# Patient Record
Sex: FEMALE | Race: WHITE | NOT HISPANIC OR LATINO | Employment: UNEMPLOYED | URBAN - NONMETROPOLITAN AREA
[De-identification: names, ages, dates, MRNs, and addresses within clinical notes are randomized per-mention and may not be internally consistent; named-entity substitution may affect disease eponyms.]

---

## 2023-03-22 DIAGNOSIS — I48.0 PAROXYSMAL ATRIAL FIBRILLATION (MULTI): Primary | ICD-10-CM

## 2023-03-22 RX ORDER — SOTALOL HYDROCHLORIDE 80 MG/1
80 TABLET ORAL EVERY 12 HOURS
COMMUNITY
End: 2023-03-22 | Stop reason: SDUPTHER

## 2023-03-26 PROBLEM — I12.9 STAGE 3 CHRONIC KIDNEY DISEASE DUE TO BENIGN HYPERTENSION (MULTI): Status: ACTIVE | Noted: 2023-03-26

## 2023-03-26 PROBLEM — I49.5 SICK SINUS SYNDROME DUE TO SA NODE DYSFUNCTION (MULTI): Status: ACTIVE | Noted: 2023-03-26

## 2023-03-26 PROBLEM — I25.10 CAD (CORONARY ATHEROSCLEROTIC DISEASE): Status: ACTIVE | Noted: 2023-03-26

## 2023-03-26 PROBLEM — N18.30 STAGE 3 CHRONIC KIDNEY DISEASE DUE TO BENIGN HYPERTENSION (MULTI): Status: ACTIVE | Noted: 2023-03-26

## 2023-03-26 PROBLEM — I65.21 CAROTID STENOSIS, RIGHT: Status: ACTIVE | Noted: 2023-03-26

## 2023-03-26 PROBLEM — I10 BENIGN ESSENTIAL HYPERTENSION: Status: ACTIVE | Noted: 2023-03-26

## 2023-03-26 PROBLEM — I07.1 MODERATE TRICUSPID REGURGITATION: Status: ACTIVE | Noted: 2023-03-26

## 2023-03-26 PROBLEM — F33.1 MAJOR DEPRESSIVE DISORDER, RECURRENT EPISODE, MODERATE WITH ANXIOUS DISTRESS (MULTI): Status: ACTIVE | Noted: 2023-03-26

## 2023-03-26 PROBLEM — E78.00 HYPERCHOLESTEROLEMIA: Status: ACTIVE | Noted: 2023-03-26

## 2023-03-26 PROBLEM — I27.20 PULMONARY HYPERTENSION, UNSPECIFIED (MULTI): Status: ACTIVE | Noted: 2023-03-26

## 2023-03-26 PROBLEM — I34.0 MODERATE MITRAL REGURGITATION: Status: ACTIVE | Noted: 2023-03-26

## 2023-03-26 PROBLEM — I48.91 ATRIAL FIBRILLATION (MULTI): Status: ACTIVE | Noted: 2023-03-26

## 2023-03-26 RX ORDER — SOTALOL HYDROCHLORIDE 80 MG/1
80 TABLET ORAL EVERY 12 HOURS
Qty: 120 TABLET | Refills: 0 | Status: SHIPPED | OUTPATIENT
Start: 2023-03-26

## 2024-03-04 ENCOUNTER — TELEPHONE (OUTPATIENT)
Dept: PRIMARY CARE | Facility: CLINIC | Age: 84
End: 2024-03-04
Payer: COMMERCIAL

## 2024-03-04 NOTE — TELEPHONE ENCOUNTER
Pt daughter called and said that they think that she has fluid build up she is unusually tired and she took an extra water pill and lost some weight. Daughter is concerned and wondered if you would see her sometime this week?     Theresa#[phone]

## 2024-03-04 NOTE — TELEPHONE ENCOUNTER
Spoke with Theresa she saw her cardiologist a month ago but said she would call in tomorrow to try to get a same day sick

## 2024-03-05 ENCOUNTER — OFFICE VISIT (OUTPATIENT)
Dept: PRIMARY CARE | Facility: CLINIC | Age: 84
End: 2024-03-05
Payer: COMMERCIAL

## 2024-03-05 VITALS
WEIGHT: 187.6 LBS | OXYGEN SATURATION: 98 % | HEART RATE: 71 BPM | SYSTOLIC BLOOD PRESSURE: 120 MMHG | DIASTOLIC BLOOD PRESSURE: 80 MMHG | BODY MASS INDEX: 36.64 KG/M2

## 2024-03-05 DIAGNOSIS — I50.9 CONGESTIVE HEART FAILURE, UNSPECIFIED HF CHRONICITY, UNSPECIFIED HEART FAILURE TYPE (MULTI): ICD-10-CM

## 2024-03-05 DIAGNOSIS — I25.118 ATHEROSCLEROSIS OF NATIVE CORONARY ARTERY OF NATIVE HEART WITH STABLE ANGINA PECTORIS (CMS-HCC): ICD-10-CM

## 2024-03-05 DIAGNOSIS — I48.91 ATRIAL FIBRILLATION, UNSPECIFIED TYPE (MULTI): Primary | ICD-10-CM

## 2024-03-05 DIAGNOSIS — I27.20 PULMONARY HYPERTENSION, UNSPECIFIED (MULTI): ICD-10-CM

## 2024-03-05 PROBLEM — R53.83 OTHER FATIGUE: Status: ACTIVE | Noted: 2024-03-05

## 2024-03-05 PROBLEM — H25.9 AGE-RELATED CATARACT OF BOTH EYES: Status: ACTIVE | Noted: 2024-03-05

## 2024-03-05 PROBLEM — H69.93 DYSFUNCTION OF BOTH EUSTACHIAN TUBES: Status: ACTIVE | Noted: 2024-03-05

## 2024-03-05 PROBLEM — I87.2 STASIS DERMATITIS, ACUTE: Status: ACTIVE | Noted: 2024-03-05

## 2024-03-05 PROBLEM — K52.9 CHRONIC DIARRHEA: Status: ACTIVE | Noted: 2024-03-05

## 2024-03-05 PROBLEM — R21 RASH: Status: ACTIVE | Noted: 2024-03-05

## 2024-03-05 PROBLEM — D64.9 ANEMIA: Status: ACTIVE | Noted: 2024-03-05

## 2024-03-05 PROBLEM — R06.09 DYSPNEA ON EXERTION: Status: ACTIVE | Noted: 2024-03-05

## 2024-03-05 PROBLEM — R60.0 BILATERAL EDEMA OF LOWER EXTREMITY: Status: ACTIVE | Noted: 2024-03-05

## 2024-03-05 LAB
ALBUMIN SERPL BCP-MCNC: 4.1 G/DL (ref 3.4–5)
ALP SERPL-CCNC: 69 U/L (ref 33–136)
ALT SERPL W P-5'-P-CCNC: 9 U/L (ref 7–45)
ANION GAP SERPL CALC-SCNC: 14 MMOL/L (ref 10–20)
AST SERPL W P-5'-P-CCNC: 18 U/L (ref 9–39)
BASOPHILS # BLD AUTO: 0.02 X10*3/UL (ref 0–0.1)
BASOPHILS NFR BLD AUTO: 0.4 %
BILIRUB SERPL-MCNC: 0.6 MG/DL (ref 0–1.2)
BUN SERPL-MCNC: 43 MG/DL (ref 6–23)
CALCIUM SERPL-MCNC: 8.6 MG/DL (ref 8.6–10.3)
CHLORIDE SERPL-SCNC: 102 MMOL/L (ref 98–107)
CO2 SERPL-SCNC: 27 MMOL/L (ref 21–32)
CREAT SERPL-MCNC: 1.39 MG/DL (ref 0.5–1.05)
EGFRCR SERPLBLD CKD-EPI 2021: 38 ML/MIN/1.73M*2
EOSINOPHIL # BLD AUTO: 0.19 X10*3/UL (ref 0–0.4)
EOSINOPHIL NFR BLD AUTO: 3.5 %
ERYTHROCYTE [DISTWIDTH] IN BLOOD BY AUTOMATED COUNT: 14.6 % (ref 11.5–14.5)
EST. AVERAGE GLUCOSE BLD GHB EST-MCNC: 126 MG/DL
GLUCOSE SERPL-MCNC: 96 MG/DL (ref 74–99)
HBA1C MFR BLD: 6 %
HCT VFR BLD AUTO: 33.5 % (ref 36–46)
HGB BLD-MCNC: 10.4 G/DL (ref 12–16)
IMM GRANULOCYTES # BLD AUTO: 0.02 X10*3/UL (ref 0–0.5)
IMM GRANULOCYTES NFR BLD AUTO: 0.4 % (ref 0–0.9)
LYMPHOCYTES # BLD AUTO: 0.85 X10*3/UL (ref 0.8–3)
LYMPHOCYTES NFR BLD AUTO: 15.6 %
MCH RBC QN AUTO: 28.5 PG (ref 26–34)
MCHC RBC AUTO-ENTMCNC: 31 G/DL (ref 32–36)
MCV RBC AUTO: 92 FL (ref 80–100)
MONOCYTES # BLD AUTO: 0.56 X10*3/UL (ref 0.05–0.8)
MONOCYTES NFR BLD AUTO: 10.3 %
NEUTROPHILS # BLD AUTO: 3.8 X10*3/UL (ref 1.6–5.5)
NEUTROPHILS NFR BLD AUTO: 69.8 %
NRBC BLD-RTO: 0 /100 WBCS (ref 0–0)
PLATELET # BLD AUTO: 175 X10*3/UL (ref 150–450)
POTASSIUM SERPL-SCNC: 4.2 MMOL/L (ref 3.5–5.3)
PROT SERPL-MCNC: 7.3 G/DL (ref 6.4–8.2)
RBC # BLD AUTO: 3.65 X10*6/UL (ref 4–5.2)
SODIUM SERPL-SCNC: 139 MMOL/L (ref 136–145)
TSH SERPL-ACNC: 2.31 MIU/L (ref 0.44–3.98)
WBC # BLD AUTO: 5.4 X10*3/UL (ref 4.4–11.3)

## 2024-03-05 PROCEDURE — 1160F RVW MEDS BY RX/DR IN RCRD: CPT | Performed by: FAMILY MEDICINE

## 2024-03-05 PROCEDURE — 99213 OFFICE O/P EST LOW 20 MIN: CPT | Performed by: FAMILY MEDICINE

## 2024-03-05 PROCEDURE — 3079F DIAST BP 80-89 MM HG: CPT | Performed by: FAMILY MEDICINE

## 2024-03-05 PROCEDURE — 1036F TOBACCO NON-USER: CPT | Performed by: FAMILY MEDICINE

## 2024-03-05 PROCEDURE — 36415 COLL VENOUS BLD VENIPUNCTURE: CPT

## 2024-03-05 PROCEDURE — 3074F SYST BP LT 130 MM HG: CPT | Performed by: FAMILY MEDICINE

## 2024-03-05 PROCEDURE — 80053 COMPREHEN METABOLIC PANEL: CPT

## 2024-03-05 PROCEDURE — 1159F MED LIST DOCD IN RCRD: CPT | Performed by: FAMILY MEDICINE

## 2024-03-05 PROCEDURE — 83036 HEMOGLOBIN GLYCOSYLATED A1C: CPT

## 2024-03-05 PROCEDURE — 85025 COMPLETE CBC W/AUTO DIFF WBC: CPT

## 2024-03-05 PROCEDURE — 84443 ASSAY THYROID STIM HORMONE: CPT

## 2024-03-05 RX ORDER — MAGNESIUM 200 MG
1 TABLET ORAL DAILY
Status: ON HOLD | COMMUNITY
Start: 2021-03-29 | End: 2024-03-27 | Stop reason: ALTCHOICE

## 2024-03-05 RX ORDER — ACETAMINOPHEN 500 MG
2000 TABLET ORAL DAILY
COMMUNITY
Start: 2021-03-29

## 2024-03-05 RX ORDER — POTASSIUM CHLORIDE 20 MEQ/1
20 TABLET, EXTENDED RELEASE ORAL DAILY
COMMUNITY

## 2024-03-05 RX ORDER — CLOPIDOGREL BISULFATE 75 MG/1
75 TABLET ORAL DAILY
COMMUNITY

## 2024-03-05 RX ORDER — NITROGLYCERIN 0.4 MG/1
TABLET SUBLINGUAL
COMMUNITY

## 2024-03-05 RX ORDER — FUROSEMIDE 40 MG/1
80 TABLET ORAL DAILY
COMMUNITY

## 2024-03-05 RX ORDER — LOSARTAN POTASSIUM 100 MG/1
100 TABLET ORAL DAILY
COMMUNITY

## 2024-03-05 ASSESSMENT — COLUMBIA-SUICIDE SEVERITY RATING SCALE - C-SSRS
2. HAVE YOU ACTUALLY HAD ANY THOUGHTS OF KILLING YOURSELF?: NO
6. HAVE YOU EVER DONE ANYTHING, STARTED TO DO ANYTHING, OR PREPARED TO DO ANYTHING TO END YOUR LIFE?: NO
1. IN THE PAST MONTH, HAVE YOU WISHED YOU WERE DEAD OR WISHED YOU COULD GO TO SLEEP AND NOT WAKE UP?: NO

## 2024-03-05 ASSESSMENT — PATIENT HEALTH QUESTIONNAIRE - PHQ9
2. FEELING DOWN, DEPRESSED OR HOPELESS: NOT AT ALL
SUM OF ALL RESPONSES TO PHQ9 QUESTIONS 1 AND 2: 0
1. LITTLE INTEREST OR PLEASURE IN DOING THINGS: NOT AT ALL

## 2024-03-05 ASSESSMENT — ENCOUNTER SYMPTOMS
DEPRESSION: 0
OCCASIONAL FEELINGS OF UNSTEADINESS: 0
LOSS OF SENSATION IN FEET: 0

## 2024-03-05 NOTE — PATIENT INSTRUCTIONS
Simply saline spray often     Flonase 2 puffs each nostril daily       Make sure you are sleeping with your head and neck in alignment with your body      Please think about getting the sleep study - as you likely have sleep apnea and this is contributing to your issues       I will call you or mail your test results - if you do not hear within a week - let me know

## 2024-03-05 NOTE — PROGRESS NOTES
Subjective   Patient ID: Nadine Qureshi is a 84 y.o. female who presents for Sinusitis (Leg swelling, tired, cough , headache).    HPI     Acute appt today  - call was family worried about her filling with fluid   Review of chart - goes to [facility] -   Last note from 2/2024 - was to get Left/Right heart cath       Today  -   Here with family   She complains of being very tired and fatigued going on a while   Lots of swelling   She knows she is in Afib all the time   Sees cardiology     Last Echo 12/2023 -   EF 50 - 55%   Bilateral atrial enlargement   Mod MR and TR   Severe Pulm HTN    She did not want to get the heart cath     Never tested for ARLENE   Afraid of the mask    Sinuses have been acting up a while -   Had abx twice  - no help          Review of Systems    Objective   /80   Pulse 71   Wt 85.1 kg (187 lb 9.6 oz)   SpO2 98%   BMI 36.64 kg/m²     Physical Exam  Vitals reviewed.   Constitutional:       General: She is not in acute distress.     Appearance: Normal appearance. She is obese. She is not ill-appearing, toxic-appearing or diaphoretic.   HENT:      Head: Normocephalic and atraumatic.      Nose: Congestion present.      Mouth/Throat:      Mouth: Mucous membranes are moist.      Pharynx: No posterior oropharyngeal erythema.   Eyes:      Extraocular Movements: Extraocular movements intact.      Conjunctiva/sclera: Conjunctivae normal.      Pupils: Pupils are equal, round, and reactive to light.   Cardiovascular:      Rate and Rhythm: Normal rate. Rhythm irregular.      Heart sounds: Normal heart sounds. No murmur heard.  Pulmonary:      Effort: Pulmonary effort is normal. No respiratory distress.      Breath sounds: Normal breath sounds. No wheezing.   Musculoskeletal:         General: Swelling (1 plus) present.      Cervical back: No rigidity.   Lymphadenopathy:      Cervical: No cervical adenopathy.   Skin:     General: Skin is warm and dry.      Findings: No rash.   Neurological:       Mental Status: She is alert. Mental status is at baseline.   Psychiatric:         Mood and Affect: Mood normal.         Assessment/Plan   Problem List Items Addressed This Visit             ICD-10-CM       High    Atrial fibrillation (CMS/HCC) - Primary I48.91    Relevant Medications    clopidogrel (Plavix) 75 mg tablet    nitroglycerin (Nitrostat) 0.4 mg SL tablet    Other Relevant Orders    CBC and Auto Differential    Comprehensive Metabolic Panel    TSH with reflex to Free T4 if abnormal    Hemoglobin A1C    CAD (coronary atherosclerotic disease) I25.10    Relevant Medications    clopidogrel (Plavix) 75 mg tablet    nitroglycerin (Nitrostat) 0.4 mg SL tablet    Pulmonary hypertension, unspecified (CMS/HCC) I27.20    Congestive heart failure (CMS/HCC) I50.9     Fluid over load issues          Relevant Medications    clopidogrel (Plavix) 75 mg tablet    nitroglycerin (Nitrostat) 0.4 mg SL tablet     Discussed today - will get labs     Urged the cath     And strong talk about likely ARLENE leading to a lot of this -   Family states she does stop breathing -   Panics in the night  - wakes up and has to sit up to breath     Strongly urged sleep study and to consider CPAP    Discussed saline and flonase for sinuses       We discussed at visit any disease processes that were of concern as well as the risks, benefits and instructions of any new medication provided.    See orders and discussion section for information handed to patient on their Clinical Summary.   Patient (and/or caretaker of patient if present)  stated all questions were answered, and they voiced understanding of instructions.

## 2024-03-06 PROBLEM — R73.03 PREDIABETES: Status: ACTIVE | Noted: 2024-03-06

## 2024-03-27 ENCOUNTER — HOSPITAL ENCOUNTER (OUTPATIENT)
Facility: HOSPITAL | Age: 84
Setting detail: OUTPATIENT SURGERY
Discharge: HOME | End: 2024-03-27
Attending: INTERNAL MEDICINE | Admitting: INTERNAL MEDICINE
Payer: COMMERCIAL

## 2024-03-27 VITALS
HEART RATE: 65 BPM | TEMPERATURE: 97.7 F | SYSTOLIC BLOOD PRESSURE: 133 MMHG | DIASTOLIC BLOOD PRESSURE: 90 MMHG | WEIGHT: 190.48 LBS | OXYGEN SATURATION: 96 % | RESPIRATION RATE: 16 BRPM | HEIGHT: 60 IN | BODY MASS INDEX: 37.4 KG/M2

## 2024-03-27 DIAGNOSIS — I50.32 CHRONIC DIASTOLIC (CONGESTIVE) HEART FAILURE (MULTI): ICD-10-CM

## 2024-03-27 DIAGNOSIS — I27.20 PULMONARY HYPERTENSION (MULTI): ICD-10-CM

## 2024-03-27 DIAGNOSIS — I25.119 ATHEROSCLEROSIS OF NATIVE CORONARY ARTERY OF NATIVE HEART WITH ANGINA PECTORIS (CMS-HCC): ICD-10-CM

## 2024-03-27 PROCEDURE — 2500000005 HC RX 250 GENERAL PHARMACY W/O HCPCS: Performed by: INTERNAL MEDICINE

## 2024-03-27 PROCEDURE — C1769 GUIDE WIRE: HCPCS | Performed by: INTERNAL MEDICINE

## 2024-03-27 PROCEDURE — 99153 MOD SED SAME PHYS/QHP EA: CPT | Performed by: INTERNAL MEDICINE

## 2024-03-27 PROCEDURE — 99152 MOD SED SAME PHYS/QHP 5/>YRS: CPT | Performed by: INTERNAL MEDICINE

## 2024-03-27 PROCEDURE — 93456 R HRT CORONARY ARTERY ANGIO: CPT | Performed by: INTERNAL MEDICINE

## 2024-03-27 PROCEDURE — 2500000004 HC RX 250 GENERAL PHARMACY W/ HCPCS (ALT 636 FOR OP/ED): Performed by: INTERNAL MEDICINE

## 2024-03-27 PROCEDURE — C1760 CLOSURE DEV, VASC: HCPCS | Performed by: INTERNAL MEDICINE

## 2024-03-27 PROCEDURE — 2550000001 HC RX 255 CONTRASTS: Performed by: INTERNAL MEDICINE

## 2024-03-27 PROCEDURE — C1894 INTRO/SHEATH, NON-LASER: HCPCS | Performed by: INTERNAL MEDICINE

## 2024-03-27 PROCEDURE — 2720000007 HC OR 272 NO HCPCS: Performed by: INTERNAL MEDICINE

## 2024-03-27 PROCEDURE — C1887 CATHETER, GUIDING: HCPCS | Performed by: INTERNAL MEDICINE

## 2024-03-27 PROCEDURE — 2780000003 HC OR 278 NO HCPCS: Performed by: INTERNAL MEDICINE

## 2024-03-27 PROCEDURE — G0269 OCCLUSIVE DEVICE IN VEIN ART: HCPCS | Mod: TC | Performed by: INTERNAL MEDICINE

## 2024-03-27 RX ORDER — LIDOCAINE HYDROCHLORIDE 20 MG/ML
INJECTION, SOLUTION INFILTRATION; PERINEURAL AS NEEDED
Status: DISCONTINUED | OUTPATIENT
Start: 2024-03-27 | End: 2024-03-27 | Stop reason: HOSPADM

## 2024-03-27 RX ORDER — FENTANYL CITRATE 50 UG/ML
INJECTION, SOLUTION INTRAMUSCULAR; INTRAVENOUS AS NEEDED
Status: DISCONTINUED | OUTPATIENT
Start: 2024-03-27 | End: 2024-03-27 | Stop reason: HOSPADM

## 2024-03-27 RX ORDER — ONDANSETRON HYDROCHLORIDE 2 MG/ML
INJECTION, SOLUTION INTRAVENOUS AS NEEDED
Status: DISCONTINUED | OUTPATIENT
Start: 2024-03-27 | End: 2024-03-27 | Stop reason: HOSPADM

## 2024-03-27 RX ORDER — CEFDINIR 300 MG/1
300 CAPSULE ORAL 2 TIMES DAILY
COMMUNITY

## 2024-03-27 RX ADMIN — SODIUM CHLORIDE 250 ML: 9 INJECTION, SOLUTION INTRAVENOUS at 08:45

## 2024-03-27 ASSESSMENT — PAIN SCALES - GENERAL
PAINLEVEL_OUTOF10: 0 - NO PAIN

## 2024-03-27 ASSESSMENT — COLUMBIA-SUICIDE SEVERITY RATING SCALE - C-SSRS
2. HAVE YOU ACTUALLY HAD ANY THOUGHTS OF KILLING YOURSELF?: NO
1. IN THE PAST MONTH, HAVE YOU WISHED YOU WERE DEAD OR WISHED YOU COULD GO TO SLEEP AND NOT WAKE UP?: NO
6. HAVE YOU EVER DONE ANYTHING, STARTED TO DO ANYTHING, OR PREPARED TO DO ANYTHING TO END YOUR LIFE?: NO

## 2024-03-27 ASSESSMENT — PAIN - FUNCTIONAL ASSESSMENT
PAIN_FUNCTIONAL_ASSESSMENT: 0-10

## 2024-03-27 NOTE — DISCHARGE INSTRUCTIONS
Sedation  Do not drive a vehicle or use machinery that can get you hurt for 24hrs.  Do not dink any alcoholic drinks or take any non-prescriptive medications that contain alcohol for 24hrs.  Do not make any important decisions for 24hrs.    Activity  You are advised to go directly home from the hospital.  DO NOT lift anything heavier than 10 pounds for one week, this allows for proper healing of  the groin.  Keep stair climbing to a minimum for the firs day you are home.  No sexual activity for 24hrs after you arrive home.    Diet  You may resume your normal diet.    Groin Care  If you start bleeding from your groin, lay down and have someone apply firm pressure just above the puncture site. If bleeding does not stop after five minutes of firm pressure or if you cannot control the bleeding, call 911. Also, always notify your doctor if bleeding occurs.    Gently cleanse the puncture site in your groin with soap and water only.   DO NOT soak in the bathtub or go swimming for one week to help prevent infection.   It is normal to have bruising or soreness at the groin site.   Watch for signs and symptoms of infection: Redness, swelling, pus or foul smelling drainage, increased tenderness or fever.   Remove the dressing in the morning and cover with a band-aid, change the band-aid every day and keep the puncture site covered for three days.   You may shower the next day after your procedure.    Specific Concerns to watch for:  If after you are discharged you develop difficulty breathing, rash, hives, severe nausea, vomiting, lightheadedness or any signs of infection, please contact your doctor and go to the nearest emergency room.    Safety  It is recommended that a responsible adult be with you for the firs 24 hours. This is for your protection and safety since you may not be as alert as usual with the drugs from your procedure still in your system.    Follow-up   Call your doctor within 1 week to schedule a follow-up  appointment.   If you have any questions about the effects of the sedative drugs or groin care, call the physician responsible for your procedure.

## 2024-03-27 NOTE — H&P
History Of Present Illness  Nadine Qureshi is a 84 y.o. female presenting with dyspnea fatigue.     Past Medical History  Past Medical History:   Diagnosis Date    Cellulitis of unspecified part of limb 06/15/2021    Cellulitis, leg    Chronic kidney disease, stage 3 unspecified (CMS/HCC) 07/31/2019    CKD (chronic kidney disease), stage III    Essential (primary) hypertension     Benign essential hypertension    Familial hypercholesterolemia 06/07/2013    Essential familial hypercholesterolemia    Other conditions influencing health status 06/07/2013    Coronary Artery Disease    Personal history of other diseases of the respiratory system 05/03/2018    History of acute sinusitis    Personal history of other mental and behavioral disorders 05/03/2018    History of depression       Surgical History  Past Surgical History:   Procedure Laterality Date    BACK SURGERY  06/07/2013    Lower Back Surgery    CARDIAC PACEMAKER PLACEMENT  06/07/2013    Pacemaker Placement    CORONARY ANGIOPLASTY WITH STENT PLACEMENT  06/07/2013    Cath Stent Placement        Social History  She reports that she has never smoked. She has never used smokeless tobacco. She reports that she does not drink alcohol and does not use drugs.    Family History  No family history on file.     Allergies  Patient has no known allergies.    Review of Systems   Comprehensive 10-point review of systems negative otherwise as noted above in HPI   Physical Exam   Constitutional: Well developed, awake/alert/oriented x3, no distress, alert and cooperative  Eyes: PERRL, EOMI, clear sclera  ENMT: mucous membranes moist, no apparent injury, no lesions seen  Head/Neck: Neck supple, no apparent injury, thyroid without mass or tenderness, No JVD, trachea midline, no bruits  Respiratory/Thorax: Patent airways, CTAB, normal breath sounds with good chest expansion, thorax symmetric  Cardiovascular: Regular, rate and rhythm, no murmurs, 2+ equal pulses of the  extremities, normal S 1and S 2  Gastrointestinal: Nondistended, soft, non-tender, no rebound tenderness or guarding, no masses palpable, no organomegaly, +BS, no bruits  Musculoskeletal: ROM intact, no joint swelling, normal strength  Extremities: normal extremities, no cyanosis edema, contusions or wounds, no clubbing  Neurological: alert and oriented x3, intact senses, motor, response and reflexes, normal strength  Lymphatic: No significant lymphadenopathy  Psychological: Appropriate mood and behavior  Skin: Warm and dry, no lesions, no rashes   Last Recorded Vitals  There were no vitals taken for this visit.      See office notes for details     Assessment/Plan   Active Problems:  There are no active Hospital Problems.      Pred with y, consent obtained       I spent 10 minutes in the professional and overall care of this patient.      Rikki Briones MD

## 2024-03-27 NOTE — POST-PROCEDURE NOTE
Physician Transition of Care Summary  Invasive Cardiovascular Lab    Procedure Date: 3/27/2024  Attending:    José Antonio Briones - Primary  Resident/Fellow/Other Assistant: Surgeon(s) and Role:    Indications:   Pre-op Diagnosis     * Atherosclerosis of native coronary artery of native heart with angina pectoris (CMS/HCC) [I25.119]     * Pulmonary hypertension (CMS/HCC) [I27.20]    Post-procedure diagnosis:   Post-op Diagnosis     * Atherosclerosis of native coronary artery of native heart with angina pectoris (CMS/HCC) [I25.119]     * Pulmonary hypertension (CMS/HCC) [I27.20]    Procedure(s):   Left & Right Heart Cath w Angiography & LV  41812 - UT R & L HRT CATH WINJX HRT ART& L VENTR IMG        Procedure Findings:   Severe mid LAD disease patent ostial RCA stent severe pulmonary HTN moderately elevated PCWP mPA 45 mm Hg mPCWP 25 mm Hg mRA 21 mm Hg'CO CI 3.56/1.96 No shunts    Description of the Procedure:   RL    Complications:   None    Stents/Implants:   NA    Anticoagulation/Antiplatelet Plan:   NA    Estimated Blood Loss:   * No values recorded between 3/27/2024  9:03 AM and 3/27/2024 10:08 AM *    Anesthesia: Moderate Sedation Anesthesia Staff: No anesthesia staff entered.    Any Specimen(s) Removed:   No specimens collected during this procedure.    Disposition:   home      Electronically signed by: Rikki Briones MD, 3/27/2024 10:08 AM

## 2024-05-08 ENCOUNTER — APPOINTMENT (OUTPATIENT)
Dept: CARDIOLOGY | Facility: CLINIC | Age: 84
End: 2024-05-08
Payer: COMMERCIAL

## 2024-08-02 ENCOUNTER — APPOINTMENT (OUTPATIENT)
Dept: RADIOLOGY | Facility: HOSPITAL | Age: 84
DRG: 291 | End: 2024-08-02
Payer: COMMERCIAL

## 2024-08-02 ENCOUNTER — HOSPITAL ENCOUNTER (INPATIENT)
Facility: HOSPITAL | Age: 84
LOS: 2 days | Discharge: HOME | DRG: 291 | End: 2024-08-04
Attending: STUDENT IN AN ORGANIZED HEALTH CARE EDUCATION/TRAINING PROGRAM | Admitting: FAMILY MEDICINE
Payer: COMMERCIAL

## 2024-08-02 ENCOUNTER — APPOINTMENT (OUTPATIENT)
Dept: CARDIOLOGY | Facility: HOSPITAL | Age: 84
DRG: 291 | End: 2024-08-02
Payer: COMMERCIAL

## 2024-08-02 DIAGNOSIS — I48.11 LONGSTANDING PERSISTENT ATRIAL FIBRILLATION (MULTI): ICD-10-CM

## 2024-08-02 DIAGNOSIS — I50.9 ACUTE ON CHRONIC CONGESTIVE HEART FAILURE, UNSPECIFIED HEART FAILURE TYPE (MULTI): Primary | ICD-10-CM

## 2024-08-02 DIAGNOSIS — J32.9 SINUSITIS, UNSPECIFIED CHRONICITY, UNSPECIFIED LOCATION: ICD-10-CM

## 2024-08-02 DIAGNOSIS — I27.20 PULMONARY HYPERTENSION, UNSPECIFIED (MULTI): ICD-10-CM

## 2024-08-02 DIAGNOSIS — I50.33 ACUTE ON CHRONIC DIASTOLIC CONGESTIVE HEART FAILURE (MULTI): ICD-10-CM

## 2024-08-02 PROBLEM — E78.01 FAMILIAL HYPERCHOLESTEROLEMIA: Status: ACTIVE | Noted: 2023-03-26

## 2024-08-02 PROBLEM — E66.9 OBESITY: Status: ACTIVE | Noted: 2024-08-02

## 2024-08-02 LAB
ALBUMIN SERPL BCP-MCNC: 3.9 G/DL (ref 3.4–5)
ALP SERPL-CCNC: 58 U/L (ref 33–136)
ALT SERPL W P-5'-P-CCNC: 14 U/L (ref 7–45)
ANION GAP BLDV CALCULATED.4IONS-SCNC: 14 MMOL/L (ref 10–25)
ANION GAP SERPL CALC-SCNC: 14 MMOL/L (ref 10–20)
AORTIC VALVE MEAN GRADIENT: 3 MMHG
AORTIC VALVE PEAK VELOCITY: 1.1 M/S
AST SERPL W P-5'-P-CCNC: 36 U/L (ref 9–39)
ATRIAL RATE: 63 BPM
AV PEAK GRADIENT: 4.8 MMHG
AVA (PEAK VEL): 1.92 CM2
AVA (VTI): 2.12 CM2
BASE EXCESS BLDV CALC-SCNC: 0.9 MMOL/L (ref -2–3)
BASOPHILS # BLD AUTO: 0.04 X10*3/UL (ref 0–0.1)
BASOPHILS NFR BLD AUTO: 0.8 %
BILIRUB SERPL-MCNC: 0.6 MG/DL (ref 0–1.2)
BNP SERPL-MCNC: 717 PG/ML (ref 0–99)
BODY TEMPERATURE: ABNORMAL
BUN SERPL-MCNC: 67 MG/DL (ref 6–23)
CA-I BLDV-SCNC: 1.16 MMOL/L (ref 1.1–1.33)
CALCIUM SERPL-MCNC: 8.9 MG/DL (ref 8.6–10.3)
CARDIAC TROPONIN I PNL SERPL HS: 25 NG/L (ref 0–13)
CARDIAC TROPONIN I PNL SERPL HS: 26 NG/L (ref 0–13)
CHLORIDE BLDV-SCNC: 102 MMOL/L (ref 98–107)
CHLORIDE SERPL-SCNC: 100 MMOL/L (ref 98–107)
CO2 SERPL-SCNC: 26 MMOL/L (ref 21–32)
CREAT SERPL-MCNC: 1.99 MG/DL (ref 0.5–1.05)
EGFRCR SERPLBLD CKD-EPI 2021: 25 ML/MIN/1.73M*2
EJECTION FRACTION APICAL 4 CHAMBER: 40.6
EJECTION FRACTION: 58 %
EOSINOPHIL # BLD AUTO: 0.16 X10*3/UL (ref 0–0.4)
EOSINOPHIL NFR BLD AUTO: 3.3 %
ERYTHROCYTE [DISTWIDTH] IN BLOOD BY AUTOMATED COUNT: 16.8 % (ref 11.5–14.5)
GLUCOSE BLDV-MCNC: 109 MG/DL (ref 74–99)
GLUCOSE SERPL-MCNC: 109 MG/DL (ref 74–99)
HCO3 BLDV-SCNC: 26 MMOL/L (ref 22–26)
HCT VFR BLD AUTO: 31 % (ref 36–46)
HCT VFR BLD EST: 29 % (ref 36–46)
HGB BLD-MCNC: 9.7 G/DL (ref 12–16)
HGB BLDV-MCNC: 9.6 G/DL (ref 12–16)
IMM GRANULOCYTES # BLD AUTO: 0.02 X10*3/UL (ref 0–0.5)
IMM GRANULOCYTES NFR BLD AUTO: 0.4 % (ref 0–0.9)
INHALED O2 CONCENTRATION: 21 %
LACTATE BLDV-SCNC: 1.6 MMOL/L (ref 0.4–2)
LACTATE SERPL-SCNC: 1.3 MMOL/L (ref 0.4–2)
LEFT ATRIUM VOLUME AREA LENGTH INDEX BSA: 47.1 ML/M2
LEFT VENTRICLE INTERNAL DIMENSION DIASTOLE: 4.23 CM (ref 3.5–6)
LEFT VENTRICULAR OUTFLOW TRACT DIAMETER: 2 CM
LYMPHOCYTES # BLD AUTO: 0.97 X10*3/UL (ref 0.8–3)
LYMPHOCYTES NFR BLD AUTO: 20.1 %
MCH RBC QN AUTO: 27.1 PG (ref 26–34)
MCHC RBC AUTO-ENTMCNC: 31.3 G/DL (ref 32–36)
MCV RBC AUTO: 87 FL (ref 80–100)
MITRAL VALVE E/A RATIO: 4.05
MONOCYTES # BLD AUTO: 0.56 X10*3/UL (ref 0.05–0.8)
MONOCYTES NFR BLD AUTO: 11.6 %
NEUTROPHILS # BLD AUTO: 3.08 X10*3/UL (ref 1.6–5.5)
NEUTROPHILS NFR BLD AUTO: 63.8 %
NRBC BLD-RTO: 0 /100 WBCS (ref 0–0)
OXYHGB MFR BLDV: 26.4 % (ref 45–75)
PCO2 BLDV: 43 MM HG (ref 41–51)
PH BLDV: 7.39 PH (ref 7.33–7.43)
PLATELET # BLD AUTO: 158 X10*3/UL (ref 150–450)
PO2 BLDV: 19 MM HG (ref 35–45)
POTASSIUM BLDV-SCNC: 4.7 MMOL/L (ref 3.5–5.3)
POTASSIUM SERPL-SCNC: 4 MMOL/L (ref 3.5–5.3)
POTASSIUM SERPL-SCNC: 5.4 MMOL/L (ref 3.5–5.3)
PROT SERPL-MCNC: 7.3 G/DL (ref 6.4–8.2)
Q ONSET: 198 MS
QRS COUNT: 10 BEATS
QRS DURATION: 158 MS
QT INTERVAL: 442 MS
QTC CALCULATION(BAZETT): 455 MS
QTC FREDERICIA: 451 MS
R AXIS: -64 DEGREES
RBC # BLD AUTO: 3.58 X10*6/UL (ref 4–5.2)
RIGHT VENTRICLE FREE WALL PEAK S': 8.27 CM/S
RIGHT VENTRICLE PEAK SYSTOLIC PRESSURE: 58.7 MMHG
SAO2 % BLDV: 27 % (ref 45–75)
SARS-COV-2 RNA RESP QL NAA+PROBE: NOT DETECTED
SODIUM BLDV-SCNC: 137 MMOL/L (ref 136–145)
SODIUM SERPL-SCNC: 135 MMOL/L (ref 136–145)
T AXIS: 102 DEGREES
T OFFSET: 419 MS
TRICUSPID ANNULAR PLANE SYSTOLIC EXCURSION: 1.6 CM
VENTRICULAR RATE: 64 BPM
WBC # BLD AUTO: 4.8 X10*3/UL (ref 4.4–11.3)

## 2024-08-02 PROCEDURE — 84484 ASSAY OF TROPONIN QUANT: CPT | Performed by: STUDENT IN AN ORGANIZED HEALTH CARE EDUCATION/TRAINING PROGRAM

## 2024-08-02 PROCEDURE — 71045 X-RAY EXAM CHEST 1 VIEW: CPT

## 2024-08-02 PROCEDURE — 87635 SARS-COV-2 COVID-19 AMP PRB: CPT | Performed by: STUDENT IN AN ORGANIZED HEALTH CARE EDUCATION/TRAINING PROGRAM

## 2024-08-02 PROCEDURE — 99223 1ST HOSP IP/OBS HIGH 75: CPT | Performed by: FAMILY MEDICINE

## 2024-08-02 PROCEDURE — 2500000004 HC RX 250 GENERAL PHARMACY W/ HCPCS (ALT 636 FOR OP/ED)

## 2024-08-02 PROCEDURE — 84132 ASSAY OF SERUM POTASSIUM: CPT | Performed by: STUDENT IN AN ORGANIZED HEALTH CARE EDUCATION/TRAINING PROGRAM

## 2024-08-02 PROCEDURE — 2500000001 HC RX 250 WO HCPCS SELF ADMINISTERED DRUGS (ALT 637 FOR MEDICARE OP): Performed by: FAMILY MEDICINE

## 2024-08-02 PROCEDURE — 2500000004 HC RX 250 GENERAL PHARMACY W/ HCPCS (ALT 636 FOR OP/ED): Performed by: FAMILY MEDICINE

## 2024-08-02 PROCEDURE — 36415 COLL VENOUS BLD VENIPUNCTURE: CPT | Performed by: STUDENT IN AN ORGANIZED HEALTH CARE EDUCATION/TRAINING PROGRAM

## 2024-08-02 PROCEDURE — 85025 COMPLETE CBC W/AUTO DIFF WBC: CPT | Performed by: STUDENT IN AN ORGANIZED HEALTH CARE EDUCATION/TRAINING PROGRAM

## 2024-08-02 PROCEDURE — 99285 EMERGENCY DEPT VISIT HI MDM: CPT | Mod: 25

## 2024-08-02 PROCEDURE — 83880 ASSAY OF NATRIURETIC PEPTIDE: CPT | Performed by: STUDENT IN AN ORGANIZED HEALTH CARE EDUCATION/TRAINING PROGRAM

## 2024-08-02 PROCEDURE — 71045 X-RAY EXAM CHEST 1 VIEW: CPT | Performed by: STUDENT IN AN ORGANIZED HEALTH CARE EDUCATION/TRAINING PROGRAM

## 2024-08-02 PROCEDURE — 93005 ELECTROCARDIOGRAM TRACING: CPT

## 2024-08-02 PROCEDURE — 96374 THER/PROPH/DIAG INJ IV PUSH: CPT

## 2024-08-02 PROCEDURE — 1200000002 HC GENERAL ROOM WITH TELEMETRY DAILY

## 2024-08-02 PROCEDURE — 2500000004 HC RX 250 GENERAL PHARMACY W/ HCPCS (ALT 636 FOR OP/ED): Performed by: STUDENT IN AN ORGANIZED HEALTH CARE EDUCATION/TRAINING PROGRAM

## 2024-08-02 PROCEDURE — 83605 ASSAY OF LACTIC ACID: CPT | Performed by: STUDENT IN AN ORGANIZED HEALTH CARE EDUCATION/TRAINING PROGRAM

## 2024-08-02 PROCEDURE — 84132 ASSAY OF SERUM POTASSIUM: CPT | Performed by: FAMILY MEDICINE

## 2024-08-02 PROCEDURE — 2500000002 HC RX 250 W HCPCS SELF ADMINISTERED DRUGS (ALT 637 FOR MEDICARE OP, ALT 636 FOR OP/ED): Performed by: FAMILY MEDICINE

## 2024-08-02 PROCEDURE — 93306 TTE W/DOPPLER COMPLETE: CPT

## 2024-08-02 RX ORDER — CARVEDILOL 12.5 MG/1
12.5 TABLET ORAL DAILY
Status: DISCONTINUED | OUTPATIENT
Start: 2024-08-02 | End: 2024-08-04 | Stop reason: HOSPADM

## 2024-08-02 RX ORDER — LOSARTAN POTASSIUM 50 MG/1
100 TABLET ORAL DAILY
Status: DISCONTINUED | OUTPATIENT
Start: 2024-08-02 | End: 2024-08-04 | Stop reason: HOSPADM

## 2024-08-02 RX ORDER — FUROSEMIDE 10 MG/ML
80 INJECTION INTRAMUSCULAR; INTRAVENOUS ONCE
Status: COMPLETED | OUTPATIENT
Start: 2024-08-02 | End: 2024-08-02

## 2024-08-02 RX ORDER — ENOXAPARIN SODIUM 100 MG/ML
30 INJECTION SUBCUTANEOUS EVERY 24 HOURS
Status: DISCONTINUED | OUTPATIENT
Start: 2024-08-02 | End: 2024-08-04 | Stop reason: HOSPADM

## 2024-08-02 RX ORDER — POLYETHYLENE GLYCOL 3350 17 G/17G
17 POWDER, FOR SOLUTION ORAL DAILY
Status: DISCONTINUED | OUTPATIENT
Start: 2024-08-02 | End: 2024-08-04 | Stop reason: HOSPADM

## 2024-08-02 RX ORDER — CARVEDILOL 12.5 MG/1
12.5 TABLET ORAL DAILY
COMMUNITY

## 2024-08-02 RX ORDER — CHOLECALCIFEROL (VITAMIN D3) 25 MCG
2000 TABLET ORAL DAILY
Status: DISCONTINUED | OUTPATIENT
Start: 2024-08-02 | End: 2024-08-04 | Stop reason: HOSPADM

## 2024-08-02 RX ORDER — FUROSEMIDE 10 MG/ML
80 INJECTION INTRAMUSCULAR; INTRAVENOUS EVERY 12 HOURS
Status: DISCONTINUED | OUTPATIENT
Start: 2024-08-02 | End: 2024-08-04 | Stop reason: HOSPADM

## 2024-08-02 RX ORDER — CLOPIDOGREL BISULFATE 75 MG/1
75 TABLET ORAL DAILY
Status: DISCONTINUED | OUTPATIENT
Start: 2024-08-03 | End: 2024-08-04 | Stop reason: HOSPADM

## 2024-08-02 RX ORDER — FLUTICASONE PROPIONATE 50 MCG
2 SPRAY, SUSPENSION (ML) NASAL DAILY
Status: DISCONTINUED | OUTPATIENT
Start: 2024-08-02 | End: 2024-08-04 | Stop reason: HOSPADM

## 2024-08-02 RX ADMIN — FUROSEMIDE 80 MG: 10 INJECTION, SOLUTION INTRAMUSCULAR; INTRAVENOUS at 11:10

## 2024-08-02 RX ADMIN — FLUTICASONE PROPIONATE 2 SPRAY: 50 SPRAY, METERED NASAL at 16:19

## 2024-08-02 RX ADMIN — FUROSEMIDE 80 MG: 10 INJECTION, SOLUTION INTRAMUSCULAR; INTRAVENOUS at 20:07

## 2024-08-02 RX ADMIN — ENOXAPARIN SODIUM 30 MG: 30 INJECTION SUBCUTANEOUS at 16:18

## 2024-08-02 RX ADMIN — Medication 2000 UNITS: at 16:19

## 2024-08-02 RX ADMIN — CARVEDILOL 12.5 MG: 12.5 TABLET, FILM COATED ORAL at 16:19

## 2024-08-02 RX ADMIN — PERFLUTREN 2 ML OF DILUTION: 6.52 INJECTION, SUSPENSION INTRAVENOUS at 15:45

## 2024-08-02 SDOH — SOCIAL STABILITY: SOCIAL INSECURITY: DO YOU FEEL UNSAFE GOING BACK TO THE PLACE WHERE YOU ARE LIVING?: NO

## 2024-08-02 SDOH — HEALTH STABILITY: MENTAL HEALTH: HOW OFTEN DO YOU HAVE 6 OR MORE DRINKS ON ONE OCCASION?: NEVER

## 2024-08-02 SDOH — ECONOMIC STABILITY: TRANSPORTATION INSECURITY
IN THE PAST 12 MONTHS, HAS THE LACK OF TRANSPORTATION KEPT YOU FROM MEDICAL APPOINTMENTS OR FROM GETTING MEDICATIONS?: NO

## 2024-08-02 SDOH — ECONOMIC STABILITY: HOUSING INSECURITY: IN THE PAST 12 MONTHS, HOW MANY TIMES HAVE YOU MOVED WHERE YOU WERE LIVING?: 1

## 2024-08-02 SDOH — ECONOMIC STABILITY: INCOME INSECURITY: HOW HARD IS IT FOR YOU TO PAY FOR THE VERY BASICS LIKE FOOD, HOUSING, MEDICAL CARE, AND HEATING?: NOT HARD AT ALL

## 2024-08-02 SDOH — ECONOMIC STABILITY: TRANSPORTATION INSECURITY
IN THE PAST 12 MONTHS, HAS LACK OF TRANSPORTATION KEPT YOU FROM MEETINGS, WORK, OR FROM GETTING THINGS NEEDED FOR DAILY LIVING?: NO

## 2024-08-02 SDOH — ECONOMIC STABILITY: INCOME INSECURITY: IN THE LAST 12 MONTHS, WAS THERE A TIME WHEN YOU WERE NOT ABLE TO PAY THE MORTGAGE OR RENT ON TIME?: NO

## 2024-08-02 SDOH — SOCIAL STABILITY: SOCIAL INSECURITY: ARE THERE ANY APPARENT SIGNS OF INJURIES/BEHAVIORS THAT COULD BE RELATED TO ABUSE/NEGLECT?: NO

## 2024-08-02 SDOH — SOCIAL STABILITY: SOCIAL INSECURITY: ARE YOU OR HAVE YOU BEEN THREATENED OR ABUSED PHYSICALLY, EMOTIONALLY, OR SEXUALLY BY ANYONE?: NO

## 2024-08-02 SDOH — SOCIAL STABILITY: SOCIAL INSECURITY: ABUSE: ADULT

## 2024-08-02 SDOH — SOCIAL STABILITY: SOCIAL INSECURITY: HAVE YOU HAD THOUGHTS OF HARMING ANYONE ELSE?: NO

## 2024-08-02 SDOH — ECONOMIC STABILITY: HOUSING INSECURITY: AT ANY TIME IN THE PAST 12 MONTHS, WERE YOU HOMELESS OR LIVING IN A SHELTER (INCLUDING NOW)?: NO

## 2024-08-02 SDOH — SOCIAL STABILITY: SOCIAL INSECURITY: DOES ANYONE TRY TO KEEP YOU FROM HAVING/CONTACTING OTHER FRIENDS OR DOING THINGS OUTSIDE YOUR HOME?: NO

## 2024-08-02 SDOH — HEALTH STABILITY: MENTAL HEALTH: HOW MANY STANDARD DRINKS CONTAINING ALCOHOL DO YOU HAVE ON A TYPICAL DAY?: PATIENT DOES NOT DRINK

## 2024-08-02 SDOH — HEALTH STABILITY: MENTAL HEALTH: HOW OFTEN DO YOU HAVE A DRINK CONTAINING ALCOHOL?: NEVER

## 2024-08-02 SDOH — HEALTH STABILITY: PHYSICAL HEALTH: ON AVERAGE, HOW MANY DAYS PER WEEK DO YOU ENGAGE IN MODERATE TO STRENUOUS EXERCISE (LIKE A BRISK WALK)?: 0 DAYS

## 2024-08-02 SDOH — SOCIAL STABILITY: SOCIAL INSECURITY: WERE YOU ABLE TO COMPLETE ALL THE BEHAVIORAL HEALTH SCREENINGS?: YES

## 2024-08-02 SDOH — SOCIAL STABILITY: SOCIAL INSECURITY: DO YOU FEEL ANYONE HAS EXPLOITED OR TAKEN ADVANTAGE OF YOU FINANCIALLY OR OF YOUR PERSONAL PROPERTY?: NO

## 2024-08-02 SDOH — SOCIAL STABILITY: SOCIAL INSECURITY: HAS ANYONE EVER THREATENED TO HURT YOUR FAMILY OR YOUR PETS?: NO

## 2024-08-02 SDOH — SOCIAL STABILITY: SOCIAL INSECURITY: HAVE YOU HAD ANY THOUGHTS OF HARMING ANYONE ELSE?: NO

## 2024-08-02 ASSESSMENT — COGNITIVE AND FUNCTIONAL STATUS - GENERAL
STANDING UP FROM CHAIR USING ARMS: A LITTLE
CLIMB 3 TO 5 STEPS WITH RAILING: A LOT
MOBILITY SCORE: 17
HELP NEEDED FOR BATHING: A LITTLE
DRESSING REGULAR LOWER BODY CLOTHING: A LITTLE
EATING MEALS: A LITTLE
DRESSING REGULAR LOWER BODY CLOTHING: A LITTLE
DAILY ACTIVITIY SCORE: 18
TURNING FROM BACK TO SIDE WHILE IN FLAT BAD: A LITTLE
MOVING TO AND FROM BED TO CHAIR: A LITTLE
DAILY ACTIVITIY SCORE: 18
PERSONAL GROOMING: A LITTLE
MOVING TO AND FROM BED TO CHAIR: A LITTLE
WALKING IN HOSPITAL ROOM: A LOT
EATING MEALS: A LITTLE
WALKING IN HOSPITAL ROOM: A LOT
CLIMB 3 TO 5 STEPS WITH RAILING: A LOT
DRESSING REGULAR UPPER BODY CLOTHING: A LITTLE
PATIENT BASELINE BEDBOUND: NO
TOILETING: A LITTLE
PERSONAL GROOMING: A LITTLE
MOBILITY SCORE: 17
TURNING FROM BACK TO SIDE WHILE IN FLAT BAD: A LITTLE
STANDING UP FROM CHAIR USING ARMS: A LITTLE
TOILETING: A LITTLE
HELP NEEDED FOR BATHING: A LITTLE
DRESSING REGULAR UPPER BODY CLOTHING: A LITTLE

## 2024-08-02 ASSESSMENT — ACTIVITIES OF DAILY LIVING (ADL)
FEEDING YOURSELF: INDEPENDENT
HEARING - RIGHT EAR: HEARING AID
ASSISTIVE_DEVICE: WALKER
HEARING - LEFT EAR: HEARING AID
GROOMING: NEEDS ASSISTANCE
WALKS IN HOME: NEEDS ASSISTANCE
PATIENT'S MEMORY ADEQUATE TO SAFELY COMPLETE DAILY ACTIVITIES?: YES
JUDGMENT_ADEQUATE_SAFELY_COMPLETE_DAILY_ACTIVITIES: YES
ADEQUATE_TO_COMPLETE_ADL: YES
BATHING: NEEDS ASSISTANCE
TOILETING: NEEDS ASSISTANCE
DRESSING YOURSELF: NEEDS ASSISTANCE

## 2024-08-02 ASSESSMENT — ENCOUNTER SYMPTOMS
ARTHRALGIAS: 0
NECK STIFFNESS: 0
MUSCULOSKELETAL NEGATIVE: 1
CONSTITUTIONAL NEGATIVE: 1
JOINT SWELLING: 0
CHEST TIGHTNESS: 1
SHORTNESS OF BREATH: 1
LIGHT-HEADEDNESS: 0
FEVER: 0
STRIDOR: 0
BLOOD IN STOOL: 0
WHEEZING: 0
EYES NEGATIVE: 1
APPETITE CHANGE: 0
FREQUENCY: 0
NECK PAIN: 0
CHILLS: 0
ABDOMINAL PAIN: 0
POLYPHAGIA: 0
COLOR CHANGE: 0
MYALGIAS: 0
DIARRHEA: 0
FATIGUE: 0
POLYDIPSIA: 0
COUGH: 0
APNEA: 0
PALPITATIONS: 0
NAUSEA: 0
ACTIVITY CHANGE: 0
VOMITING: 0
ABDOMINAL DISTENTION: 0
DIZZINESS: 0
BACK PAIN: 0

## 2024-08-02 ASSESSMENT — PATIENT HEALTH QUESTIONNAIRE - PHQ9
1. LITTLE INTEREST OR PLEASURE IN DOING THINGS: SEVERAL DAYS
2. FEELING DOWN, DEPRESSED OR HOPELESS: SEVERAL DAYS
SUM OF ALL RESPONSES TO PHQ9 QUESTIONS 1 & 2: 2

## 2024-08-02 ASSESSMENT — LIFESTYLE VARIABLES
HAVE PEOPLE ANNOYED YOU BY CRITICIZING YOUR DRINKING: NO
EVER HAD A DRINK FIRST THING IN THE MORNING TO STEADY YOUR NERVES TO GET RID OF A HANGOVER: NO
EVER FELT BAD OR GUILTY ABOUT YOUR DRINKING: NO
HAVE YOU EVER FELT YOU SHOULD CUT DOWN ON YOUR DRINKING: NO
AUDIT-C TOTAL SCORE: 0
TOTAL SCORE: 0
SKIP TO QUESTIONS 9-10: 1

## 2024-08-02 ASSESSMENT — PAIN SCALES - GENERAL
PAINLEVEL_OUTOF10: 0 - NO PAIN
PAINLEVEL_OUTOF10: 0 - NO PAIN

## 2024-08-02 ASSESSMENT — PAIN - FUNCTIONAL ASSESSMENT
PAIN_FUNCTIONAL_ASSESSMENT: 0-10
PAIN_FUNCTIONAL_ASSESSMENT: 0-10

## 2024-08-02 NOTE — PROGRESS NOTES
08/02/24 1447   Discharge Planning   Living Arrangements Children;Family members   Support Systems Children;Family members   Assistance Needed A&Ox3, independent with ADLs, no DME   Type of Residence Private residence   Home or Post Acute Services None   Expected Discharge Disposition Home   Does the patient need discharge transport arranged? No     08/02/2024 1449: Patient prefers to return home when medically ready and denies any discharge needs.

## 2024-08-02 NOTE — ED NOTES
"Pt arrives with c/o SOB and leg swelling ongoing since Monday. Pt with hx of MI, Afib and Pacemaker. Pt reports \"It's been worse than normal since Monday\". Pt currently in no obvious distress. Pt mildly tachypneic upon arrival to the department.      Darvin Maciel RN  08/02/24 1115    "

## 2024-08-02 NOTE — PROGRESS NOTES
Pharmacy Medication History Review    Nadine Qureshi is a 84 y.o. female admitted for No Principal Problem: There is no principal problem currently on the Problem List. Please update the Problem List and refresh.. Pharmacy reviewed the patient's [id number] medications and allergies for accuracy.    The list below reflectives the updated PTA list. Please review each medication in order reconciliation for additional clarification and justification.  Prior to Admission Medications   Prescriptions Last Dose Informant Patient Reported? Taking?   UNABLE TO FIND 8/1/2024 at 12pm Self, Family Member Yes Yes   Sig: Take 2 capsules by mouth once daily. Med Name: mood  otc   UNABLE TO FIND Unknown Self, Family Member Yes Yes   Sig: Take 1 capsule by mouth. Med Name: formula #[id number] (natural supplement, antibiotic)   VALERIAN ROOT ORAL 8/1/2024 Self, Family Member Yes Yes   Sig: Take 1 tablet by mouth once daily. And prn muscle pains   carvedilol (Coreg) 12.5 mg tablet 8/1/2024 at 12pm Self, Family Member Yes Yes   Sig: Take 1 tablet (12.5 mg) by mouth once daily.   cholecalciferol (Vitamin D-3) 50 mcg (2,000 unit) capsule Unknown Self, Family Member Yes Yes   Sig: Take 1 capsule (50 mcg) by mouth once daily.   clopidogrel (Plavix) 75 mg tablet 8/2/2024 at am Self, Family Member Yes Yes   Sig: Take 1 tablet (75 mg) by mouth once daily.   furosemide (Lasix) 40 mg tablet 8/2/2024 at am Self, Family Member Yes Yes   Sig: Take 1.5 tablets (60 mg) by mouth 2 times daily (morning and late afternoon).   glucosamine sulfate (GLUCOSAMINE ORAL) 8/1/2024 at 12pm Self, Family Member Yes Yes   Sig: Take 1 tablet by mouth once daily.   losartan (Cozaar) 100 mg tablet 8/2/2024 at am Self, Family Member Yes Yes   Sig: Take 1 tablet (100 mg) by mouth once daily.   nitroglycerin (Nitrostat) 0.4 mg SL tablet Unknown Self, Family Member Yes Yes   Sig: Place under the tongue.   potassium chloride CR 20 mEq ER tablet 8/1/2024 at pm Self,  Family Member Yes Yes   Sig: Take 1 tablet (20 mEq) by mouth once daily. With dinner   sodium chloride (Ocean) 0.65 % nasal spray Unknown Self, Family Member Yes Yes   Sig: Administer 1 spray into each nostril if needed for congestion.   sotalol (Betapace) 80 mg tablet Not Taking Self, Family Member No No   Sig: Take 1 tablet (80 mg) by mouth in the morning and 1 tablet (80 mg) in the evening.   Patient not taking: Reported on 8/2/2024   soybean, fermented (NATTOKINASE ORAL) 8/1/2024 at 12pm Self, Family Member Yes Yes   Sig: Take 1 tablet by mouth once daily.      Facility-Administered Medications: None           The list below reflectives the updated allergy list. Please review each documented allergy for additional clarification and justification.  Allergies  Reviewed by Celi Kebede RN on 8/2/2024        Severity Reactions Comments    Lipitor [atorvastatin] Not Specified Unknown     Statins-hmg-coa Reductase Inhibitors Not Specified Unknown     Zocor [simvastatin] Not Specified Unknown             Below are additional concerns with the patient's PTA list.    Tigist Lacey

## 2024-08-02 NOTE — H&P
History Of Present Illness  Nadine Qureshi is a 84 y.o. female with a history of coronary artery disease, pulmonary hypertension, unspecified congestive heart failure and atrial fibrillation status post pacemaker placement who is rate is currently ventricularly paced presented to the emergency room on direction by her cardiologist due to dyspnea.  The patient is present with her daughter who reports on Monday the patient was walking in the rain and decided to hurry.  After doing so became significantly short of breath and has been significantly dyspneic since.  The patient reports increasing shortness of breath at rest however dyspnea on exertion at about 10 feet is stable.  Patient is chronically orthopedic and sleeps in a recliner.  She also reports a 5 pound weight gain and increasing lower extremity edema over the past week.     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    Surgical History  She has a past surgical history that includes Coronary angioplasty with stent (06/07/2013); Back surgery (06/07/2013); Cardiac pacemaker placement (06/07/2013); and Cardiac catheterization (N/A, 3/27/2024).     Social History  She reports that she has never smoked. She has never used smokeless tobacco. She reports that she does not drink alcohol and does not use drugs.    Family History  No family history on file.     Allergies  Lipitor [atorvastatin], Statins-hmg-coa reductase inhibitors, and Zocor [simvastatin]    Review of Systems   Constitutional: Negative.    HENT: Negative.     Eyes: Negative.     Respiratory:  Positive for shortness of breath.    Cardiovascular:  Positive for leg swelling.   Genitourinary: Negative.    Musculoskeletal: Negative.         Physical Exam  Constitutional:       Appearance: She is obese.   HENT:      Head: Normocephalic and atraumatic.      Nose: Nose normal.      Mouth/Throat:      Mouth: Mucous membranes are dry.   Eyes:      Extraocular Movements: Extraocular movements intact.      Pupils: Pupils are equal, round, and reactive to light.   Cardiovascular:      Rate and Rhythm: Normal rate and regular rhythm.      Pulses: Normal pulses.      Heart sounds: Normal heart sounds.   Pulmonary:      Effort: Pulmonary effort is normal.      Breath sounds: Rales present.   Abdominal:      General: Abdomen is flat. Bowel sounds are normal.   Musculoskeletal:      Right lower leg: Edema present.      Left lower leg: Edema present.   Skin:     General: Skin is warm.      Capillary Refill: Capillary refill takes less than 2 seconds.   Neurological:      General: No focal deficit present.      Mental Status: She is alert and oriented to person, place, and time.          Last Recorded Vitals  /62 (BP Location: Right arm, Patient Position: Sitting)   Pulse 60   Temp 36 °C (96.8 °F)   Resp 18   Wt 84.8 kg (187 lb)   SpO2 97%       Assessment/Plan   Nadine Qureshi is a 84 y.o. female with a history of coronary artery disease, pulmonary hypertension, unspecified congestive heart failure and atrial fibrillation status post pacemaker placement who is rate is currently ventricularly paced presented to the emergency room on direction by her cardiologist due to dyspnea.    Congestive heart failure exacerbation  , chest x-ray without acute cardiopulmonary processes  Patient reports dyspnea at rest is progressing  Type unspecified  Will consult cardiology  Defer need for echocardiogram to cardiology  Start Lasix 80 mg twice daily  Follow strict I's and O's Daily weights  Continue  cardiac diet with low sodium and 1.5 L fluid restrictions    Rhinorrhea  Will start Flonase    Coronary disease status post stent placement/hypertension  Continue Plavix and Coreg  Holding losartan due to hyperkalemia  Patient not on statin, will defer to cardiology  Monitor vitals    Hyperkalemia  Continue diuretics  Redraw now and in a.m.    Acute kidney injury on stage III chronic kidney disease  Serum creatinine baseline appears to be around 1.4-1.3  Possibly related to cardiorenal syndrome  Continue diarrhea except mentioned above  Repeat BMP in a.m.    Normocytic anemia  Monitor for further signs of blood loss  Appear stable from prior blood draws    DVT prophylaxis  Lovenox and SCDs    CODE STATUS  DNR as discussed with patient and family          Cyril Edmondson DO

## 2024-08-02 NOTE — CONSULTS
Inpatient consult to Cardiology  Consult performed by: Damaris Demarco, APRN-CNP  Consult ordered by: Kyleigh Martinez DO  Reason for consult: chf exacerbation          Reason For Consult  CHF exacerbation    History Of Present Illness  Nadine Qureshi is a 84 y.o. female with a past medical history of chronic kidney diseaae, hypertension, hyperlipidemia, coronary artery disease persistent atrial fibrillation, carotid artery stenosis, sick sinus syndrome s/p [employer] pacemaker, anemia, and pulmonary hypertension presenting with shortness of breath. She reports that she began to have worsening shortness of breath since Monday, decreased functional capacity. She is also complaining of chest heaviness worse with exertion, relieved with rest. She denies having any palpitations, dizziness or syncope.   Upon arrival to the ER chest xray was performed, and negative. BNP was elevated 717, troponin 25-> 26. Her creatnine was 1.99, previously 1.39, potassium was 5.4. 12 lead EKG performed showed ventricular paced rhythm, unable to evaluate ST elevation or depression. Compared to previous EKG from office and no changes noted. She was given furosemide 80 mg x 1 and she reports that she feels slightly better.    During her last office visit her furosemide was increased to 60 mg BID to assist with pulmonary hypertension with the plan to repeat her echocardiogram and reevaluate.     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    Surgical History  She has a past surgical history that includes Coronary angioplasty with stent  (06/07/2013); Back surgery (06/07/2013); Cardiac pacemaker placement (06/07/2013); and Cardiac catheterization (N/A, 3/27/2024).     Social History  She reports that she has never smoked. She has never used smokeless tobacco. She reports that she does not drink alcohol and does not use drugs.    Family History  No family history on file.     Allergies  Lipitor [atorvastatin], Statins-hmg-coa reductase inhibitors, and Zocor [simvastatin]    Review of Systems  Review of Systems   Constitutional:  Negative for activity change, appetite change, chills, fatigue and fever.   HENT:  Negative for congestion.    Respiratory:  Positive for chest tightness and shortness of breath. Negative for apnea, cough, wheezing and stridor.    Cardiovascular:  Positive for leg swelling. Negative for chest pain and palpitations.   Gastrointestinal:  Negative for abdominal distention, abdominal pain, blood in stool, diarrhea, nausea and vomiting.   Endocrine: Negative for cold intolerance, heat intolerance, polydipsia, polyphagia and polyuria.   Genitourinary:  Negative for frequency and urgency.   Musculoskeletal:  Negative for arthralgias, back pain, gait problem, joint swelling, myalgias, neck pain and neck stiffness.   Skin:  Negative for color change and pallor.   Neurological:  Negative for dizziness and light-headedness.   Psychiatric/Behavioral:  Negative for behavioral problems.          Physical Exam  Physical Exam  Constitutional:       Appearance: Normal appearance.   HENT:      Head: Normocephalic and atraumatic.      Nose: Nose normal. No congestion.   Eyes:      Extraocular Movements: Extraocular movements intact.      Pupils: Pupils are equal, round, and reactive to light.   Cardiovascular:      Rate and Rhythm: Normal rate. Rhythm irregular.      Pulses: Normal pulses.      Heart sounds: Murmur heard.      No friction rub. No gallop.   Pulmonary:      Effort: Pulmonary effort is normal. No tachypnea, bradypnea, accessory  muscle usage or respiratory distress.      Breath sounds: Normal breath sounds. No stridor. No wheezing, rhonchi or rales.   Chest:      Chest wall: No tenderness.   Abdominal:      General: Bowel sounds are normal. There is no distension.      Palpations: Abdomen is soft.      Tenderness: There is no abdominal tenderness.   Musculoskeletal:         General: Normal range of motion.      Cervical back: Normal range of motion and neck supple.   Skin:     General: Skin is warm and dry.      Capillary Refill: Capillary refill takes less than 2 seconds.   Neurological:      General: No focal deficit present.      Mental Status: She is alert and oriented to person, place, and time. Mental status is at baseline.   Psychiatric:         Mood and Affect: Mood normal.         Behavior: Behavior normal.          Last Recorded Vitals  /62 (BP Location: Right arm, Patient Position: Sitting)   Pulse 60   Temp 36 °C (96.8 °F)   Resp 18   Wt 84.8 kg (187 lb)   SpO2 97%     Relevant Results  Results for orders placed or performed during the hospital encounter of 08/02/24 (from the past 24 hour(s))   CBC and Auto Differential   Result Value Ref Range    WBC 4.8 4.4 - 11.3 x10*3/uL    nRBC 0.0 0.0 - 0.0 /100 WBCs    RBC 3.58 (L) 4.00 - 5.20 x10*6/uL    Hemoglobin 9.7 (L) 12.0 - 16.0 g/dL    Hematocrit 31.0 (L) 36.0 - 46.0 %    MCV 87 80 - 100 fL    MCH 27.1 26.0 - 34.0 pg    MCHC 31.3 (L) 32.0 - 36.0 g/dL    RDW 16.8 (H) 11.5 - 14.5 %    Platelets 158 150 - 450 x10*3/uL    Neutrophils % 63.8 40.0 - 80.0 %    Immature Granulocytes %, Automated 0.4 0.0 - 0.9 %    Lymphocytes % 20.1 13.0 - 44.0 %    Monocytes % 11.6 2.0 - 10.0 %    Eosinophils % 3.3 0.0 - 6.0 %    Basophils % 0.8 0.0 - 2.0 %    Neutrophils Absolute 3.08 1.60 - 5.50 x10*3/uL    Immature Granulocytes Absolute, Automated 0.02 0.00 - 0.50 x10*3/uL    Lymphocytes Absolute 0.97 0.80 - 3.00 x10*3/uL    Monocytes Absolute 0.56 0.05 - 0.80 x10*3/uL    Eosinophils  Absolute 0.16 0.00 - 0.40 x10*3/uL    Basophils Absolute 0.04 0.00 - 0.10 x10*3/uL   Comprehensive metabolic panel   Result Value Ref Range    Glucose 109 (H) 74 - 99 mg/dL    Sodium 135 (L) 136 - 145 mmol/L    Potassium 5.4 (H) 3.5 - 5.3 mmol/L    Chloride 100 98 - 107 mmol/L    Bicarbonate 26 21 - 32 mmol/L    Anion Gap 14 10 - 20 mmol/L    Urea Nitrogen 67 (H) 6 - 23 mg/dL    Creatinine 1.99 (H) 0.50 - 1.05 mg/dL    eGFR 25 (L) >60 mL/min/1.73m*2    Calcium 8.9 8.6 - 10.3 mg/dL    Albumin 3.9 3.4 - 5.0 g/dL    Alkaline Phosphatase 58 33 - 136 U/L    Total Protein 7.3 6.4 - 8.2 g/dL    AST 36 9 - 39 U/L    Bilirubin, Total 0.6 0.0 - 1.2 mg/dL    ALT 14 7 - 45 U/L   Troponin I, High Sensitivity   Result Value Ref Range    Troponin I, High Sensitivity 26 (H) 0 - 13 ng/L   B-Type Natriuretic Peptide   Result Value Ref Range     (H) 0 - 99 pg/mL   Lactate   Result Value Ref Range    Lactate 1.3 0.4 - 2.0 mmol/L   Blood Gas Venous Full Panel   Result Value Ref Range    POCT pH, Venous 7.39 7.33 - 7.43 pH    POCT pCO2, Venous 43 41 - 51 mm Hg    POCT pO2, Venous 19 (L) 35 - 45 mm Hg    POCT SO2, Venous 27 (L) 45 - 75 %    POCT Oxy Hemoglobin, Venous 26.4 (L) 45.0 - 75.0 %    POCT Hematocrit Calculated, Venous 29.0 (L) 36.0 - 46.0 %    POCT Sodium, Venous 137 136 - 145 mmol/L    POCT Potassium, Venous 4.7 3.5 - 5.3 mmol/L    POCT Chloride, Venous 102 98 - 107 mmol/L    POCT Ionized Calicum, Venous 1.16 1.10 - 1.33 mmol/L    POCT Glucose, Venous 109 (H) 74 - 99 mg/dL    POCT Lactate, Venous 1.6 0.4 - 2.0 mmol/L    POCT Base Excess, Venous 0.9 -2.0 - 3.0 mmol/L    POCT HCO3 Calculated, Venous 26.0 22.0 - 26.0 mmol/L    POCT Hemoglobin, Venous 9.6 (L) 12.0 - 16.0 g/dL    POCT Anion Gap, Venous 14.0 10.0 - 25.0 mmol/L    Patient Temperature      FiO2 21 %   ECG 12 lead   Result Value Ref Range    Ventricular Rate 64 BPM    Atrial Rate 63 BPM    QRS Duration 158 ms    QT Interval 442 ms    QTC Calculation(Bazett) 455  ms    R Axis -64 degrees    T Axis 102 degrees    QRS Count 10 beats    Q Onset 198 ms    T Offset 419 ms    QTC Fredericia 451 ms   Sars-CoV-2 PCR   Result Value Ref Range    Coronavirus 2019, PCR Not Detected Not Detected   Troponin I, High Sensitivity   Result Value Ref Range    Troponin I, High Sensitivity 25 (H) 0 - 13 ng/L        === 08/02/24 ===    XR CHEST 1 VIEW    - Impression -  1.  No evidence of acute cardiopulmonary process.      Signed by: Yrn Kim 8/2/2024 11:39 AM  Dictation workstation:   [id number]     No echocardiogram results found for the past 12 months     Patient Active Problem List   Diagnosis    Atrial fibrillation (Multi)    Benign essential hypertension    CAD (coronary atherosclerotic disease)    Carotid stenosis, right    Hypercholesterolemia    Major depressive disorder, recurrent episode, moderate with anxious distress (Multi)    Moderate mitral regurgitation    Moderate tricuspid regurgitation    Pulmonary hypertension, unspecified (Multi)    Sick sinus syndrome due to SA node dysfunction (Multi)    Stage 3 chronic kidney disease due to benign hypertension (Multi)    Age-related cataract of both eyes    Anemia    Bilateral edema of lower extremity    Chronic diarrhea    Congestive heart failure (Multi)    Dysfunction of both eustachian tubes    Dyspnea on exertion    Other fatigue    Rash    Stasis dermatitis, acute    Prediabetes    Acute on chronic congestive heart failure, unspecified heart failure type (Multi)          Assessment/Plan     3/11/2024 [facility]:  CONCLUSIONS:   1. Triple vessel disease.   2. RHC shows severe pulmonary HTN, mPAP 45-50 mm Hg, mPCWP 25 mm Hg, mRA 21 mm Hg.   3. Reduced cardiac output, no shunts.   LAD 75% stenosis, circumflex moderate atherosclerosis, RCA 30% stenosis    12/1/2023 Echocardiogram:  Conclusions:  LVEF 50-55%, mild LVH, moderate biatrial enlargement, mild to moderate mitral regurgitation, moderate tricuspid regurgitation, abnormal LV  relaxation,  moderate to severe pulmonary HTN and elevated estimated CVP.    #Acute on chronic diastolic congestive heart failure  -  -CXR negative for acute cardiopulmonary process  -I reviewed the Echocardiogram from above, repeat echocardiogram  -Strict I & Os  -Daily weights  -2gm na diet  -1500mL fluid restriction   - Troponin 25-> 26  - Furosemide 80 mg IVP X 1 given in ER  - continue furosemide 80 mg BID  - Recommend outpatient referral to EP to discuss RFA/ PVI    #Elevated Troponin - Non MI  #Coronary artery disease  -Troponin 25-> 26  -I reviewed the EKG, ventricular paced rhythm, compared to previous EKG in office, no changes noted.   -CXR negative  -I reviewed the echocardiogram from above  -[facility] reviewed, triple vessel disease as above  -For refractory angina despite medical management consider interventional cardiology consult to consider PCI/ feasibility of LAD PCI as vessel caliber is small  - Continue medical management of carvedilol and clopidogrel    # Severe Pulmonary Hypertension  - Repeat echocardiogram  - right heart cath reviewed as above, mPAP 45-50 mmHg    # Persistent atrial fibrillation  - I reviewed the 12 lead ECG, ventricular paced  - Continue Eliquis  - Referred to EP outpatient for RFA/PVI  - Carvedilol 12.5 mg  - echocardiogram reviewed, plan to repeat to monitor pulmonary hypertension  - TSH reviewed  from March - wnl    # Hypertension  - continue carvedilol  - Hold Losaratan for elevated creatinine   - blood pressure stable    # Acute on chronic kidney disease  - creatinine 1.99, previously 1.39  - Hold Losartan   - continue furosemide and monitor kidney function closely    # Sick sinus syndrome s/p pacemaker  - pacemaker in situ per chest xray  - ventricular paced rhythm      Damaris Demarco, APRN-CNP

## 2024-08-02 NOTE — ED PROVIDER NOTES
CC: Shortness of Breath (Pt c/o SOB with increased leg swelling since Monday.)     HPI:  Patient is an 84-year-old female with a history of congestive heart failure,  coronary artery disease status post stent and atrial fibrillation presenting to the emergency department with dyspnea on exertion.  Even going to the bathroom she has to stop [facility] to sit in order to catch her breath.  She follows with Dr. Brandt.  She was at Dr. Brandt's office this morning to have her pacemaker checked as a routine visit and did not realize she did not have a doctor's appointment and therefore came to the emergency department as her shortness of breath has sitting significantly worsening since Monday.  Feels like prior CHF exacerbations.  Takes 60 mg of Lasix twice a day.  States that the warm weather causes her to have worsening symptoms and lower extremity edema.  Denies history of DVT or PE.  States she does have some sinus congestion and a dry cough but denies fevers or chills.  Daughter states they did not come in sooner because they were trying to hold off until her doctor's appointment but when they realized they were not scheduled with the doctor today did not think that she would be able to sustain like this at home and therefore coming to the emergency department for evaluation.    Records Reviewed:  Recent available ED and inpatient notes reviewed in EMR.    PMHx/PSHx:  Per HPI.   - has a past medical history of Cellulitis of unspecified part of limb, Chronic kidney disease, stage 3 unspecified (Multi), Essential (primary) hypertension, Familial hypercholesterolemia, Other conditions influencing health status, Personal history of other diseases of the respiratory system, and Personal history of other mental and behavioral disorders.  - has a past surgical history that includes Coronary angioplasty with stent (06/07/2013); Back surgery (06/07/2013); Cardiac pacemaker placement (06/07/2013); and Cardiac catheterization  (N/A, 3/27/2024).  - has Atrial fibrillation (Multi); Benign essential hypertension; CAD (coronary atherosclerotic disease); Carotid stenosis, right; Hypercholesterolemia; Major depressive disorder, recurrent episode, moderate with anxious distress (Multi); Moderate mitral regurgitation; Moderate tricuspid regurgitation; Pulmonary hypertension, unspecified (Multi); Sick sinus syndrome due to SA node dysfunction (Multi); Stage 3 chronic kidney disease due to benign hypertension (Multi); Age-related cataract of both eyes; Anemia; Bilateral edema of lower extremity; Chronic diarrhea; Congestive heart failure (Multi); Dysfunction of both eustachian tubes; Dyspnea on exertion; Other fatigue; Rash; Stasis dermatitis, acute; and Prediabetes on their problem list.    Medications:  Reviewed in EMR. See EMR for complete list of medications and doses.    Allergies:  Lipitor [atorvastatin], Statins-hmg-coa reductase inhibitors, and Zocor [simvastatin]    Social History:  - Tobacco:  reports that she has never smoked. She has never used smokeless tobacco.   - Alcohol:  reports no history of alcohol use.   - Illicit Drugs:  reports no history of drug use.     ROS:  Per HPI.       ???????????????????????????????????????????????????????????????  Triage Vitals:  T 36 °C (96.8 °F)  HR 83  /76  RR 18  O2 99 % None (Room air)    Physical Exam  ???????????????????????????????????????????????????????????????  GEN: no acute distress  EYES: PERRL, EOMI, no scleral icterus  NECK: no JVD   ENT: mmm  CVS/CHEST: reg rate, nl rhythm, +murmur  PULM: Tachypneic, saturating appropriately on room air.  Lungs appear relatively clear bilaterally.  GI: soft, NT/ND, no rebound or guarding   EXT: 2+ LE edema  NEURO: Awake and alert, moving all extremities equally and spontaneously.  SKIN: warm, dry  PSYCH: AAOx3 answers questions appropriately    EKG: EKG read by me reviewed by me is a ventricular paced rhythm at 64 bpm.  Left axis deviation.   No significant ST segment elevation or depression.  T wave inversions in lateral leads.    Assessment and Plan:  Patient is an 84-year-old female presents to the emergency department dyspnea on minimal exertion.  She is tachypneic on my evaluation but is not tachycardic or hypoxic.  Is on dual antiplatelet therapy but no anticoagulation.  She has bilateral pitting lower extremity edema.  Appears more consistent for CHF exacerbation than a secondary etiology including PE.  However given the sinus symptoms she is also having will obtain a COVID swab.  Treated with 80 mg of Lasix.  Reach out to her primary cardiologist, Dr. Brandt who recommended admission for diuresis.  Cardiology on consult.  Disposition pending workup in the emergency department but anticipate admission.  Discussed with patient who is agreeable to plan.  Pacemaker was reviewed at outpatient cardiologist today.  Patient does have an MONTY.  BNP and troponin are up.  Admitted to medicine for CHF exacerbation with cardiology on consult.  Discussed with patient and daughter at bedside who are agreeable to plan.    ED Course:  ED Course as of 08/02/24 1210   Fri Aug 02, 2024   1129 HEMOGLOBIN(!): 9.7  Relatively stable.  Denies hematochezia or melena. [HD]      ED Course User Index  [HD] Kyleigh Martinez DO         Diagnoses as of 08/02/24 1210   Acute on chronic congestive heart failure, unspecified heart failure type (Multi)       Social Determinants Limiting Care:  None identified    Disposition:  Admitted     Kyleigh Martinez DO      Procedures ? SmartLinks last updated 8/2/2024 11:05 AM        Kyleigh Martinez,   08/02/24 1215       Kyleigh Martinez DO  08/02/24 1216

## 2024-08-03 LAB
ANION GAP SERPL CALC-SCNC: 14 MMOL/L (ref 10–20)
BUN SERPL-MCNC: 66 MG/DL (ref 6–23)
CALCIUM SERPL-MCNC: 9.1 MG/DL (ref 8.6–10.3)
CHLORIDE SERPL-SCNC: 101 MMOL/L (ref 98–107)
CO2 SERPL-SCNC: 30 MMOL/L (ref 21–32)
CREAT SERPL-MCNC: 1.85 MG/DL (ref 0.5–1.05)
EGFRCR SERPLBLD CKD-EPI 2021: 27 ML/MIN/1.73M*2
ERYTHROCYTE [DISTWIDTH] IN BLOOD BY AUTOMATED COUNT: 16.4 % (ref 11.5–14.5)
GLUCOSE SERPL-MCNC: 94 MG/DL (ref 74–99)
HCT VFR BLD AUTO: 30.2 % (ref 36–46)
HGB BLD-MCNC: 9.5 G/DL (ref 12–16)
MAGNESIUM SERPL-MCNC: 2.68 MG/DL (ref 1.6–2.4)
MCH RBC QN AUTO: 26.8 PG (ref 26–34)
MCHC RBC AUTO-ENTMCNC: 31.5 G/DL (ref 32–36)
MCV RBC AUTO: 85 FL (ref 80–100)
NRBC BLD-RTO: 0 /100 WBCS (ref 0–0)
PLATELET # BLD AUTO: 164 X10*3/UL (ref 150–450)
POTASSIUM SERPL-SCNC: 3.8 MMOL/L (ref 3.5–5.3)
RBC # BLD AUTO: 3.55 X10*6/UL (ref 4–5.2)
SODIUM SERPL-SCNC: 141 MMOL/L (ref 136–145)
WBC # BLD AUTO: 4.1 X10*3/UL (ref 4.4–11.3)

## 2024-08-03 PROCEDURE — 2500000004 HC RX 250 GENERAL PHARMACY W/ HCPCS (ALT 636 FOR OP/ED): Performed by: FAMILY MEDICINE

## 2024-08-03 PROCEDURE — 36415 COLL VENOUS BLD VENIPUNCTURE: CPT | Performed by: FAMILY MEDICINE

## 2024-08-03 PROCEDURE — 1200000002 HC GENERAL ROOM WITH TELEMETRY DAILY

## 2024-08-03 PROCEDURE — 99233 SBSQ HOSP IP/OBS HIGH 50: CPT | Performed by: FAMILY MEDICINE

## 2024-08-03 PROCEDURE — 85027 COMPLETE CBC AUTOMATED: CPT | Performed by: FAMILY MEDICINE

## 2024-08-03 PROCEDURE — 80048 BASIC METABOLIC PNL TOTAL CA: CPT | Performed by: FAMILY MEDICINE

## 2024-08-03 PROCEDURE — 83735 ASSAY OF MAGNESIUM: CPT | Performed by: FAMILY MEDICINE

## 2024-08-03 PROCEDURE — 2500000001 HC RX 250 WO HCPCS SELF ADMINISTERED DRUGS (ALT 637 FOR MEDICARE OP): Performed by: FAMILY MEDICINE

## 2024-08-03 RX ADMIN — FLUTICASONE PROPIONATE 2 SPRAY: 50 SPRAY, METERED NASAL at 08:58

## 2024-08-03 RX ADMIN — ENOXAPARIN SODIUM 30 MG: 30 INJECTION SUBCUTANEOUS at 15:15

## 2024-08-03 RX ADMIN — FUROSEMIDE 80 MG: 10 INJECTION, SOLUTION INTRAMUSCULAR; INTRAVENOUS at 09:04

## 2024-08-03 RX ADMIN — FUROSEMIDE 80 MG: 10 INJECTION, SOLUTION INTRAMUSCULAR; INTRAVENOUS at 19:35

## 2024-08-03 RX ADMIN — CLOPIDOGREL 75 MG: 75 TABLET ORAL at 08:59

## 2024-08-03 RX ADMIN — CARVEDILOL 12.5 MG: 12.5 TABLET, FILM COATED ORAL at 08:59

## 2024-08-03 RX ADMIN — Medication 2000 UNITS: at 08:59

## 2024-08-03 ASSESSMENT — COGNITIVE AND FUNCTIONAL STATUS - GENERAL
CLIMB 3 TO 5 STEPS WITH RAILING: A LITTLE
DAILY ACTIVITIY SCORE: 24
MOBILITY SCORE: 22
DAILY ACTIVITIY SCORE: 24
MOBILITY SCORE: 22
WALKING IN HOSPITAL ROOM: A LITTLE
CLIMB 3 TO 5 STEPS WITH RAILING: A LITTLE
WALKING IN HOSPITAL ROOM: A LITTLE

## 2024-08-03 ASSESSMENT — PAIN SCALES - GENERAL
PAINLEVEL_OUTOF10: 0 - NO PAIN
PAINLEVEL_OUTOF10: 0 - NO PAIN

## 2024-08-03 ASSESSMENT — PAIN - FUNCTIONAL ASSESSMENT
PAIN_FUNCTIONAL_ASSESSMENT: 0-10
PAIN_FUNCTIONAL_ASSESSMENT: 0-10

## 2024-08-03 NOTE — CARE PLAN
The patient's goals for the shift include  patient will not complain any SOB during this shift     The clinical goals for the shift include patient will continue to receive her Lasix for tonight      Problem: Respiratory  Goal: Clear secretions with interventions this shift  Outcome: Progressing     Problem: Respiratory  Goal: Minimize anxiety/maximize coping throughout shift  Outcome: Progressing     Problem: Respiratory  Goal: Minimal/no exertional discomfort or dyspnea this shift  Outcome: Progressing     Problem: Respiratory  Goal: No signs of respiratory distress (eg. Use of accessory muscles. Peds grunting)  Outcome: Progressing     Problem: Respiratory  Goal: Patent airway maintained this shift  Outcome: Progressing     Problem: Respiratory  Goal: Verbalize decreased shortness of breath this shift  Outcome: Progressing     Problem: Respiratory  Goal: Wean oxygen to maintain O2 saturation per order/standard this shift  Outcome: Progressing

## 2024-08-03 NOTE — PROGRESS NOTES
Nadine Qureshi is a 84 y.o. female on day 1 of admission presenting with Acute on chronic congestive heart failure, unspecified heart failure type (Multi).      Subjective     Nadine was sitting up on the side of her bed, reports that she has had improvement in her shortness of breath. Currently complaining of leg cramps. Discussed recent echocardiogram, questions answered. Daughter at bedside.     Review of systems:  Constitutional: negative for fever, chills, or malaise  Neuro: negative for dizziness, headache, numbness, tingling  ENT: Negative for nasal congestion or sore throat  Resp: positive for shortness of breath, cough, or wheezing  CV: negative for chest pain, palpitations  GI: negative for abd pain, nausea, vomiting or diarrhea  : negative for dysuria, frequency, or urgency  Skin: negative for lesions, wounds, or rash  Musculoskeletal: positive for weakness, myalgia, or arthralgia  Endocrine: Negative for polyuria or polydipsia         Objective   Constitutional: Well developed, awake/alert/oriented x3, no distress, alert and cooperative  Eyes: PERRL, EOMI, clear sclera  ENMT: mucous membranes moist, no apparent injury, no lesions seen  Head/Neck: Neck supple, no apparent injury, thyroid without mass or tenderness, No JVD, trachea midline, no bruits  Respiratory/Thorax: Patent airways, diminished throughout, normal breath sounds with good chest expansion, thorax symmetric  Cardiovascular: Regular, rate and rhythm, no murmurs, 2+ equal pulses of the extremities, normal S 1and S 2  Gastrointestinal: Nondistended, soft, non-tender, no rebound tenderness or guarding, no masses palpable, no organomegaly, +BS, no bruits  Musculoskeletal: ROM intact, no joint swelling, normal strength  Extremities: normal extremities, no cyanosis , 1+ pitting edema BLE, no contusions or wounds, no clubbing  Neurological: alert and oriented x3, intact senses, motor, response and reflexes, normal strength  Lymphatic: No  significant lymphadenopathy  Psychological: Appropriate mood and behavior  Skin: Warm and dry, no lesions, no rashes      Last Recorded Vitals  /76 (BP Location: Left arm, Patient Position: Lying)   Pulse 60   Temp 36.2 °C (97.2 °F) (Temporal)   Resp 18   Ht 1.524 m (5')   Wt 83 kg (182 lb 15.7 oz)   SpO2 95%   BMI 35.74 kg/m²     Intake/Output last 3 Shifts:  I/O last 3 completed shifts:  In: 240 (2.8 mL/kg) [P.O.:240]  Out: 1850 (21.8 mL/kg) [Urine:1850 (0.6 mL/kg/hr)]  Weight: 84.8 kg   I/O this shift:  In: 100 [P.O.:100]  Out: 400 [Urine:400]    Relevant Results  Scheduled medications  carvedilol, 12.5 mg, oral, Daily  cholecalciferol, 2,000 Units, oral, Daily  clopidogrel, 75 mg, oral, Daily  enoxaparin, 30 mg, subcutaneous, q24h  fluticasone, 2 spray, Each Nostril, Daily  furosemide, 80 mg, intravenous, q12h  [Held by provider] losartan, 100 mg, oral, Daily  polyethylene glycol, 17 g, oral, Daily      Continuous medications     PRN medications  PRN medications: sodium chloride    Results for orders placed or performed during the hospital encounter of 08/02/24 (from the past 24 hour(s))   Transthoracic Echo (TTE) Complete   Result Value Ref Range    AV pk kristie 1.10 m/s    AV mn grad 3.0 mmHg    LVOT diam 2.00 cm    MV E/A ratio 4.05     LA vol index A/L 47.1 ml/m2    Tricuspid annular plane systolic excursion 1.6 cm    LV EF 58 %    RV free wall pk S' 8.27 cm/s    LVIDd 4.23 cm    RVSP 58.7 mmHg    Aortic Valve Area by Continuity of VTI 2.12 cm2    Aortic Valve Area by Continuity of Peak Velocity 1.92 cm2    AV pk grad 4.8 mmHg    LV A4C EF 40.6    Potassium   Result Value Ref Range    Potassium 4.0 3.5 - 5.3 mmol/L   Basic Metabolic Panel   Result Value Ref Range    Glucose 94 74 - 99 mg/dL    Sodium 141 136 - 145 mmol/L    Potassium 3.8 3.5 - 5.3 mmol/L    Chloride 101 98 - 107 mmol/L    Bicarbonate 30 21 - 32 mmol/L    Anion Gap 14 10 - 20 mmol/L    Urea Nitrogen 66 (H) 6 - 23 mg/dL    Creatinine  1.85 (H) 0.50 - 1.05 mg/dL    eGFR 27 (L) >60 mL/min/1.73m*2    Calcium 9.1 8.6 - 10.3 mg/dL   Magnesium   Result Value Ref Range    Magnesium 2.68 (H) 1.60 - 2.40 mg/dL   CBC   Result Value Ref Range    WBC 4.1 (L) 4.4 - 11.3 x10*3/uL    nRBC 0.0 0.0 - 0.0 /100 WBCs    RBC 3.55 (L) 4.00 - 5.20 x10*6/uL    Hemoglobin 9.5 (L) 12.0 - 16.0 g/dL    Hematocrit 30.2 (L) 36.0 - 46.0 %    MCV 85 80 - 100 fL    MCH 26.8 26.0 - 34.0 pg    MCHC 31.5 (L) 32.0 - 36.0 g/dL    RDW 16.4 (H) 11.5 - 14.5 %    Platelets 164 150 - 450 x10*3/uL       Transthoracic Echo (TTE) Complete   Final Result      XR chest 1 view   Final Result   1.  No evidence of acute cardiopulmonary process.             Signed by: Yrn Kim 8/2/2024 11:39 AM   Dictation workstation:   [id number]          Transthoracic Echo (TTE) Complete    Result Date: 8/2/2024   [facility], [address] [place] [address]               Tel [phone] and Fax [phone] TRANSTHORACIC ECHOCARDIOGRAM REPORT  Patient Name:      LETICIA Soria Physician:    [id number] Rikki Briones MD Study Date:        8/2/2024             Ordering Provider:    [id number] KEY DARLING MRN/PID:           [id number]             Fellow: Accession#:        [id number]         Nurse:                Melissa Gay RN Date of Birth/Age: 1940 / 84 years Sonographer:          Wendy Guevara RDCS Gender:            F                    Additional Staff: Height:            152.40 cm            Admit Date:           8/2/2024 Weight:            84.82 kg             Admission Status:     Inpatient -                                                               Routine BSA / BMI:         1.81 m2 / 36.52      Encounter#:           [id number]                    kg/m2 Blood  Pressure:    141/83 mmHg          Department Location:  [facility] Non                                                               Invasive Study Type:    TRANSTHORACIC ECHO (TTE) COMPLETE Diagnosis/ICD: Longstanding persistent AFib-I48.11; Pulmonary hypertension,                unspecified-I27.20; Acute on chronic diastolic (congestive) heart                failure (CHF)-I50.33 Indication:    Atrial Fibrillation, Congestive Heart Failure, PHTN CPT Code:      Echo Complete w Full Doppler-[id number] Patient History: Pacer/Defib:       Permanent pacemaker Pertinent History: HTN, Hyperlipidemia, CAD, A-Fib and SSS. chronic kidney                    disease. Study Detail: The following Echo studies were performed: 2D, M-Mode, Doppler and               color flow. Technically challenging study due to prominent lung               artifact and poor acoustic windows. Definity used as a contrast               agent for endocardial border definition. Total contrast used for               this procedure was 2.0 mL via IV push. The patient was awake.  PHYSICIAN INTERPRETATION: Left Ventricle: The left ventricular systolic function is normal, with a visually estimated ejection fraction of 55-60%. Wall motion is abnormal. The left ventricular cavity size is normal. There is mild concentric left ventricular hypertrophy. Spectral Doppler shows a restrictive pattern of left ventricular diastolic filling. Left Atrium: The left atrium is moderately dilated. Right Ventricle: The right ventricle is mildly enlarged. There is mildly reduced right ventricular systolic function. Right Atrium: The right atrium is moderately dilated. Aortic Valve: The aortic valve is trileaflet. There is mild to moderate aortic valve cusp calcification. The aortic valve dimensionless index is 0.68. There is no evidence of aortic valve regurgitation. The peak instantaneous gradient of the aortic valve is 4.8 mmHg. The mean gradient of the aortic valve is 3.0  mmHg. Mitral Valve: The mitral valve is normal in structure. There is mild to moderate mitral valve regurgitation. Tricuspid Valve: The tricuspid valve is structurally normal. There is moderate tricuspid regurgitation. Pulmonic Valve: The pulmonic valve is structurally normal. There is physiologic pulmonic valve regurgitation. Pericardium: There is no pericardial effusion noted. Aorta: The aortic root is normal. Pulmonary Artery: The tricuspid regurgitant velocity is 3.73 m/s, and with an estimated right atrial pressure of 3 mmHg, the estimated pulmonary artery pressure is moderately elevated with the RVSP at 58.7 mmHg. Pulmonary Veins: There is blunted systolic flow in the pulmonary veins. Systemic Veins: The inferior vena cava appears mildly dilated. There is poor inspiratory collapse of the IVC (less than 50%), consistent with elevated right atrial pressure.  CONCLUSIONS:  1. The left ventricular systolic function is normal, with a visually estimated ejection fraction of 55-60%.  2. Spectral Doppler shows a restrictive pattern of left ventricular diastolic filling.  3. Mildly enlarged right ventricle.  4. There is mildly reduced right ventricular systolic function.  5. The left atrium is moderately dilated.  6. The right atrium is moderately dilated.  7. Mild to moderate mitral valve regurgitation.  8. Moderate tricuspid regurgitation visualized.  9. Moderately elevated pulmonary artery pressure. CVP is elevated. QUANTITATIVE DATA SUMMARY: 2D MEASUREMENTS:                           Normal Ranges: Ao Root d:     3.10 cm    (2.0-3.7cm) IVSd:          1.23 cm    (0.6-1.1cm) LVPWd:         1.25 cm    (0.6-1.1cm) LVIDd:         4.23 cm    (3.9-5.9cm) LVIDs:         3.51 cm LV Mass Index: 104.2 g/m2 LV % FS        17.0 % LA VOLUME:                               Normal Ranges: LA Vol A4C:        88.5 ml    (22+/-6mL/m2) LA Vol A2C:        80.0 ml LA Vol BP:         85.4 ml LA Vol Index A4C:  48.8ml/m2 LA Vol Index A2C:   44.1 ml/m2 LA Vol Index BP:   47.1 ml/m2 LA Area A4C:       25.3 cm2 LA Area A2C:       23.7 cm2 LA Major Axis A4C: 6.2 cm LA Major Axis A2C: 6.0 cm LA Volume Index:   44.2 ml/m2 LA Vol A4C:        81.9 ml LA Vol A2C:        76.6 ml RA VOLUME BY A/L METHOD:                       Normal Ranges: RA Area A4C: 26.5 cm2 M-MODE MEASUREMENTS:                  Normal Ranges: Ao Root: 3.10 cm (2.0-3.7cm) AoV Exc: 2.00 cm (1.5-2.5cm) LAs:     4.30 cm (2.7-4.0cm) AORTA MEASUREMENTS:                      Normal Ranges: AoV Exc:     2.00 cm (1.5-2.5cm) Ao Sinus, d: 3.10 cm (2.1-3.5cm) Asc Ao, d:   3.00 cm (2.1-3.4cm) LV SYSTOLIC FUNCTION BY 2D PLANIMETRY (MOD):                      Normal Ranges: EF-A4C View:    41 % (>=55%) EF-A2C View:    36 % EF-Biplane:     39 % EF-Visual:      58 % LV EF Reported: 58 % LV DIASTOLIC FUNCTION:                             Normal Ranges: MV Peak E:      0.97 m/s    (0.7-1.2 m/s) MV Peak A:      0.24 m/s    (0.42-0.7 m/s) E/A Ratio:      4.05        (1.0-2.2) MV e'           0.118 m/s   (>8.0) MV lateral e'   0.15 m/s MV medial e'    0.09 m/s MV A Dur:       151.00 msec E/e' Ratio:     8.24        (<8.0) PulmV Sys Víctor:  24.00 cm/s PulmV Zapata Víctor: 50.10 cm/s PulmV S/D Víctor:  0.50 MITRAL VALVE:                 Normal Ranges: MV DT: 130 msec (150-240msec) MITRAL INSUFFICIENCY:                           Normal Ranges: PISA Radius:  0.4 cm MR VTI:       189.00 cm MR Vmax:      524.00 cm/s MR Alias Víctor: 35.1 cm/s MR Volume:    12.73 ml MR Flow Rt:   35.29 ml/s MR EROA:      0.07 cm2 AORTIC VALVE:                                   Normal Ranges: AoV Vmax:                1.10 m/s (<=1.7m/s) AoV Peak P.8 mmHg (<20mmHg) AoV Mean PG:             3.0 mmHg (1.7-11.5mmHg) LVOT Max Víctor:            0.67 m/s (<=1.1m/s) AoV VTI:                 22.50 cm (18-25cm) LVOT VTI:                15.20 cm LVOT Diameter:           2.00 cm  (1.8-2.4cm) AoV Area, VTI:           2.12 cm2 (2.5-5.5cm2) AoV  Area,Vmax:           1.92 cm2 (2.5-4.5cm2) AoV Dimensionless Index: 0.68  RIGHT VENTRICLE: TAPSE: 15.7 mm RV s'  0.08 m/s TRICUSPID VALVE/RVSP:                             Normal Ranges: Peak TR Velocity: 3.73 m/s RV Syst Pressure: 58.7 mmHg (< 30mmHg) IVC Diam:         2.32 cm PULMONIC VALVE:                      Normal Ranges: PV Max Víctor: 0.7 m/s  (0.6-0.9m/s) PV Max P.9 mmHg Pulmonary Veins: PulmV Zapata Víctor: 50.10 cm/s PulmV S/D Víctor:  0.50 PulmV Sys Víctor:  24.00 cm/s  [id number] Rikki Briones MD Electronically signed on 2024 at 7:15:29 PM  ** Final **           Assessment/Plan   Principal Problem:    Acute on chronic congestive heart failure, unspecified heart failure type (Multi)    3/11/2024 LHC:  CONCLUSIONS:   1. Triple vessel disease.   2. RHC shows severe pulmonary HTN, mPAP 45-50 mm Hg, mPCWP 25 mm Hg, mRA 21 mm Hg.   3. Reduced cardiac output, no shunts.   LAD 75% stenosis, circumflex moderate atherosclerosis, RCA 30% stenosis     2023 Echocardiogram:  Conclusions:  LVEF 50-55%, mild LVH, moderate biatrial enlargement, mild to moderate mitral regurgitation, moderate tricuspid regurgitation, abnormal LV relaxation,  moderate to severe pulmonary HTN and elevated estimated CVP.    2024 Echocardiogram  CONCLUSIONS:   1. The left ventricular systolic function is normal, with a visually estimated ejection fraction of 55-60%.   2. Spectral Doppler shows a restrictive pattern of left ventricular diastolic filling.   3. Mildly enlarged right ventricle.   4. There is mildly reduced right ventricular systolic function.   5. The left atrium is moderately dilated.   6. The right atrium is moderately dilated.   7. Mild to moderate mitral valve regurgitation.   8. Moderate tricuspid regurgitation visualized.   9. Moderately elevated pulmonary artery pressure. CVP is elevated.     #Acute on chronic diastolic congestive heart failure  -  -CXR negative for acute cardiopulmonary process  -I  reviewed the Echocardiogram from above, repeat echocardiogram  -Strict I & Os  -Daily weights  -2gm na diet  -1500mL fluid restriction   - Troponin 25-> 26  - Furosemide 80 mg IVP X 1 given in ER  - continue furosemide 80 mg BID  - Recommend outpatient referral to EP to discuss RFA/ PVI     #Elevated Troponin - Non MI  #Coronary artery disease  -Troponin 25-> 26  -I reviewed the EKG, ventricular paced rhythm, compared to previous EKG in office, no changes noted.   -CXR negative  -I reviewed the echocardiogram from above  -[facility] reviewed, triple vessel disease as above  -For refractory angina despite medical management consider interventional cardiology consult to consider PCI/ feasibility of LAD PCI as vessel caliber is small  - Discussed having PCI placed this visit, she will discuss with family and daughter, she is unsure about having another heart cath performed.  - Continue medical management of carvedilol and clopidogrel  - Is unable to tolerate statins, can discuss starting PCSK9 inhibitors outpatient.     # Moderate Pulmonary Hypertension  - Repeat echocardiogram  - right heart cath reviewed as above, mPAP 45-50 mmHg     # Persistent atrial fibrillation  - I reviewed the 12 lead ECG, ventricular paced  - Not on any current anticoagulant. She reports that she takes nattoknise at home as a blood thinner. She was previously on coumadin.   - Referred to EP outpatient for RFA/PVI  - Carvedilol 12.5 mg  - echocardiogram reviewed, plan to repeat to monitor pulmonary hypertension  - TSH reviewed  from March - wnl     # Hypertension  - continue carvedilol  - Hold Losaratan for elevated creatinine   - blood pressure stable     # Acute on chronic kidney disease  - creatinine 1.99, previously 1.39  - Hold Losartan   - continue furosemide and monitor kidney function closely     # Sick sinus syndrome s/p pacemaker  - pacemaker in situ per chest xray  - ventricular paced rhythm      Damaris Demarco, APRN-CNP

## 2024-08-03 NOTE — NURSING NOTE
Patient and family discussing wether to have heart cath or not.  Tomorrow 8/4, please let Dr. Brandt  know wether yes or no to heart cath on day shift

## 2024-08-03 NOTE — PROGRESS NOTES
Nadine Qureshi is a 84 y.o. female on day 1 of admission presenting with Acute on chronic congestive heart failure, unspecified heart failure type (Multi).      Subjective   Patient reporting an improvement in dyspnea       Objective     Last Recorded Vitals  /76 (BP Location: Left arm, Patient Position: Lying)   Pulse 60   Temp 36.2 °C (97.2 °F) (Temporal)   Resp 18   Wt 83 kg (182 lb 15.7 oz)   SpO2 95%   Intake/Output last 3 Shifts:    Intake/Output Summary (Last 24 hours) at 8/3/2024 1143  Last data filed at 8/3/2024 1052  Gross per 24 hour   Intake 340 ml   Output 2050 ml   Net -1710 ml       Admission Weight  Weight: 84.8 kg (187 lb) (08/02/24 1052)    Daily Weight  08/03/24 : 83 kg (182 lb 15.7 oz)    Image Results  Transthoracic Echo (TTE) [facility], [address] [place] [address]                Tel [phone] and Fax [phone]    TRANSTHORACIC ECHOCARDIOGRAM REPORT       Patient Name:      NADINE QURESHI      Reading Physician:    [id number] Rikki Briones MD  Study Date:        8/2/2024             Ordering Provider:    [id number] KEY CARMELITA DARLING  MRN/PID:           [id number]             Fellow:  Accession#:        [id number]         Nurse:                Melissa Gay RN  Date of Birth/Age: 1940 / 84 years Sonographer:          Wendy Guevara RDCS  Gender:            F                    Additional Staff:  Height:            152.40 cm            Admit Date:           8/2/2024  Weight:            84.82 kg             Admission Status:     Inpatient -                                                                Routine  BSA / BMI:         1.81 m2 / 36.52      Encounter#:           [id number]                     kg/m2  Blood Pressure:    141/83 mmHg           Department Location:  [facility] Non                                                                Invasive    Study Type:    TRANSTHORACIC ECHO (TTE) COMPLETE  Diagnosis/ICD: Longstanding persistent AFib-I48.11; Pulmonary hypertension,                 unspecified-I27.20; Acute on chronic diastolic (congestive) heart                 failure (CHF)-I50.33  Indication:    Atrial Fibrillation, Congestive Heart Failure, PHTN  CPT Code:      Echo Complete w Full [id number]    Patient History:  Pacer/Defib:       Permanent pacemaker  Pertinent History: HTN, Hyperlipidemia, CAD, A-Fib and SSS. chronic kidney                     disease.    Study Detail: The following Echo studies were performed: 2D, M-Mode, Doppler and                color flow. Technically challenging study due to prominent lung                artifact and poor acoustic windows. Definity used as a contrast                agent for endocardial border definition. Total contrast used for                this procedure was 2.0 mL via IV push. The patient was awake.       PHYSICIAN INTERPRETATION:  Left Ventricle: The left ventricular systolic function is normal, with a visually estimated ejection fraction of 55-60%. Wall motion is abnormal. The left ventricular cavity size is normal. There is mild concentric left ventricular hypertrophy. Spectral Doppler shows a restrictive pattern of left ventricular diastolic filling.  Left Atrium: The left atrium is moderately dilated.  Right Ventricle: The right ventricle is mildly enlarged. There is mildly reduced right ventricular systolic function.  Right Atrium: The right atrium is moderately dilated.  Aortic Valve: The aortic valve is trileaflet. There is mild to moderate aortic valve cusp calcification. The aortic valve dimensionless index is 0.68. There is no evidence of aortic valve regurgitation. The peak instantaneous gradient of the aortic valve is 4.8 mmHg. The mean gradient of the aortic valve is 3.0  mmHg.  Mitral Valve: The mitral valve is normal in structure. There is mild to moderate mitral valve regurgitation.  Tricuspid Valve: The tricuspid valve is structurally normal. There is moderate tricuspid regurgitation.  Pulmonic Valve: The pulmonic valve is structurally normal. There is physiologic pulmonic valve regurgitation.  Pericardium: There is no pericardial effusion noted.  Aorta: The aortic root is normal.  Pulmonary Artery: The tricuspid regurgitant velocity is 3.73 m/s, and with an estimated right atrial pressure of 3 mmHg, the estimated pulmonary artery pressure is moderately elevated with the RVSP at 58.7 mmHg.  Pulmonary Veins: There is blunted systolic flow in the pulmonary veins.  Systemic Veins: The inferior vena cava appears mildly dilated. There is poor inspiratory collapse of the IVC (less than 50%), consistent with elevated right atrial pressure.       CONCLUSIONS:   1. The left ventricular systolic function is normal, with a visually estimated ejection fraction of 55-60%.   2. Spectral Doppler shows a restrictive pattern of left ventricular diastolic filling.   3. Mildly enlarged right ventricle.   4. There is mildly reduced right ventricular systolic function.   5. The left atrium is moderately dilated.   6. The right atrium is moderately dilated.   7. Mild to moderate mitral valve regurgitation.   8. Moderate tricuspid regurgitation visualized.   9. Moderately elevated pulmonary artery pressure. CVP is elevated.    QUANTITATIVE DATA SUMMARY:  2D MEASUREMENTS:                            Normal Ranges:  Ao Root d:     3.10 cm    (2.0-3.7cm)  IVSd:          1.23 cm    (0.6-1.1cm)  LVPWd:         1.25 cm    (0.6-1.1cm)  LVIDd:         4.23 cm    (3.9-5.9cm)  LVIDs:         3.51 cm  LV Mass Index: 104.2 g/m2  LV % FS        17.0 %    LA VOLUME:                                Normal Ranges:  LA Vol A4C:        88.5 ml    (22+/-6mL/m2)  LA Vol A2C:        80.0 ml  LA Vol BP:         85.4 ml  LA  Vol Index A4C:  48.8ml/m2  LA Vol Index A2C:  44.1 ml/m2  LA Vol Index BP:   47.1 ml/m2  LA Area A4C:       25.3 cm2  LA Area A2C:       23.7 cm2  LA Major Axis A4C: 6.2 cm  LA Major Axis A2C: 6.0 cm  LA Volume Index:   44.2 ml/m2  LA Vol A4C:        81.9 ml  LA Vol A2C:        76.6 ml    RA VOLUME BY A/L METHOD:                        Normal Ranges:  RA Area A4C: 26.5 cm2    M-MODE MEASUREMENTS:                   Normal Ranges:  Ao Root: 3.10 cm (2.0-3.7cm)  AoV Exc: 2.00 cm (1.5-2.5cm)  LAs:     4.30 cm (2.7-4.0cm)    AORTA MEASUREMENTS:                       Normal Ranges:  AoV Exc:     2.00 cm (1.5-2.5cm)  Ao Sinus, d: 3.10 cm (2.1-3.5cm)  Asc Ao, d:   3.00 cm (2.1-3.4cm)    LV SYSTOLIC FUNCTION BY 2D PLANIMETRY (MOD):                       Normal Ranges:  EF-A4C View:    41 % (>=55%)  EF-A2C View:    36 %  EF-Biplane:     39 %  EF-Visual:      58 %  LV EF Reported: 58 %    LV DIASTOLIC FUNCTION:                              Normal Ranges:  MV Peak E:      0.97 m/s    (0.7-1.2 m/s)  MV Peak A:      0.24 m/s    (0.42-0.7 m/s)  E/A Ratio:      4.05        (1.0-2.2)  MV e'           0.118 m/s   (>8.0)  MV lateral e'   0.15 m/s  MV medial e'    0.09 m/s  MV A Dur:       151.00 msec  E/e' Ratio:     8.24        (<8.0)  PulmV Sys Víctor:  24.00 cm/s  PulmV Zapata Víctor: 50.10 cm/s  PulmV S/D Víctor:  0.50    MITRAL VALVE:                  Normal Ranges:  MV DT: 130 msec (150-240msec)    MITRAL INSUFFICIENCY:                            Normal Ranges:  PISA Radius:  0.4 cm  MR VTI:       189.00 cm  MR Vmax:      524.00 cm/s  MR Alias Víctor: 35.1 cm/s  MR Volume:    12.73 ml  MR Flow Rt:   35.29 ml/s  MR EROA:      0.07 cm2    AORTIC VALVE:                                    Normal Ranges:  AoV Vmax:                1.10 m/s (<=1.7m/s)  AoV Peak P.8 mmHg (<20mmHg)  AoV Mean PG:             3.0 mmHg (1.7-11.5mmHg)  LVOT Max Víctor:            0.67 m/s (<=1.1m/s)  AoV VTI:                 22.50 cm (18-25cm)  LVOT VTI:                 15.20 cm  LVOT Diameter:           2.00 cm  (1.8-2.4cm)  AoV Area, VTI:           2.12 cm2 (2.5-5.5cm2)  AoV Area,Vmax:           1.92 cm2 (2.5-4.5cm2)  AoV Dimensionless Index: 0.68       RIGHT VENTRICLE:  TAPSE: 15.7 mm  RV s'  0.08 m/s    TRICUSPID VALVE/RVSP:                              Normal Ranges:  Peak TR Velocity: 3.73 m/s  RV Syst Pressure: 58.7 mmHg (< 30mmHg)  IVC Diam:         2.32 cm    PULMONIC VALVE:                       Normal Ranges:  PV Max Víctor: 0.7 m/s  (0.6-0.9m/s)  PV Max P.9 mmHg    Pulmonary Veins:  PulmV Zapata Víctor: 50.10 cm/s  PulmV S/D Víctor:  0.50  PulmV Sys Víctor:  24.00 cm/s       [id number] Rikki Briones MD  Electronically signed on 2024 at 7:15:29 PM       ** Final **  ECG 12 lead  Ventricular-paced rhythm  Abnormal ECG  When compared with ECG of 2014 23:09,  Electronic ventricular pacemaker has replaced Electronic atrial pacemaker  See ED provider note for full interpretation and clinical correlation  Confirmed by Shreya Mchugh ([id number]) on 2024 12:36:55 PM  XR chest 1 view  Narrative: Interpreted By:  Yrn Kim,   STUDY:  XR CHEST 1 VIEW;  2024 11:20 am      INDICATION:  Signs/Symptoms:sob.      COMPARISON:  CT chest chest radiograph 2012      ACCESSION NUMBER(S):  [id number]      ORDERING CLINICIAN:  DEL WELLS      FINDINGS:  AP radiograph of the chest was provided.      DEVICES:  Stable left-sided ICD.      CARDIOMEDIASTINAL SILHOUETTE:  Cardiomediastinal silhouette is stable in size and  configuration.Atherosclerotic calcifications of the aortic arch.      LUNGS:  No focal consolidation. No pneumothorax. No pleural effusion. Mild  bibasilar atelectasis.      BONES:  No acute osseous changes.      Impression: 1.  No evidence of acute cardiopulmonary process.          Signed by: Yrn Kim 2024 11:39 AM  Dictation workstation:   [id number]      Physical Exam    Gen: lying comfortably in bed, not in acute  distress  HEENT: atraumatic, normocephalic  Pulm: normal respiratory effort, clear to auscultation b/l  Cardiac: RRR, no murmurs noted, normal S1/S2  GI: Soft, nontender, BS+  MSK: normal ROM without joint swelling  Extremities: no LE edema, cyanosis  Neuro: AOX3, CN II-XII grossly intact, equal b/l strength, no loss in sensation   Psych: calm and appropriate for situation      Assessment/Plan      Nadine Qureshi is a 84 y.o. female with a history of coronary artery disease, pulmonary hypertension, unspecified congestive heart failure and atrial fibrillation status post pacemaker placement who is rate is currently ventricularly paced presented to the emergency room on direction by her cardiologist due to dyspnea.     Congestive heart failure exacerbation / Severe Pulmonary Hypertension  , chest x-ray without acute cardiopulmonary processes  ECHO LVEF 55-60%, Diastolic dysfunction   Patient reports dyspnea at rest is progressing  Type unspecified  Will consult cardiology  Defer need for echocardiogram to cardiology  Continue Lasix 80 mg twice daily  Net output of 1910ml since admission  Follow strict I's and O's Daily weights  Continue cardiac diet with low sodium and 1.5 L fluid restrictions    Persistent atrial fibrillation  Continue Eliquis  Cardiology Referred to EP outpatient for RFA/PVI  Continue Carvedilol 12.5 mg    Rhinorrhea  Continue Flonase     Coronary disease status post stent placement/hypertension /Non MI related troponin elevation  Continue Plavix and Coreg  Holding losartan due to hyperkalemia  Patient not on statin, will defer to cardiology  Monitor vitals     Hyperkalemia  Continue diuretics  Redraw now and in a.m.     Acute kidney injury on stage III chronic kidney disease  Serum creatinine baseline appears to be around 1.4-1.3  Possibly related to cardiorenal syndrome  Continue diarrhea except mentioned above  Repeat BMP in a.m.     Hypertension  Continue carvedilol  Hold Losaratan for  elevated creatinine   blood pressure stable    Normocytic anemia  Monitor for further signs of blood loss  Appear stable from prior blood draws     DVT prophylaxis  Lovenox and SCDs     CODE STATUS  DNR as discussed with patient and family    Cyril Edmondson DO

## 2024-08-03 NOTE — CARE PLAN
The patient's goals for the shift include  patient will be able to rest during tonight.     The clinical goals for the shift include Patient will continue to receive Lasix during tonight.      Problem: Respiratory  Goal: Clear secretions with interventions this shift  Outcome: Progressing     Problem: Respiratory  Goal: Minimize anxiety/maximize coping throughout shift  Outcome: Progressing     Problem: Respiratory  Goal: Minimal/no exertional discomfort or dyspnea this shift  Outcome: Progressing     Problem: Respiratory  Goal: No signs of respiratory distress (eg. Use of accessory muscles. Peds grunting)  Outcome: Progressing     Problem: Respiratory  Goal: Patent airway maintained this shift  Outcome: Progressing     Problem: Respiratory  Goal: Tolerate mechanical ventilation evidenced by VS/agitation level this shift  Outcome: Progressing

## 2024-08-04 VITALS
HEART RATE: 71 BPM | RESPIRATION RATE: 18 BRPM | HEIGHT: 60 IN | DIASTOLIC BLOOD PRESSURE: 76 MMHG | TEMPERATURE: 97.9 F | OXYGEN SATURATION: 98 % | SYSTOLIC BLOOD PRESSURE: 134 MMHG | BODY MASS INDEX: 35.92 KG/M2 | WEIGHT: 182.98 LBS

## 2024-08-04 PROBLEM — I50.9 ACUTE ON CHRONIC CONGESTIVE HEART FAILURE, UNSPECIFIED HEART FAILURE TYPE (MULTI): Status: RESOLVED | Noted: 2024-08-02 | Resolved: 2024-08-04

## 2024-08-04 LAB
ALBUMIN SERPL BCP-MCNC: 3.6 G/DL (ref 3.4–5)
ANION GAP SERPL CALC-SCNC: 18 MMOL/L (ref 10–20)
BUN SERPL-MCNC: 72 MG/DL (ref 6–23)
CALCIUM SERPL-MCNC: 8.8 MG/DL (ref 8.6–10.3)
CHLORIDE SERPL-SCNC: 100 MMOL/L (ref 98–107)
CO2 SERPL-SCNC: 28 MMOL/L (ref 21–32)
CREAT SERPL-MCNC: 1.97 MG/DL (ref 0.5–1.05)
EGFRCR SERPLBLD CKD-EPI 2021: 25 ML/MIN/1.73M*2
ERYTHROCYTE [DISTWIDTH] IN BLOOD BY AUTOMATED COUNT: 16.5 % (ref 11.5–14.5)
GLUCOSE SERPL-MCNC: 96 MG/DL (ref 74–99)
HCT VFR BLD AUTO: 29.3 % (ref 36–46)
HGB BLD-MCNC: 9.1 G/DL (ref 12–16)
MCH RBC QN AUTO: 26.7 PG (ref 26–34)
MCHC RBC AUTO-ENTMCNC: 31.1 G/DL (ref 32–36)
MCV RBC AUTO: 86 FL (ref 80–100)
NRBC BLD-RTO: 0 /100 WBCS (ref 0–0)
PHOSPHATE SERPL-MCNC: 4 MG/DL (ref 2.5–4.9)
PLATELET # BLD AUTO: 154 X10*3/UL (ref 150–450)
POTASSIUM SERPL-SCNC: 3.7 MMOL/L (ref 3.5–5.3)
RBC # BLD AUTO: 3.41 X10*6/UL (ref 4–5.2)
SODIUM SERPL-SCNC: 142 MMOL/L (ref 136–145)
WBC # BLD AUTO: 4.7 X10*3/UL (ref 4.4–11.3)

## 2024-08-04 PROCEDURE — 36415 COLL VENOUS BLD VENIPUNCTURE: CPT | Performed by: FAMILY MEDICINE

## 2024-08-04 PROCEDURE — 85027 COMPLETE CBC AUTOMATED: CPT | Performed by: FAMILY MEDICINE

## 2024-08-04 PROCEDURE — 80069 RENAL FUNCTION PANEL: CPT | Performed by: FAMILY MEDICINE

## 2024-08-04 PROCEDURE — 2500000002 HC RX 250 W HCPCS SELF ADMINISTERED DRUGS (ALT 637 FOR MEDICARE OP, ALT 636 FOR OP/ED): Performed by: FAMILY MEDICINE

## 2024-08-04 PROCEDURE — 99238 HOSP IP/OBS DSCHRG MGMT 30/<: CPT | Performed by: FAMILY MEDICINE

## 2024-08-04 PROCEDURE — 2500000001 HC RX 250 WO HCPCS SELF ADMINISTERED DRUGS (ALT 637 FOR MEDICARE OP): Performed by: FAMILY MEDICINE

## 2024-08-04 RX ORDER — LOSARTAN POTASSIUM 100 MG/1
100 TABLET ORAL DAILY
Start: 2024-08-07

## 2024-08-04 RX ORDER — FLUTICASONE PROPIONATE 50 MCG
2 SPRAY, SUSPENSION (ML) NASAL DAILY
Qty: 16 G | Refills: 0 | Status: SHIPPED | OUTPATIENT
Start: 2024-08-05

## 2024-08-04 RX ADMIN — CLOPIDOGREL 75 MG: 75 TABLET ORAL at 08:44

## 2024-08-04 RX ADMIN — FLUTICASONE PROPIONATE 2 SPRAY: 50 SPRAY, METERED NASAL at 08:56

## 2024-08-04 RX ADMIN — CARVEDILOL 12.5 MG: 12.5 TABLET, FILM COATED ORAL at 08:44

## 2024-08-04 RX ADMIN — Medication 2000 UNITS: at 08:44

## 2024-08-04 ASSESSMENT — COGNITIVE AND FUNCTIONAL STATUS - GENERAL
WALKING IN HOSPITAL ROOM: A LITTLE
CLIMB 3 TO 5 STEPS WITH RAILING: A LITTLE
DAILY ACTIVITIY SCORE: 24
MOBILITY SCORE: 22

## 2024-08-04 ASSESSMENT — PAIN SCALES - GENERAL: PAINLEVEL_OUTOF10: 0 - NO PAIN

## 2024-08-04 ASSESSMENT — PAIN - FUNCTIONAL ASSESSMENT: PAIN_FUNCTIONAL_ASSESSMENT: 0-10

## 2024-08-04 NOTE — PROGRESS NOTES
08/04/24 1114   Discharge Planning   Living Arrangements Children;Family members   Support Systems Children;Family members   Assistance Needed A&Ox3, independent with ADLs, no DME   Type of Residence Private residence   Home or Post Acute Services None   Expected Discharge Disposition Home   Does the patient need discharge transport arranged? No

## 2024-08-04 NOTE — DISCHARGE SUMMARY
Discharge Diagnosis  Acute on chronic diastolic heart failure, severe pulmonary hypertension, persistent atrial fibrillation, rhinorrhea, coronary disease status post and placement, non-MI related troponin elevation, hypertension, hyperkalemia, acute kidney injury on stage III chronic kidney disease, hypertension, normocytic anemia  Issues Requiring Follow-Up  Please follow-up with PCP and cardiology within the next week  Follow-up for BMP to check renal function    Discharge Meds     Your medication list        START taking these medications        Instructions Last Dose Given Next Dose Due   fluticasone 50 mcg/actuation nasal spray  Commonly known as: Flonase  Start taking on: August 5, 2024      Administer 2 sprays into each nostril once daily. Shake gently. Before first use, prime pump. After use, clean tip and replace cap.              CHANGE how you take these medications        Instructions Last Dose Given Next Dose Due   losartan 100 mg tablet  Commonly known as: Cozaar  Start taking on: August 7, 2024  What changed: These instructions start on August 7, 2024. If you are unsure what to do until then, ask your doctor or other care provider.      Take 1 tablet (100 mg) by mouth once daily. Do not fill before August 7, 2024.              CONTINUE taking these medications        Instructions Last Dose Given Next Dose Due   carvedilol 12.5 mg tablet  Commonly known as: Coreg           cholecalciferol 50 mcg (2,000 unit) capsule  Commonly known as: Vitamin D-3           clopidogrel 75 mg tablet  Commonly known as: Plavix           furosemide 40 mg tablet  Commonly known as: Lasix           GLUCOSAMINE ORAL           NATTOKINASE ORAL           Nitrostat 0.4 mg SL tablet  Generic drug: nitroglycerin           potassium chloride CR 20 mEq ER tablet  Commonly known as: Klor-Con M20           sodium chloride 0.65 % nasal spray  Commonly known as: Ocean           UNABLE TO FIND           UNABLE TO FIND            VALMONETN ROOT ORAL                  STOP taking these medications      sotalol 80 mg tablet  Commonly known as: Betapace                  Where to Get Your Medications        These medications were sent to [facility] [address] [place], [place] - [address] [facility]  [address] [facility] [address]      Phone: [phone]   fluticasone 50 mcg/actuation nasal spray       Information about where to get these medications is not yet available    Ask your nurse or doctor about these medications  losartan 100 mg tablet         Test Results Pending At Discharge  Pending Labs       No current pending labs.            Hospital Course   Nadine Qureshi is a 84 y.o. female with a history of coronary artery disease, pulmonary hypertension, unspecified congestive heart failure and atrial fibrillation status post pacemaker placement who is rate is currently ventricularly paced presented to the emergency room on direction by her cardiologist due to dyspnea.   During her hospitalization she was placed on IV Lasix 80 mg twice a day.  She had a net output of 2760 mL.  Review of the chart appears her renal function baseline around 1.4 however due to diarrhea rhesus was around 1.9 at discharge.  She is advised to follow-up with a BMP as an outpatient prior to her visit with her PCP and/or cardiologist.  Neurology discussed having a left heart cath however the patient did not want to wait for this procedure and will discuss it further as an outpatient.  Furthermore considering history of atrial fibrillation for which her rate is currently ventricularly paced she is not on a blood thinner and takes a supplement for this.  Cardiology will follow this further.  At discharge losartan was held for several days due to acute kidney injury.  Lasix will be continued at her home dose.    Pertinent Physical Exam At Time of Discharge  Physical Exam  Gen: lying comfortably in bed, not in acute distress  HEENT: atraumatic,  normocephalic  Pulm: normal respiratory effort, clear to auscultation b/l  Cardiac: RRR, no murmurs noted, normal S1/S2  GI: Soft, nontender, BS+  MSK: normal ROM without joint swelling  Extremities: no LE edema, cyanosis  Neuro: AOX3, CN II-XII grossly intact, equal b/l strength, no loss in sensation   Psych: calm and appropriate for situation    Outpatient Follow-Up  No future appointments.      Cyril Edmondson, DO

## 2024-08-07 ENCOUNTER — TELEMEDICINE (OUTPATIENT)
Dept: PRIMARY CARE | Facility: CLINIC | Age: 84
End: 2024-08-07
Payer: COMMERCIAL

## 2024-08-07 ENCOUNTER — PATIENT OUTREACH (OUTPATIENT)
Dept: PRIMARY CARE | Facility: CLINIC | Age: 84
End: 2024-08-07

## 2024-08-07 DIAGNOSIS — I50.43 ACUTE ON CHRONIC COMBINED SYSTOLIC AND DIASTOLIC CONGESTIVE HEART FAILURE (MULTI): Primary | ICD-10-CM

## 2024-08-07 DIAGNOSIS — Z09 HOSPITAL DISCHARGE FOLLOW-UP: ICD-10-CM

## 2024-08-07 DIAGNOSIS — R05.1 ACUTE COUGH: ICD-10-CM

## 2024-08-07 DIAGNOSIS — I50.43 ACUTE ON CHRONIC COMBINED SYSTOLIC AND DIASTOLIC CONGESTIVE HEART FAILURE (MULTI): ICD-10-CM

## 2024-08-07 PROCEDURE — 1111F DSCHRG MED/CURRENT MED MERGE: CPT | Performed by: FAMILY MEDICINE

## 2024-08-07 PROCEDURE — 99212 OFFICE O/P EST SF 10 MIN: CPT | Performed by: FAMILY MEDICINE

## 2024-08-07 NOTE — PROGRESS NOTES
Subjective   Patient ID: Nadine Qureshi is a 84 y.o. female who presents for No chief complaint on file..    HPI     Virtual or Telephone Consent    A telephone visit (audio only) between the patient (at the originating site) and the provider (at the distant site) was utilized to provide this telehealth service.   Verbal consent was requested and obtained from Nadine Qureshi on this date, 08/07/24 for a telehealth visit.      Phone appt today - as power was out in office and we closed     Pt was discharged from [facility] on 8/4/24    Discharge Diagnosis  Acute on chronic diastolic heart failure, severe pulmonary hypertension, persistent atrial fibrillation, rhinorrhea, coronary disease status post and placement, non-MI related troponin elevation, hypertension, hyperkalemia, acute kidney injury on stage III chronic kidney disease, hypertension, normocytic anemia    She was started on Fluticasone   Losartan changed to 100 mg daily   Sotalol was stopped     Went to ER for dyspnea, was placed on IV lasix 80 mg BID , output of 2760 ml   Creat at 1.9 at discharge   Heart cath was discussed   Losartan had been held several days     Chest Xray on 8/2 - non acute     5 pm spoke to family member -   Terrible cough   Worse since home   No fever     Lasix is at 60 mg BID   Potassium is once a day     Using flonase -   Sinuses have been an issue for some time -     No hx of asthma         Review of Systems    Objective   There were no vitals taken for this visit.    Physical Exam    Assessment/Plan   Problem List Items Addressed This Visit             ICD-10-CM       High    Congestive heart failure (Multi) - Primary I50.9     Other Visit Diagnoses         Codes    Acute cough     R05.1          Discussed that this is likely CHF -     Try lasix 80 mg BID and double potassium     Needs to check BMP next week

## 2024-08-07 NOTE — PROGRESS NOTES
TCM outreach completed   Patient is scheduled within 14 days of discharge on (8/7/24) for hospital follow up, however was unable to reach patient during two business days after discharge despite at least two attempts made.    Moderately complex & follow-up within 14 days- bill [id number] for hospital follow up.   Highly complex and follow-up visit within 7 days-can bill [id number] for hospital follow up    * Virtual follow up needs modifier added (95 or GT)  AWV AND TCM CAN BE BILLED TOGETHER WITH 25 MODIFIER     Discharge Facility: [facility]  Discharge Diagnosis: Acute on chronic diastolic heart failure, severe pulmonary hypertension, persistent atrial fibrillation, rhinorrhea, coronary disease status post and placement, non-MI related troponin elevation, hypertension, hyperkalemia, acute kidney injury on stage III chronic kidney disease, hypertension, normocytic anemia   Admission Date: 8/2/24  Discharge Date: 8/4/24    Hospital Encounter and Summary Linked: Yes

## 2024-08-07 NOTE — SIGNIFICANT EVENT
Follow Up Phone Call    Outgoing phone call    Spoke to: Nadine Qureshi Relationship:self   Phone number: [phone]      Outcome: No answer/busy signal   Chief Complaint   Patient presents with    Shortness of Breath     Pt c/o SOB with increased leg swelling since Monday.          Diagnosis:Not applicable

## 2024-08-08 ENCOUNTER — APPOINTMENT (OUTPATIENT)
Dept: PRIMARY CARE | Facility: CLINIC | Age: 84
End: 2024-08-08
Payer: COMMERCIAL

## 2024-08-08 ENCOUNTER — HOSPITAL ENCOUNTER (EMERGENCY)
Facility: HOSPITAL | Age: 84
Discharge: HOME | End: 2024-08-08
Attending: STUDENT IN AN ORGANIZED HEALTH CARE EDUCATION/TRAINING PROGRAM
Payer: COMMERCIAL

## 2024-08-08 ENCOUNTER — APPOINTMENT (OUTPATIENT)
Dept: RADIOLOGY | Facility: HOSPITAL | Age: 84
End: 2024-08-08
Payer: COMMERCIAL

## 2024-08-08 ENCOUNTER — APPOINTMENT (OUTPATIENT)
Dept: CARDIOLOGY | Facility: HOSPITAL | Age: 84
End: 2024-08-08
Payer: COMMERCIAL

## 2024-08-08 VITALS
OXYGEN SATURATION: 97 % | TEMPERATURE: 98.1 F | WEIGHT: 181 LBS | RESPIRATION RATE: 18 BRPM | SYSTOLIC BLOOD PRESSURE: 130 MMHG | HEIGHT: 60 IN | DIASTOLIC BLOOD PRESSURE: 74 MMHG | BODY MASS INDEX: 35.53 KG/M2 | HEART RATE: 76 BPM

## 2024-08-08 DIAGNOSIS — J04.0 LARYNGITIS: ICD-10-CM

## 2024-08-08 DIAGNOSIS — I50.9 ACUTE ON CHRONIC CONGESTIVE HEART FAILURE, UNSPECIFIED HEART FAILURE TYPE (MULTI): Primary | ICD-10-CM

## 2024-08-08 DIAGNOSIS — R05.1 ACUTE COUGH: ICD-10-CM

## 2024-08-08 LAB
ALBUMIN SERPL BCP-MCNC: 4 G/DL (ref 3.4–5)
ALP SERPL-CCNC: 63 U/L (ref 33–136)
ALT SERPL W P-5'-P-CCNC: 10 U/L (ref 7–45)
ANION GAP SERPL CALC-SCNC: 16 MMOL/L (ref 10–20)
AST SERPL W P-5'-P-CCNC: 22 U/L (ref 9–39)
BASOPHILS # BLD AUTO: 0.04 X10*3/UL (ref 0–0.1)
BASOPHILS NFR BLD AUTO: 0.8 %
BILIRUB SERPL-MCNC: 0.6 MG/DL (ref 0–1.2)
BNP SERPL-MCNC: 1009 PG/ML (ref 0–99)
BUN SERPL-MCNC: 77 MG/DL (ref 6–23)
CALCIUM SERPL-MCNC: 9 MG/DL (ref 8.6–10.3)
CARDIAC TROPONIN I PNL SERPL HS: 38 NG/L (ref 0–13)
CARDIAC TROPONIN I PNL SERPL HS: 39 NG/L (ref 0–13)
CHLORIDE SERPL-SCNC: 98 MMOL/L (ref 98–107)
CO2 SERPL-SCNC: 26 MMOL/L (ref 21–32)
CREAT SERPL-MCNC: 2.19 MG/DL (ref 0.5–1.05)
EGFRCR SERPLBLD CKD-EPI 2021: 22 ML/MIN/1.73M*2
EOSINOPHIL # BLD AUTO: 0.15 X10*3/UL (ref 0–0.4)
EOSINOPHIL NFR BLD AUTO: 3 %
ERYTHROCYTE [DISTWIDTH] IN BLOOD BY AUTOMATED COUNT: 16.8 % (ref 11.5–14.5)
FLUAV RNA RESP QL NAA+PROBE: NOT DETECTED
FLUBV RNA RESP QL NAA+PROBE: NOT DETECTED
GLUCOSE SERPL-MCNC: 111 MG/DL (ref 74–99)
HCT VFR BLD AUTO: 31.2 % (ref 36–46)
HGB BLD-MCNC: 9.7 G/DL (ref 12–16)
IMM GRANULOCYTES # BLD AUTO: 0.02 X10*3/UL (ref 0–0.5)
IMM GRANULOCYTES NFR BLD AUTO: 0.4 % (ref 0–0.9)
LYMPHOCYTES # BLD AUTO: 1.15 X10*3/UL (ref 0.8–3)
LYMPHOCYTES NFR BLD AUTO: 23 %
MAGNESIUM SERPL-MCNC: 2.79 MG/DL (ref 1.6–2.4)
MCH RBC QN AUTO: 26.8 PG (ref 26–34)
MCHC RBC AUTO-ENTMCNC: 31.1 G/DL (ref 32–36)
MCV RBC AUTO: 86 FL (ref 80–100)
MONOCYTES # BLD AUTO: 0.6 X10*3/UL (ref 0.05–0.8)
MONOCYTES NFR BLD AUTO: 12 %
NEUTROPHILS # BLD AUTO: 3.04 X10*3/UL (ref 1.6–5.5)
NEUTROPHILS NFR BLD AUTO: 60.8 %
NRBC BLD-RTO: 0 /100 WBCS (ref 0–0)
PLATELET # BLD AUTO: 166 X10*3/UL (ref 150–450)
POTASSIUM SERPL-SCNC: 4.4 MMOL/L (ref 3.5–5.3)
PROT SERPL-MCNC: 7.7 G/DL (ref 6.4–8.2)
RBC # BLD AUTO: 3.62 X10*6/UL (ref 4–5.2)
S PYO DNA THROAT QL NAA+PROBE: NOT DETECTED
SARS-COV-2 RNA RESP QL NAA+PROBE: NOT DETECTED
SODIUM SERPL-SCNC: 136 MMOL/L (ref 136–145)
WBC # BLD AUTO: 5 X10*3/UL (ref 4.4–11.3)

## 2024-08-08 PROCEDURE — 83735 ASSAY OF MAGNESIUM: CPT | Performed by: PHYSICIAN ASSISTANT

## 2024-08-08 PROCEDURE — 84484 ASSAY OF TROPONIN QUANT: CPT | Performed by: PHYSICIAN ASSISTANT

## 2024-08-08 PROCEDURE — 99284 EMERGENCY DEPT VISIT MOD MDM: CPT | Performed by: STUDENT IN AN ORGANIZED HEALTH CARE EDUCATION/TRAINING PROGRAM

## 2024-08-08 PROCEDURE — 36415 COLL VENOUS BLD VENIPUNCTURE: CPT | Performed by: PHYSICIAN ASSISTANT

## 2024-08-08 PROCEDURE — 83880 ASSAY OF NATRIURETIC PEPTIDE: CPT | Performed by: PHYSICIAN ASSISTANT

## 2024-08-08 PROCEDURE — 96376 TX/PRO/DX INJ SAME DRUG ADON: CPT | Performed by: STUDENT IN AN ORGANIZED HEALTH CARE EDUCATION/TRAINING PROGRAM

## 2024-08-08 PROCEDURE — 87651 STREP A DNA AMP PROBE: CPT | Performed by: PHYSICIAN ASSISTANT

## 2024-08-08 PROCEDURE — 96374 THER/PROPH/DIAG INJ IV PUSH: CPT | Performed by: STUDENT IN AN ORGANIZED HEALTH CARE EDUCATION/TRAINING PROGRAM

## 2024-08-08 PROCEDURE — 71045 X-RAY EXAM CHEST 1 VIEW: CPT | Performed by: RADIOLOGY

## 2024-08-08 PROCEDURE — 2500000001 HC RX 250 WO HCPCS SELF ADMINISTERED DRUGS (ALT 637 FOR MEDICARE OP): Performed by: PHYSICIAN ASSISTANT

## 2024-08-08 PROCEDURE — 87636 SARSCOV2 & INF A&B AMP PRB: CPT | Performed by: PHYSICIAN ASSISTANT

## 2024-08-08 PROCEDURE — 71045 X-RAY EXAM CHEST 1 VIEW: CPT

## 2024-08-08 PROCEDURE — 2500000004 HC RX 250 GENERAL PHARMACY W/ HCPCS (ALT 636 FOR OP/ED): Performed by: PHYSICIAN ASSISTANT

## 2024-08-08 PROCEDURE — 80053 COMPREHEN METABOLIC PANEL: CPT | Performed by: PHYSICIAN ASSISTANT

## 2024-08-08 PROCEDURE — 85025 COMPLETE CBC W/AUTO DIFF WBC: CPT | Performed by: PHYSICIAN ASSISTANT

## 2024-08-08 PROCEDURE — 93005 ELECTROCARDIOGRAM TRACING: CPT

## 2024-08-08 RX ORDER — BENZONATATE 100 MG/1
200 CAPSULE ORAL ONCE
Status: COMPLETED | OUTPATIENT
Start: 2024-08-08 | End: 2024-08-08

## 2024-08-08 RX ORDER — FUROSEMIDE 10 MG/ML
40 INJECTION INTRAMUSCULAR; INTRAVENOUS ONCE
Status: COMPLETED | OUTPATIENT
Start: 2024-08-08 | End: 2024-08-08

## 2024-08-08 ASSESSMENT — PAIN - FUNCTIONAL ASSESSMENT: PAIN_FUNCTIONAL_ASSESSMENT: 0-10

## 2024-08-08 ASSESSMENT — LIFESTYLE VARIABLES
TOTAL SCORE: 0
EVER FELT BAD OR GUILTY ABOUT YOUR DRINKING: NO
HAVE YOU EVER FELT YOU SHOULD CUT DOWN ON YOUR DRINKING: NO
EVER HAD A DRINK FIRST THING IN THE MORNING TO STEADY YOUR NERVES TO GET RID OF A HANGOVER: NO
HAVE PEOPLE ANNOYED YOU BY CRITICIZING YOUR DRINKING: NO

## 2024-08-08 ASSESSMENT — PAIN DESCRIPTION - PAIN TYPE: TYPE: ACUTE PAIN

## 2024-08-08 ASSESSMENT — PAIN SCALES - GENERAL: PAINLEVEL_OUTOF10: 3

## 2024-08-08 ASSESSMENT — COLUMBIA-SUICIDE SEVERITY RATING SCALE - C-SSRS
1. IN THE PAST MONTH, HAVE YOU WISHED YOU WERE DEAD OR WISHED YOU COULD GO TO SLEEP AND NOT WAKE UP?: NO
6. HAVE YOU EVER DONE ANYTHING, STARTED TO DO ANYTHING, OR PREPARED TO DO ANYTHING TO END YOUR LIFE?: NO
2. HAVE YOU ACTUALLY HAD ANY THOUGHTS OF KILLING YOURSELF?: NO

## 2024-08-08 ASSESSMENT — PAIN DESCRIPTION - LOCATION: LOCATION: THROAT

## 2024-08-08 ASSESSMENT — PAIN DESCRIPTION - PROGRESSION: CLINICAL_PROGRESSION: NOT CHANGED

## 2024-08-08 NOTE — ED PROVIDER NOTES
"HPI   Chief Complaint   Patient presents with    Cough     PT presents to the ER with a cough and sore throat for over a week. PT was staying in the hospital for x3 days for excess fluid in her lungs, pt got home on Sunday and has been feel ill since. PT states that her throat feels \"raw\" and that she can hardly speak. Pt denies chest pain or shortness of breath. PT states that she has been coughing up clear sputum. Denies fevers.       This is an 84-year-old female with PMH CAD, pulmonary HTN, CKD, CHF, A-fib status post pacemaker presenting for evaluation of intermittently productive cough of clear sputum.  States her throat feels raw.  Losing her voice.  Currently denies any chest pain or shortness of breath, palpitations, fevers, chills, nausea, vomiting, diaphoresis, syncope.       History provided by:  Patient   used: No            Patient History   Past Medical History:   Diagnosis Date    Cellulitis of unspecified part of limb 06/15/2021    Cellulitis, leg    Chronic kidney disease, stage 3 unspecified (Multi) 07/31/2019    CKD (chronic kidney disease), stage III    Essential (primary) hypertension     Benign essential hypertension    Familial hypercholesterolemia 06/07/2013    Essential familial hypercholesterolemia    Other conditions influencing health status 06/07/2013    Coronary Artery Disease    Personal history of other diseases of the respiratory system 05/03/2018    History of acute sinusitis    Personal history of other mental and behavioral disorders 05/03/2018    History of depression     Past Surgical History:   Procedure Laterality Date    BACK SURGERY  06/07/2013    Lower Back Surgery    CARDIAC CATHETERIZATION N/A 3/27/2024    Procedure: Left & Right Heart Cath w Angiography & LV;  Surgeon: Rikki Briones MD;  Location: [facility] Cardiac Cath Lab;  Service: Cardiovascular;  Laterality: N/A;  3/27/24--900 am for [facility] 25519 for CAD with angina I25.119 and PAH " I27.20  ACAP; [employer]orationist aid, SP    CARDIAC PACEMAKER PLACEMENT  06/07/2013    Pacemaker Placement    CORONARY ANGIOPLASTY WITH STENT PLACEMENT  06/07/2013    Cath Stent Placement     No family history on file.  Social History     Tobacco Use    Smoking status: Never    Smokeless tobacco: Never   Vaping Use    Vaping status: Never Used   Substance Use Topics    Alcohol use: Never    Drug use: Never       Physical Exam   ED Triage Vitals [08/08/24 1504]   Temperature Heart Rate Respirations BP   37 °C (98.6 °F) 63 19 131/82      Pulse Ox Temp Source Heart Rate Source Patient Position   96 % Temporal Monitor Sitting      BP Location FiO2 (%)     Left arm --       Physical Exam    General: Vitals noted, no distress.  Hoarse phonation.  No stridor or trismus.  ENT: Posterior oropharynx without injection exudate or tonsillar swelling.  Midline uvula.  Moist mucous membranes.  Neck: Trachea midline.  No JVD appreciated.  Cardiac: Regular rate and rhythm.  Grade 2/6 systolic murmur heard best at the LSB  Pulmonary: Lungs clear bilaterally with good aeration. No rales, rhonchi or wheezing  Abdomen: Soft. Nontender  Extremities: 1+ peripheral edema  Skin: No rash  Neuro: No focal neurologic deficits    ED Course & MDM                  No data recorded     [place] Coma Scale Score: 15 (08/08/24 1504 : Cecily Back, EMT)                           Medical Decision Making  DDx: Pneumonia, bronchitis, pulmonary edema, pneumothorax    Patient is stable hemodynamically.  Visibly nontoxic-appearing no apparent distress.  Clinically appears to be perhaps slightly hypervolemic.  Her creatinine is 2.1 slightly worse compared to prior, her troponin is 39 delta is pending.  BNP worsened from prior is over thousand and her H&H is stable compared to prior.  Chest x-ray shows enlarged cardiac silhouette with mild vascular congestion.  Was given a dose of Lasix and Tessalon Perles.  This visit was staffed with the attending physician   Jase who will determine further care and disposition.      Disclaimer: This note was dictated using speech recognition software. An attempt at proofreading was made to minimize errors. Minor errors in transcription may be present. Please call if questions.    Amount and/or Complexity of Data Reviewed  Labs: ordered.  Radiology: ordered.  ECG/medicine tests: ordered and independent interpretation performed.     Details: EKG Interpreted by me: Pacemaker rhythm.  Rate 61.  LAD.  No acute ST elevations or depressions.         Procedure  Procedures     Tate Ambrocio PA-C  08/08/24 1947

## 2024-08-09 LAB
ATRIAL RATE: 52 BPM
Q ONSET: 197 MS
QRS COUNT: 10 BEATS
QRS DURATION: 158 MS
QT INTERVAL: 440 MS
QTC CALCULATION(BAZETT): 442 MS
QTC FREDERICIA: 442 MS
R AXIS: -57 DEGREES
T AXIS: 104 DEGREES
T OFFSET: 417 MS
VENTRICULAR RATE: 61 BPM

## 2024-08-09 NOTE — DISCHARGE INSTRUCTIONS
Please continue her Lasix and watch her weight at home.  We did discuss potential cough suppressant for home use as well as staying hydrated in the setting of laryngitis.

## 2024-08-12 ENCOUNTER — TELEPHONE (OUTPATIENT)
Dept: PRIMARY CARE | Facility: CLINIC | Age: 84
End: 2024-08-12
Payer: COMMERCIAL

## 2024-08-12 DIAGNOSIS — J01.90 ACUTE SINUSITIS, RECURRENCE NOT SPECIFIED, UNSPECIFIED LOCATION: Primary | ICD-10-CM

## 2024-08-12 RX ORDER — DOXYCYCLINE 100 MG/1
100 CAPSULE ORAL 2 TIMES DAILY
Qty: 14 CAPSULE | Refills: 0 | Status: SHIPPED | OUTPATIENT
Start: 2024-08-12 | End: 2024-08-19

## 2024-08-12 RX ORDER — BENZONATATE 200 MG/1
200 CAPSULE ORAL 3 TIMES DAILY PRN
Qty: 42 CAPSULE | Refills: 0 | Status: SHIPPED | OUTPATIENT
Start: 2024-08-12 | End: 2024-09-11

## 2024-08-12 NOTE — TELEPHONE ENCOUNTER
Spoke to ASHLI -     Cough will just not stop     Thick sinus Drainage     In ER -   BMP higher and CXR with vasc congestion     Will try doxy and benzonatate - and   I contacted her cardiologist -  DR Brandt - told ASHLI to get her an appt asap with cardiology

## 2024-08-12 NOTE — TELEPHONE ENCOUNTER
Daughter says her sister has been calling about guy.  CHF,cought, sob been to [facility], was in hosp a week before that.  Cannot come in .  What to do?   [phone]

## 2024-08-21 ENCOUNTER — APPOINTMENT (OUTPATIENT)
Dept: CARDIOLOGY | Facility: HOSPITAL | Age: 84
End: 2024-08-21
Payer: COMMERCIAL

## 2024-08-21 ENCOUNTER — APPOINTMENT (OUTPATIENT)
Dept: RADIOLOGY | Facility: HOSPITAL | Age: 84
End: 2024-08-21
Payer: COMMERCIAL

## 2024-08-21 ENCOUNTER — PHARMACY VISIT (OUTPATIENT)
Dept: PHARMACY | Facility: CLINIC | Age: 84
End: 2024-08-21

## 2024-08-21 ENCOUNTER — HOSPITAL ENCOUNTER (OUTPATIENT)
Facility: HOSPITAL | Age: 84
Setting detail: OBSERVATION
Discharge: HOME | End: 2024-08-21
Attending: EMERGENCY MEDICINE | Admitting: INTERNAL MEDICINE
Payer: COMMERCIAL

## 2024-08-21 VITALS
TEMPERATURE: 97.2 F | HEIGHT: 64 IN | RESPIRATION RATE: 17 BRPM | HEART RATE: 77 BPM | OXYGEN SATURATION: 95 % | DIASTOLIC BLOOD PRESSURE: 64 MMHG | BODY MASS INDEX: 29.81 KG/M2 | SYSTOLIC BLOOD PRESSURE: 97 MMHG | WEIGHT: 174.6 LBS

## 2024-08-21 DIAGNOSIS — M62.838 MUSCLE SPASM: ICD-10-CM

## 2024-08-21 DIAGNOSIS — J20.9 ACUTE BRONCHITIS, UNSPECIFIED ORGANISM: ICD-10-CM

## 2024-08-21 DIAGNOSIS — R05.9 COUGH, UNSPECIFIED TYPE: Primary | ICD-10-CM

## 2024-08-21 DIAGNOSIS — I50.9 ACUTE CONGESTIVE HEART FAILURE, UNSPECIFIED HEART FAILURE TYPE (MULTI): ICD-10-CM

## 2024-08-21 DIAGNOSIS — J01.00 ACUTE MAXILLARY SINUSITIS, RECURRENCE NOT SPECIFIED: ICD-10-CM

## 2024-08-21 DIAGNOSIS — J01.90 ACUTE SINUSITIS, RECURRENCE NOT SPECIFIED, UNSPECIFIED LOCATION: ICD-10-CM

## 2024-08-21 PROBLEM — R06.02 SOB (SHORTNESS OF BREATH): Status: ACTIVE | Noted: 2024-08-21

## 2024-08-21 PROBLEM — R25.8 INVOLUNTARY JERKY MOVEMENTS: Status: ACTIVE | Noted: 2024-08-21

## 2024-08-21 PROBLEM — I50.32 CHRONIC DIASTOLIC HEART FAILURE (MULTI): Status: ACTIVE | Noted: 2024-08-21

## 2024-08-21 PROBLEM — J32.0 MAXILLARY SINUSITIS: Status: ACTIVE | Noted: 2024-08-21

## 2024-08-21 PROBLEM — R25.8 INVOLUNTARY JERKY MOVEMENTS: Status: RESOLVED | Noted: 2024-08-21 | Resolved: 2024-08-21

## 2024-08-21 LAB
ALBUMIN SERPL BCP-MCNC: 3.8 G/DL (ref 3.4–5)
ALP SERPL-CCNC: 57 U/L (ref 33–136)
ALT SERPL W P-5'-P-CCNC: 10 U/L (ref 7–45)
AMMONIA PLAS-SCNC: 13 UMOL/L (ref 16–53)
ANION GAP SERPL CALC-SCNC: 16 MMOL/L (ref 10–20)
APPEARANCE UR: CLEAR
APPEARANCE UR: CLEAR
AST SERPL W P-5'-P-CCNC: 29 U/L (ref 9–39)
BASOPHILS # BLD AUTO: 0.04 X10*3/UL (ref 0–0.1)
BASOPHILS NFR BLD AUTO: 0.7 %
BILIRUB SERPL-MCNC: 0.6 MG/DL (ref 0–1.2)
BILIRUB UR STRIP.AUTO-MCNC: NEGATIVE MG/DL
BILIRUB UR STRIP.AUTO-MCNC: NEGATIVE MG/DL
BNP SERPL-MCNC: 673 PG/ML (ref 0–99)
BUN SERPL-MCNC: 76 MG/DL (ref 6–23)
CALCIUM SERPL-MCNC: 9.4 MG/DL (ref 8.6–10.3)
CARDIAC TROPONIN I PNL SERPL HS: 31 NG/L (ref 0–13)
CARDIAC TROPONIN I PNL SERPL HS: 31 NG/L (ref 0–13)
CHLORIDE SERPL-SCNC: 101 MMOL/L (ref 98–107)
CO2 SERPL-SCNC: 26 MMOL/L (ref 21–32)
COLOR UR: COLORLESS
COLOR UR: NORMAL
CREAT SERPL-MCNC: 2.13 MG/DL (ref 0.5–1.05)
EGFRCR SERPLBLD CKD-EPI 2021: 23 ML/MIN/1.73M*2
EOSINOPHIL # BLD AUTO: 0.2 X10*3/UL (ref 0–0.4)
EOSINOPHIL NFR BLD AUTO: 3.4 %
ERYTHROCYTE [DISTWIDTH] IN BLOOD BY AUTOMATED COUNT: 17 % (ref 11.5–14.5)
GLUCOSE SERPL-MCNC: 121 MG/DL (ref 74–99)
GLUCOSE UR STRIP.AUTO-MCNC: NORMAL MG/DL
GLUCOSE UR STRIP.AUTO-MCNC: NORMAL MG/DL
HCT VFR BLD AUTO: 33.3 % (ref 36–46)
HGB BLD-MCNC: 10.5 G/DL (ref 12–16)
HOLD SPECIMEN: NORMAL
IMM GRANULOCYTES # BLD AUTO: 0.02 X10*3/UL (ref 0–0.5)
IMM GRANULOCYTES NFR BLD AUTO: 0.3 % (ref 0–0.9)
KETONES UR STRIP.AUTO-MCNC: NEGATIVE MG/DL
KETONES UR STRIP.AUTO-MCNC: NEGATIVE MG/DL
LACTATE SERPL-SCNC: 1 MMOL/L (ref 0.4–2)
LEUKOCYTE ESTERASE UR QL STRIP.AUTO: NEGATIVE
LEUKOCYTE ESTERASE UR QL STRIP.AUTO: NEGATIVE
LYMPHOCYTES # BLD AUTO: 1.61 X10*3/UL (ref 0.8–3)
LYMPHOCYTES NFR BLD AUTO: 27 %
MAGNESIUM SERPL-MCNC: 2.44 MG/DL (ref 1.6–2.4)
MCH RBC QN AUTO: 27.3 PG (ref 26–34)
MCHC RBC AUTO-ENTMCNC: 31.5 G/DL (ref 32–36)
MCV RBC AUTO: 87 FL (ref 80–100)
MONOCYTES # BLD AUTO: 0.66 X10*3/UL (ref 0.05–0.8)
MONOCYTES NFR BLD AUTO: 11.1 %
NEUTROPHILS # BLD AUTO: 3.43 X10*3/UL (ref 1.6–5.5)
NEUTROPHILS NFR BLD AUTO: 57.5 %
NITRITE UR QL STRIP.AUTO: NEGATIVE
NITRITE UR QL STRIP.AUTO: NEGATIVE
NRBC BLD-RTO: 0 /100 WBCS (ref 0–0)
PH UR STRIP.AUTO: 6 [PH]
PH UR STRIP.AUTO: 6 [PH]
PLATELET # BLD AUTO: 223 X10*3/UL (ref 150–450)
POTASSIUM SERPL-SCNC: 4.9 MMOL/L (ref 3.5–5.3)
PROT SERPL-MCNC: 7.5 G/DL (ref 6.4–8.2)
PROT UR STRIP.AUTO-MCNC: NEGATIVE MG/DL
PROT UR STRIP.AUTO-MCNC: NEGATIVE MG/DL
RBC # BLD AUTO: 3.85 X10*6/UL (ref 4–5.2)
RBC # UR STRIP.AUTO: NEGATIVE /UL
RBC # UR STRIP.AUTO: NEGATIVE /UL
SARS-COV-2 RNA RESP QL NAA+PROBE: NOT DETECTED
SODIUM SERPL-SCNC: 138 MMOL/L (ref 136–145)
SP GR UR STRIP.AUTO: 1.01
SP GR UR STRIP.AUTO: 1.01
UROBILINOGEN UR STRIP.AUTO-MCNC: NORMAL MG/DL
UROBILINOGEN UR STRIP.AUTO-MCNC: NORMAL MG/DL
WBC # BLD AUTO: 6 X10*3/UL (ref 4.4–11.3)

## 2024-08-21 PROCEDURE — 87635 SARS-COV-2 COVID-19 AMP PRB: CPT | Performed by: EMERGENCY MEDICINE

## 2024-08-21 PROCEDURE — 83605 ASSAY OF LACTIC ACID: CPT | Performed by: EMERGENCY MEDICINE

## 2024-08-21 PROCEDURE — 2500000002 HC RX 250 W HCPCS SELF ADMINISTERED DRUGS (ALT 637 FOR MEDICARE OP, ALT 636 FOR OP/ED): Performed by: INTERNAL MEDICINE

## 2024-08-21 PROCEDURE — 71045 X-RAY EXAM CHEST 1 VIEW: CPT

## 2024-08-21 PROCEDURE — 85025 COMPLETE CBC W/AUTO DIFF WBC: CPT | Performed by: EMERGENCY MEDICINE

## 2024-08-21 PROCEDURE — 83735 ASSAY OF MAGNESIUM: CPT | Performed by: EMERGENCY MEDICINE

## 2024-08-21 PROCEDURE — 2500000004 HC RX 250 GENERAL PHARMACY W/ HCPCS (ALT 636 FOR OP/ED): Performed by: INTERNAL MEDICINE

## 2024-08-21 PROCEDURE — 71250 CT THORAX DX C-: CPT | Performed by: STUDENT IN AN ORGANIZED HEALTH CARE EDUCATION/TRAINING PROGRAM

## 2024-08-21 PROCEDURE — 71250 CT THORAX DX C-: CPT

## 2024-08-21 PROCEDURE — 36415 COLL VENOUS BLD VENIPUNCTURE: CPT | Performed by: EMERGENCY MEDICINE

## 2024-08-21 PROCEDURE — 70450 CT HEAD/BRAIN W/O DYE: CPT

## 2024-08-21 PROCEDURE — 2500000005 HC RX 250 GENERAL PHARMACY W/O HCPCS: Performed by: INTERNAL MEDICINE

## 2024-08-21 PROCEDURE — 84484 ASSAY OF TROPONIN QUANT: CPT | Performed by: EMERGENCY MEDICINE

## 2024-08-21 PROCEDURE — 83880 ASSAY OF NATRIURETIC PEPTIDE: CPT | Performed by: EMERGENCY MEDICINE

## 2024-08-21 PROCEDURE — 99239 HOSP IP/OBS DSCHRG MGMT >30: CPT | Performed by: PHYSICIAN ASSISTANT

## 2024-08-21 PROCEDURE — 2500000001 HC RX 250 WO HCPCS SELF ADMINISTERED DRUGS (ALT 637 FOR MEDICARE OP): Performed by: PHYSICIAN ASSISTANT

## 2024-08-21 PROCEDURE — G0378 HOSPITAL OBSERVATION PER HR: HCPCS

## 2024-08-21 PROCEDURE — 71045 X-RAY EXAM CHEST 1 VIEW: CPT | Performed by: RADIOLOGY

## 2024-08-21 PROCEDURE — 80053 COMPREHEN METABOLIC PANEL: CPT | Performed by: EMERGENCY MEDICINE

## 2024-08-21 PROCEDURE — 93005 ELECTROCARDIOGRAM TRACING: CPT

## 2024-08-21 PROCEDURE — 99285 EMERGENCY DEPT VISIT HI MDM: CPT | Mod: 25

## 2024-08-21 PROCEDURE — RXMED WILLOW AMBULATORY MEDICATION CHARGE

## 2024-08-21 PROCEDURE — 96374 THER/PROPH/DIAG INJ IV PUSH: CPT

## 2024-08-21 PROCEDURE — 96375 TX/PRO/DX INJ NEW DRUG ADDON: CPT

## 2024-08-21 PROCEDURE — 81003 URINALYSIS AUTO W/O SCOPE: CPT | Performed by: EMERGENCY MEDICINE

## 2024-08-21 PROCEDURE — 2500000004 HC RX 250 GENERAL PHARMACY W/ HCPCS (ALT 636 FOR OP/ED): Performed by: EMERGENCY MEDICINE

## 2024-08-21 PROCEDURE — 70450 CT HEAD/BRAIN W/O DYE: CPT | Performed by: RADIOLOGY

## 2024-08-21 PROCEDURE — 2500000004 HC RX 250 GENERAL PHARMACY W/ HCPCS (ALT 636 FOR OP/ED)

## 2024-08-21 PROCEDURE — 2500000001 HC RX 250 WO HCPCS SELF ADMINISTERED DRUGS (ALT 637 FOR MEDICARE OP): Performed by: INTERNAL MEDICINE

## 2024-08-21 PROCEDURE — 81003 URINALYSIS AUTO W/O SCOPE: CPT | Performed by: INTERNAL MEDICINE

## 2024-08-21 PROCEDURE — 99222 1ST HOSP IP/OBS MODERATE 55: CPT | Performed by: INTERNAL MEDICINE

## 2024-08-21 PROCEDURE — 96372 THER/PROPH/DIAG INJ SC/IM: CPT | Mod: 59 | Performed by: INTERNAL MEDICINE

## 2024-08-21 PROCEDURE — 82140 ASSAY OF AMMONIA: CPT | Performed by: INTERNAL MEDICINE

## 2024-08-21 RX ORDER — FUROSEMIDE 40 MG/1
80 TABLET ORAL
Status: DISCONTINUED | OUTPATIENT
Start: 2024-08-21 | End: 2024-08-21 | Stop reason: HOSPADM

## 2024-08-21 RX ORDER — CYCLOBENZAPRINE HCL 5 MG
5 TABLET ORAL 3 TIMES DAILY PRN
Qty: 15 TABLET | Refills: 0 | Status: SHIPPED | OUTPATIENT
Start: 2024-08-21 | End: 2024-08-26 | Stop reason: SDUPTHER

## 2024-08-21 RX ORDER — AMOXICILLIN AND CLAVULANATE POTASSIUM 500; 125 MG/1; MG/1
1 TABLET, FILM COATED ORAL EVERY 12 HOURS SCHEDULED
Qty: 9 TABLET | Refills: 0 | Status: SHIPPED | OUTPATIENT
Start: 2024-08-21 | End: 2024-08-26 | Stop reason: ALTCHOICE

## 2024-08-21 RX ORDER — CHOLECALCIFEROL (VITAMIN D3) 25 MCG
2000 TABLET ORAL DAILY
Status: DISCONTINUED | OUTPATIENT
Start: 2024-08-21 | End: 2024-08-21 | Stop reason: HOSPADM

## 2024-08-21 RX ORDER — LORAZEPAM 2 MG/ML
1 INJECTION INTRAMUSCULAR ONCE
Status: COMPLETED | OUTPATIENT
Start: 2024-08-21 | End: 2024-08-21

## 2024-08-21 RX ORDER — ALBUTEROL SULFATE 0.83 MG/ML
2.5 SOLUTION RESPIRATORY (INHALATION) EVERY 6 HOURS PRN
Status: DISCONTINUED | OUTPATIENT
Start: 2024-08-21 | End: 2024-08-21

## 2024-08-21 RX ORDER — ENOXAPARIN SODIUM 100 MG/ML
30 INJECTION SUBCUTANEOUS EVERY 24 HOURS
Status: DISCONTINUED | OUTPATIENT
Start: 2024-08-21 | End: 2024-08-21

## 2024-08-21 RX ORDER — ORPHENADRINE CITRATE 100 MG/1
100 TABLET, EXTENDED RELEASE ORAL 2 TIMES DAILY PRN
Status: DISCONTINUED | OUTPATIENT
Start: 2024-08-21 | End: 2024-08-21 | Stop reason: HOSPADM

## 2024-08-21 RX ORDER — CLOPIDOGREL BISULFATE 75 MG/1
75 TABLET ORAL DAILY
Status: DISCONTINUED | OUTPATIENT
Start: 2024-08-21 | End: 2024-08-21 | Stop reason: HOSPADM

## 2024-08-21 RX ORDER — POTASSIUM CHLORIDE 20 MEQ/1
20 TABLET, EXTENDED RELEASE ORAL DAILY
Status: DISCONTINUED | OUTPATIENT
Start: 2024-08-21 | End: 2024-08-21 | Stop reason: HOSPADM

## 2024-08-21 RX ORDER — BENZONATATE 100 MG/1
200 CAPSULE ORAL 3 TIMES DAILY
Status: DISCONTINUED | OUTPATIENT
Start: 2024-08-21 | End: 2024-08-21 | Stop reason: HOSPADM

## 2024-08-21 RX ORDER — ORPHENADRINE CITRATE 30 MG/ML
60 INJECTION INTRAMUSCULAR; INTRAVENOUS ONCE
Status: COMPLETED | OUTPATIENT
Start: 2024-08-21 | End: 2024-08-21

## 2024-08-21 RX ORDER — NITROGLYCERIN 0.4 MG/1
0.4 TABLET SUBLINGUAL EVERY 5 MIN PRN
Status: DISCONTINUED | OUTPATIENT
Start: 2024-08-21 | End: 2024-08-21 | Stop reason: HOSPADM

## 2024-08-21 RX ORDER — BENZONATATE 100 MG/1
200 CAPSULE ORAL 3 TIMES DAILY PRN
Status: DISCONTINUED | OUTPATIENT
Start: 2024-08-21 | End: 2024-08-21

## 2024-08-21 RX ORDER — LORAZEPAM 2 MG/ML
0.5 INJECTION INTRAMUSCULAR EVERY 8 HOURS PRN
Status: DISCONTINUED | OUTPATIENT
Start: 2024-08-21 | End: 2024-08-21 | Stop reason: HOSPADM

## 2024-08-21 RX ORDER — HEPARIN SODIUM 5000 [USP'U]/ML
5000 INJECTION, SOLUTION INTRAVENOUS; SUBCUTANEOUS EVERY 8 HOURS
Status: DISCONTINUED | OUTPATIENT
Start: 2024-08-21 | End: 2024-08-21 | Stop reason: HOSPADM

## 2024-08-21 RX ORDER — LORAZEPAM 2 MG/ML
INJECTION INTRAMUSCULAR
Status: COMPLETED
Start: 2024-08-21 | End: 2024-08-21

## 2024-08-21 RX ORDER — ALBUTEROL SULFATE 0.83 MG/ML
2.5 SOLUTION RESPIRATORY (INHALATION)
Status: DISCONTINUED | OUTPATIENT
Start: 2024-08-21 | End: 2024-08-21 | Stop reason: HOSPADM

## 2024-08-21 RX ORDER — POLYETHYLENE GLYCOL 3350 17 G/17G
17 POWDER, FOR SOLUTION ORAL DAILY PRN
Status: DISCONTINUED | OUTPATIENT
Start: 2024-08-21 | End: 2024-08-21 | Stop reason: HOSPADM

## 2024-08-21 RX ORDER — BENZONATATE 200 MG/1
200 CAPSULE ORAL 3 TIMES DAILY PRN
Qty: 42 CAPSULE | Refills: 0 | Status: SHIPPED | OUTPATIENT
Start: 2024-08-21

## 2024-08-21 RX ORDER — AMOXICILLIN AND CLAVULANATE POTASSIUM 500; 125 MG/1; MG/1
1 TABLET, FILM COATED ORAL EVERY 12 HOURS SCHEDULED
Status: DISCONTINUED | OUTPATIENT
Start: 2024-08-21 | End: 2024-08-21 | Stop reason: HOSPADM

## 2024-08-21 RX ORDER — FUROSEMIDE 10 MG/ML
80 INJECTION INTRAMUSCULAR; INTRAVENOUS EVERY 12 HOURS
Status: DISCONTINUED | OUTPATIENT
Start: 2024-08-21 | End: 2024-08-21

## 2024-08-21 RX ORDER — ATENOLOL 25 MG/1
TABLET ORAL DAILY
Status: ON HOLD | COMMUNITY
End: 2024-08-21 | Stop reason: ENTERED-IN-ERROR

## 2024-08-21 RX ORDER — FUROSEMIDE 10 MG/ML
80 INJECTION INTRAMUSCULAR; INTRAVENOUS 2 TIMES DAILY
Status: DISCONTINUED | OUTPATIENT
Start: 2024-08-21 | End: 2024-08-21

## 2024-08-21 RX ORDER — FLUTICASONE PROPIONATE 50 MCG
2 SPRAY, SUSPENSION (ML) NASAL DAILY
Status: DISCONTINUED | OUTPATIENT
Start: 2024-08-21 | End: 2024-08-21 | Stop reason: HOSPADM

## 2024-08-21 RX ORDER — LOSARTAN POTASSIUM 50 MG/1
100 TABLET ORAL DAILY
Status: DISCONTINUED | OUTPATIENT
Start: 2024-08-21 | End: 2024-08-21 | Stop reason: HOSPADM

## 2024-08-21 RX ORDER — CARVEDILOL 12.5 MG/1
12.5 TABLET ORAL DAILY
Status: DISCONTINUED | OUTPATIENT
Start: 2024-08-21 | End: 2024-08-21 | Stop reason: HOSPADM

## 2024-08-21 RX ORDER — ACETAMINOPHEN 325 MG/1
650 TABLET ORAL EVERY 6 HOURS PRN
Status: DISCONTINUED | OUTPATIENT
Start: 2024-08-21 | End: 2024-08-21 | Stop reason: HOSPADM

## 2024-08-21 SDOH — SOCIAL STABILITY: SOCIAL INSECURITY: HAVE YOU HAD ANY THOUGHTS OF HARMING ANYONE ELSE?: NO

## 2024-08-21 SDOH — SOCIAL STABILITY: SOCIAL INSECURITY: ARE THERE ANY APPARENT SIGNS OF INJURIES/BEHAVIORS THAT COULD BE RELATED TO ABUSE/NEGLECT?: NO

## 2024-08-21 SDOH — SOCIAL STABILITY: SOCIAL INSECURITY: DOES ANYONE TRY TO KEEP YOU FROM HAVING/CONTACTING OTHER FRIENDS OR DOING THINGS OUTSIDE YOUR HOME?: NO

## 2024-08-21 SDOH — SOCIAL STABILITY: SOCIAL INSECURITY: HAVE YOU HAD THOUGHTS OF HARMING ANYONE ELSE?: NO

## 2024-08-21 SDOH — SOCIAL STABILITY: SOCIAL INSECURITY: ARE YOU OR HAVE YOU BEEN THREATENED OR ABUSED PHYSICALLY, EMOTIONALLY, OR SEXUALLY BY ANYONE?: NO

## 2024-08-21 SDOH — SOCIAL STABILITY: SOCIAL INSECURITY: ABUSE: ADULT

## 2024-08-21 SDOH — SOCIAL STABILITY: SOCIAL INSECURITY: DO YOU FEEL UNSAFE GOING BACK TO THE PLACE WHERE YOU ARE LIVING?: NO

## 2024-08-21 SDOH — SOCIAL STABILITY: SOCIAL INSECURITY: HAS ANYONE EVER THREATENED TO HURT YOUR FAMILY OR YOUR PETS?: NO

## 2024-08-21 ASSESSMENT — COGNITIVE AND FUNCTIONAL STATUS - GENERAL
PATIENT BASELINE BEDBOUND: NO
CLIMB 3 TO 5 STEPS WITH RAILING: A LOT
MOVING TO AND FROM BED TO CHAIR: A LITTLE
TURNING FROM BACK TO SIDE WHILE IN FLAT BAD: A LITTLE
HELP NEEDED FOR BATHING: A LITTLE
MOVING TO AND FROM BED TO CHAIR: A LITTLE
PERSONAL GROOMING: A LITTLE
MOBILITY SCORE: 17
STANDING UP FROM CHAIR USING ARMS: A LITTLE
CLIMB 3 TO 5 STEPS WITH RAILING: A LOT
MOVING FROM LYING ON BACK TO SITTING ON SIDE OF FLAT BED WITH BEDRAILS: A LITTLE
TOILETING: A LITTLE
MOBILITY SCORE: 17
DRESSING REGULAR LOWER BODY CLOTHING: A LITTLE
DRESSING REGULAR UPPER BODY CLOTHING: A LITTLE
STANDING UP FROM CHAIR USING ARMS: A LITTLE
WALKING IN HOSPITAL ROOM: A LITTLE
TOILETING: A LITTLE
TURNING FROM BACK TO SIDE WHILE IN FLAT BAD: A LITTLE
PERSONAL GROOMING: A LITTLE
DRESSING REGULAR UPPER BODY CLOTHING: A LITTLE
DAILY ACTIVITIY SCORE: 19
MOVING FROM LYING ON BACK TO SITTING ON SIDE OF FLAT BED WITH BEDRAILS: A LITTLE
WALKING IN HOSPITAL ROOM: A LITTLE
DRESSING REGULAR LOWER BODY CLOTHING: A LITTLE
EATING MEALS: A LITTLE
HELP NEEDED FOR BATHING: A LITTLE
DAILY ACTIVITIY SCORE: 18

## 2024-08-21 ASSESSMENT — ENCOUNTER SYMPTOMS
TREMORS: 1
COUGH: 1
ABDOMINAL PAIN: 0
CHILLS: 0
PALPITATIONS: 0
SHORTNESS OF BREATH: 0
DIZZINESS: 0
ABDOMINAL DISTENTION: 0
FEVER: 0

## 2024-08-21 ASSESSMENT — ACTIVITIES OF DAILY LIVING (ADL)
DRESSING YOURSELF: NEEDS ASSISTANCE
BATHING: NEEDS ASSISTANCE
TOILETING: NEEDS ASSISTANCE
ASSISTIVE_DEVICE: WALKER
GROOMING: NEEDS ASSISTANCE
HEARING - RIGHT EAR: HEARING AID
ADEQUATE_TO_COMPLETE_ADL: YES
HEARING - LEFT EAR: HEARING AID
FEEDING YOURSELF: INDEPENDENT
LACK_OF_TRANSPORTATION: NO
PATIENT'S MEMORY ADEQUATE TO SAFELY COMPLETE DAILY ACTIVITIES?: YES
WALKS IN HOME: NEEDS ASSISTANCE
LACK_OF_TRANSPORTATION: NO
JUDGMENT_ADEQUATE_SAFELY_COMPLETE_DAILY_ACTIVITIES: YES

## 2024-08-21 ASSESSMENT — PATIENT HEALTH QUESTIONNAIRE - PHQ9
1. LITTLE INTEREST OR PLEASURE IN DOING THINGS: NOT AT ALL
SUM OF ALL RESPONSES TO PHQ9 QUESTIONS 1 & 2: 0
2. FEELING DOWN, DEPRESSED OR HOPELESS: NOT AT ALL

## 2024-08-21 ASSESSMENT — LIFESTYLE VARIABLES
SKIP TO QUESTIONS 9-10: 1
EVER FELT BAD OR GUILTY ABOUT YOUR DRINKING: NO
HOW OFTEN DO YOU HAVE 6 OR MORE DRINKS ON ONE OCCASION: NEVER
AUDIT-C TOTAL SCORE: 0
EVER HAD A DRINK FIRST THING IN THE MORNING TO STEADY YOUR NERVES TO GET RID OF A HANGOVER: NO
HOW OFTEN DO YOU HAVE A DRINK CONTAINING ALCOHOL: NEVER
AUDIT-C TOTAL SCORE: 0
HAVE PEOPLE ANNOYED YOU BY CRITICIZING YOUR DRINKING: NO
HAVE YOU EVER FELT YOU SHOULD CUT DOWN ON YOUR DRINKING: NO
HOW MANY STANDARD DRINKS CONTAINING ALCOHOL DO YOU HAVE ON A TYPICAL DAY: PATIENT DOES NOT DRINK
TOTAL SCORE: 0

## 2024-08-21 ASSESSMENT — PAIN - FUNCTIONAL ASSESSMENT: PAIN_FUNCTIONAL_ASSESSMENT: 0-10

## 2024-08-21 ASSESSMENT — PAIN SCALES - GENERAL
PAINLEVEL_OUTOF10: 0 - NO PAIN
PAINLEVEL_OUTOF10: 8
PAINLEVEL_OUTOF10: 0 - NO PAIN

## 2024-08-21 NOTE — PROGRESS NOTES
08/21/24 1129   Discharge Planning   Living Arrangements Children;Family members   Support Systems Children;Family members   Assistance Needed Alert and oriented x 3, Independent with ADL's, Doesn't drive([place]), Uses a wheeled walker   Type of Residence Private residence   Number of Stairs to Enter Residence 3   Number of Stairs Within Residence 0   Do you have animals or pets at home? No   Who is requesting discharge planning? Provider   Home or Post Acute Services None   Expected Discharge Disposition Home   Does the patient need discharge transport arranged? No   Financial Resource Strain   How hard is it for you to pay for the very basics like food, housing, medical care, and heating? Not hard   Housing Stability   In the last 12 months, was there a time when you were not able to pay the mortgage or rent on time? N   In the past 12 months, how many times have you moved where you were living? 0   At any time in the past 12 months, were you homeless or living in a shelter (including now)? N   Transportation Needs   In the past 12 months, has lack of transportation kept you from medical appointments or from getting medications? no   In the past 12 months, has lack of transportation kept you from meetings, work, or from getting things needed for daily living? No   Patient Choice   Provider Choice list and CMS website (https://medicare.gov/care-compare#search) for post-acute Quality and Resource Measure Data were provided and reviewed with: Patient;Family   Patient / Family choosing to utilize agency / facility established prior to hospitalization No        Scribe Attestation (For Scribes USE Only)... Scribe Attestation (For Scribes USE Only).../Attending Attestation (For Attendings USE Only)...

## 2024-08-21 NOTE — H&P
History Of Present Illness  Nadine Qureshi is a 84 y.o. female with a past medical history of coronary artery disease, persistent atrial fibrillation, pacemaker placement, CKD stage iv, hyperlipidemia, hypertension, normocytic anemia and diastolic heart failure who was brought to the hospital for persistent cough, muscle spasm and jerking movements.  Patient states that the cough is productive of mainly clear sputum sometimes yellow sputum.  The spasms were mainly in her legs but also had some in her arms for the past 2 days.  She denies confusion or loss of consciousness.  She has no fever or chills.  Her Lasix was recently increased to 80 mg twice daily.  She has lost about 13 pounds since her last admission.  She has leg swelling which is mild.  She denies fever or chills.  She denies chest pain, palpitation, nausea, vomiting, abdominal pain, melena, hematochezia, hematuria, headache, dizziness, neck pain or neck spasm.  History was obtained from the patient, her 2 daughters at the bedside as well as the chart.    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    Surgical History  She has a past surgical history that includes Coronary angioplasty with stent (06/07/2013); Back surgery (06/07/2013); Cardiac pacemaker placement (06/07/2013); and Cardiac catheterization (N/A, 3/27/2024).     Social History  She reports that she has never smoked. She has never used smokeless tobacco. She reports that she does not drink alcohol and does not use drugs.    Family History  No family history on file.      Allergies  Lipitor [atorvastatin], Statins-hmg-coa reductase inhibitors, and Zocor [simvastatin]    Medications  Scheduled medications  carvedilol, 12.5 mg, oral, Daily  cholecalciferol, 2,000 Units, oral, Daily  clopidogrel, 75 mg, oral, Daily  enoxaparin, 30 mg, subcutaneous, q24h  fluticasone, 2 spray, Each Nostril, Daily  furosemide, 80 mg, oral, BID  losartan, 100 mg, oral, Daily  potassium chloride CR, 20 mEq, oral, Daily      Continuous medications     PRN medications  PRN medications: acetaminophen, benzonatate, nitroglycerin, oxygen, polyethylene glycol, sodium chloride    Review of systems: 10-point review of systems is negative.     Physical Exam  Constitutional: alert and oriented x 3, awake, cooperative, no acute distress  Skin: warm and dry  Head/Neck: Normocephalic, atraumatic  Eyes: clear sclera  ENMT: mucous membranes moist  Cardio: Regular rate and rhythm  Resp: Diminished air entry  Gastrointestinal: Soft, nontender, nondistended  Musculoskeletal: ROM intact, no joint swelling  Extremities: 1+ edema bilateral lower extremities.  There is some chronic stasis dermatitis, with some redness in bilateral leg which is chronic as per the patient's daughters were in the room.  The legs were warm but the daughter states that they had warm compress on her legs because she was having spasm.  Neuro: lert and oriented x 3, sensation is intact.  Patient moves all limbs against resistance.  Psychological: Appropriate mood and behavior     Last Recorded Vitals  /72 (BP Location: Right arm, Patient Position: Lying)   Pulse 65   Temp 36.3 °C (97.3 °F) (Temporal)   Resp 20   Wt 79.2 kg (174 lb 9.7 oz)   SpO2 96%     Relevant Results  Results for orders placed or performed during the hospital encounter of 08/21/24 (from the past 24 hour(s))   CBC and Auto Differential   Result Value Ref Range    WBC 6.0 4.4 - 11.3 x10*3/uL    nRBC 0.0 0.0 - 0.0 /100 WBCs    RBC 3.85 (L) 4.00 - 5.20 x10*6/uL    Hemoglobin  10.5 (L) 12.0 - 16.0 g/dL    Hematocrit 33.3 (L) 36.0 - 46.0 %    MCV 87 80 - 100 fL    MCH 27.3 26.0 - 34.0 pg    MCHC 31.5 (L) 32.0 - 36.0 g/dL    RDW 17.0 (H) 11.5 - 14.5 %    Platelets 223 150 - 450 x10*3/uL    Neutrophils % 57.5 40.0 - 80.0 %    Immature Granulocytes %, Automated 0.3 0.0 - 0.9 %    Lymphocytes % 27.0 13.0 - 44.0 %    Monocytes % 11.1 2.0 - 10.0 %    Eosinophils % 3.4 0.0 - 6.0 %    Basophils % 0.7 0.0 - 2.0 %    Neutrophils Absolute 3.43 1.60 - 5.50 x10*3/uL    Immature Granulocytes Absolute, Automated 0.02 0.00 - 0.50 x10*3/uL    Lymphocytes Absolute 1.61 0.80 - 3.00 x10*3/uL    Monocytes Absolute 0.66 0.05 - 0.80 x10*3/uL    Eosinophils Absolute 0.20 0.00 - 0.40 x10*3/uL    Basophils Absolute 0.04 0.00 - 0.10 x10*3/uL   Lactate   Result Value Ref Range    Lactate 1.0 0.4 - 2.0 mmol/L   B-Type Natriuretic Peptide   Result Value Ref Range     (H) 0 - 99 pg/mL   Sars-CoV-2 PCR   Result Value Ref Range    Coronavirus 2019, PCR Not Detected Not Detected   Troponin I, High Sensitivity, Initial   Result Value Ref Range    Troponin I, High Sensitivity 31 (H) 0 - 13 ng/L   Magnesium   Result Value Ref Range    Magnesium 2.44 (H) 1.60 - 2.40 mg/dL   Comprehensive metabolic panel   Result Value Ref Range    Glucose 121 (H) 74 - 99 mg/dL    Sodium 138 136 - 145 mmol/L    Potassium 4.9 3.5 - 5.3 mmol/L    Chloride 101 98 - 107 mmol/L    Bicarbonate 26 21 - 32 mmol/L    Anion Gap 16 10 - 20 mmol/L    Urea Nitrogen 76 (H) 6 - 23 mg/dL    Creatinine 2.13 (H) 0.50 - 1.05 mg/dL    eGFR 23 (L) >60 mL/min/1.73m*2    Calcium 9.4 8.6 - 10.3 mg/dL    Albumin 3.8 3.4 - 5.0 g/dL    Alkaline Phosphatase 57 33 - 136 U/L    Total Protein 7.5 6.4 - 8.2 g/dL    AST 29 9 - 39 U/L    Bilirubin, Total 0.6 0.0 - 1.2 mg/dL    ALT 10 7 - 45 U/L   Troponin, High Sensitivity, 1 Hour   Result Value Ref Range    Troponin I, High Sensitivity 31 (H) 0 - 13 ng/L   Urinalysis with Reflex Culture and Microscopic   Result Value  Ref Range    Color, Urine Light-Yellow Light-Yellow, Yellow, Dark-Yellow    Appearance, Urine Clear Clear    Specific Gravity, Urine 1.012 1.005 - 1.035    pH, Urine 6.0 5.0, 5.5, 6.0, 6.5, 7.0, 7.5, 8.0    Protein, Urine NEGATIVE NEGATIVE, 10 (TRACE), 20 (TRACE) mg/dL    Glucose, Urine Normal Normal mg/dL    Blood, Urine NEGATIVE NEGATIVE    Ketones, Urine NEGATIVE NEGATIVE mg/dL    Bilirubin, Urine NEGATIVE NEGATIVE    Urobilinogen, Urine Normal Normal mg/dL    Nitrite, Urine NEGATIVE NEGATIVE    Leukocyte Esterase, Urine NEGATIVE NEGATIVE     CT chest wo IV contrast    Result Date: 8/21/2024  Interpreted By:  Kalia Ríos, STUDY: CT CHEST WO IV CONTRAST;  8/21/2024 2:56 am   INDICATION: Signs/Symptoms:sob, cough.   COMPARISON: Chest CT dated 06/06/2012.   ACCESSION NUMBER(S): [id number]   ORDERING CLINICIAN: DANNY DUQUE   TECHNIQUE: Helical data acquisition of the chest was obtained  without IV contrast material.  Images were reformatted in axial, coronal, and sagittal planes.   FINDINGS: LUNGS AND AIRWAYS: The trachea and central airways are patent. No endobronchial lesion.   There is interseptal thickening and mild mosaic attenuation of the upper lung fields suggestive of mild pulmonary venous congestion. Linear atelectasis in the right upper lobe. No focal airspace opacity. No pleural effusion or pneumothorax.   4 mm pulmonary nodule in the left lingula on axial image 129. Calcified granuloma in the right upper lobe.   MEDIASTINUM AND VIVIANA, LOWER NECK AND AXILLA: The visualized thyroid gland is within normal limits.   No evidence of thoracic lymphadenopathy by CT criteria. There are calcified mediastinal and right hilar lymph nodes similar to prior as well as calcified spleen granulomas reflecting sequela of granulomatous disease.   Esophagus appears within normal limits as seen.   HEART AND VESSELS: The thoracic aorta is of normal course and caliber. Mild aortic valve atherosclerotic  calcifications. There is left subclavian approach pacemaker.   Main pulmonary artery and its branches are normal in caliber.   Heavy coronary artery calcifications and stents. The study is not optimized for evaluation of coronary arteries.   Mild left-sided cardiomegaly with enlargement of the left atrium.   No evidence of pericardial effusion.   UPPER ABDOMEN: The visualized stomach is collapsed limiting assessment for wall thickening.   CHEST WALL AND OSSEOUS STRUCTURES: There are no suspicious osseous lesions. Multilevel degenerative changes are present       1.  Left-sided cardiomegaly with mild pulmonary venous congestion. No pleural effusion or acute chest pathology otherwise. Heavy coronary artery atherosclerotic calcifications and coronary artery stent.   MACRO: None   Signed by: Kalia Ríos 8/21/2024 3:28 AM Dictation workstation:   [id number]    XR chest 1 view    Result Date: 8/21/2024  Interpreted By:  Elpidio Lester, STUDY: XR CHEST 1 VIEW;  8/21/2024 1:18 am   INDICATION: Signs/Symptoms:cp.   COMPARISON: 08/08/2024   ACCESSION NUMBER(S): [id number]   ORDERING CLINICIAN: DANNY DUQUE   FINDINGS: Heart size is enlarged. Pacemaker seen. Features suggesting mild incipient edema. No pneumothorax or pleural effusion. No localizing infiltrate       1.  Mild cardiomegaly. Features suggesting slight volume overload. This could be chronic.       MACRO: None   Signed by: Elpidio Lester 8/21/2024 1:52 AM Dictation workstation:   [id number]    ECG 12 lead    Result Date: 8/9/2024  Ventricular-paced rhythm with occasional AV dual-paced complexes Abnormal ECG When compared with ECG of 02-AUG-2024 11:04, Vent. rate has decreased BY   3 BPM See ED provider note for full interpretation and clinical correlation Confirmed by Mali Bedoya ([id number]) on 8/9/2024 1:00:38 PM    XR chest 1 view    Result Date: 8/8/2024  Interpreted By:  Dior Kruger, STUDY: XR CHEST 1 VIEW;  8/8/2024 4:09 pm   INDICATION:  Signs/Symptoms:productive cough.   COMPARISON: 08/02/2024   ACCESSION NUMBER(S): [id number]   ORDERING CLINICIAN: JENNIFER HENDRIX   FINDINGS: Bipolar left subclavian pacer wires remain in place. Associated port obscures the lateral left lung base. Probable mild perihilar vascular congestion. No focal infiltrate, pleural effusion or pneumothorax as visualized. The cardiac silhouette remains enlarged. Scratch       Enlarged cardiac silhouette with mild vascular congestion.   MACRO: None.   Signed by: Dior Kruger 8/8/2024 4:18 PM Dictation workstation:   [id number]    Transthoracic Echo (TTE) Complete    Result Date: 8/2/2024   [facility], [address] [place] [address]               Tel [phone] and Fax [phone] TRANSTHORACIC ECHOCARDIOGRAM REPORT  Patient Name:      LETICIA BROWNING      Reading Physician:    [id number] Rikki Briones MD Study Date:        8/2/2024             Ordering Provider:    [id number] KEY DARLING MRN/PID:           [id number]             Fellow: Accession#:        [id number]         Nurse:                Melissa Gay RN Date of Birth/Age: 1940 / 84 years Sonographer:          Wendy Guevara RDCS Gender:            F                    Additional Staff: Height:            152.40 cm            Admit Date:           8/2/2024 Weight:            84.82 kg             Admission Status:     Inpatient -                                                               Routine BSA / BMI:         1.81 m2 / 36.52      Encounter#:           [id number]                    kg/m2 Blood Pressure:    141/83 mmHg          Department Location:  [facility] Non                                                               Invasive Study Type:    TRANSTHORACIC ECHO (TTE) COMPLETE Diagnosis/ICD:  Longstanding persistent AFib-I48.11; Pulmonary hypertension,                unspecified-I27.20; Acute on chronic diastolic (congestive) heart                failure (CHF)-I50.33 Indication:    Atrial Fibrillation, Congestive Heart Failure, PHTN CPT Code:      Echo Complete w Full Doppler-[id number] Patient History: Pacer/Defib:       Permanent pacemaker Pertinent History: HTN, Hyperlipidemia, CAD, A-Fib and SSS. chronic kidney                    disease. Study Detail: The following Echo studies were performed: 2D, M-Mode, Doppler and               color flow. Technically challenging study due to prominent lung               artifact and poor acoustic windows. Definity used as a contrast               agent for endocardial border definition. Total contrast used for               this procedure was 2.0 mL via IV push. The patient was awake.  PHYSICIAN INTERPRETATION: Left Ventricle: The left ventricular systolic function is normal, with a visually estimated ejection fraction of 55-60%. Wall motion is abnormal. The left ventricular cavity size is normal. There is mild concentric left ventricular hypertrophy. Spectral Doppler shows a restrictive pattern of left ventricular diastolic filling. Left Atrium: The left atrium is moderately dilated. Right Ventricle: The right ventricle is mildly enlarged. There is mildly reduced right ventricular systolic function. Right Atrium: The right atrium is moderately dilated. Aortic Valve: The aortic valve is trileaflet. There is mild to moderate aortic valve cusp calcification. The aortic valve dimensionless index is 0.68. There is no evidence of aortic valve regurgitation. The peak instantaneous gradient of the aortic valve is 4.8 mmHg. The mean gradient of the aortic valve is 3.0 mmHg. Mitral Valve: The mitral valve is normal in structure. There is mild to moderate mitral valve regurgitation. Tricuspid Valve: The tricuspid valve is structurally normal. There is moderate tricuspid  regurgitation. Pulmonic Valve: The pulmonic valve is structurally normal. There is physiologic pulmonic valve regurgitation. Pericardium: There is no pericardial effusion noted. Aorta: The aortic root is normal. Pulmonary Artery: The tricuspid regurgitant velocity is 3.73 m/s, and with an estimated right atrial pressure of 3 mmHg, the estimated pulmonary artery pressure is moderately elevated with the RVSP at 58.7 mmHg. Pulmonary Veins: There is blunted systolic flow in the pulmonary veins. Systemic Veins: The inferior vena cava appears mildly dilated. There is poor inspiratory collapse of the IVC (less than 50%), consistent with elevated right atrial pressure.  CONCLUSIONS:  1. The left ventricular systolic function is normal, with a visually estimated ejection fraction of 55-60%.  2. Spectral Doppler shows a restrictive pattern of left ventricular diastolic filling.  3. Mildly enlarged right ventricle.  4. There is mildly reduced right ventricular systolic function.  5. The left atrium is moderately dilated.  6. The right atrium is moderately dilated.  7. Mild to moderate mitral valve regurgitation.  8. Moderate tricuspid regurgitation visualized.  9. Moderately elevated pulmonary artery pressure. CVP is elevated. QUANTITATIVE DATA SUMMARY: 2D MEASUREMENTS:                           Normal Ranges: Ao Root d:     3.10 cm    (2.0-3.7cm) IVSd:          1.23 cm    (0.6-1.1cm) LVPWd:         1.25 cm    (0.6-1.1cm) LVIDd:         4.23 cm    (3.9-5.9cm) LVIDs:         3.51 cm LV Mass Index: 104.2 g/m2 LV % FS        17.0 % LA VOLUME:                               Normal Ranges: LA Vol A4C:        88.5 ml    (22+/-6mL/m2) LA Vol A2C:        80.0 ml LA Vol BP:         85.4 ml LA Vol Index A4C:  48.8ml/m2 LA Vol Index A2C:  44.1 ml/m2 LA Vol Index BP:   47.1 ml/m2 LA Area A4C:       25.3 cm2 LA Area A2C:       23.7 cm2 LA Major Axis A4C: 6.2 cm LA Major Axis A2C: 6.0 cm LA Volume Index:   44.2 ml/m2 LA Vol A4C:        81.9  ml LA Vol A2C:        76.6 ml RA VOLUME BY A/L METHOD:                       Normal Ranges: RA Area A4C: 26.5 cm2 M-MODE MEASUREMENTS:                  Normal Ranges: Ao Root: 3.10 cm (2.0-3.7cm) AoV Exc: 2.00 cm (1.5-2.5cm) LAs:     4.30 cm (2.7-4.0cm) AORTA MEASUREMENTS:                      Normal Ranges: AoV Exc:     2.00 cm (1.5-2.5cm) Ao Sinus, d: 3.10 cm (2.1-3.5cm) Asc Ao, d:   3.00 cm (2.1-3.4cm) LV SYSTOLIC FUNCTION BY 2D PLANIMETRY (MOD):                      Normal Ranges: EF-A4C View:    41 % (>=55%) EF-A2C View:    36 % EF-Biplane:     39 % EF-Visual:      58 % LV EF Reported: 58 % LV DIASTOLIC FUNCTION:                             Normal Ranges: MV Peak E:      0.97 m/s    (0.7-1.2 m/s) MV Peak A:      0.24 m/s    (0.42-0.7 m/s) E/A Ratio:      4.05        (1.0-2.2) MV e'           0.118 m/s   (>8.0) MV lateral e'   0.15 m/s MV medial e'    0.09 m/s MV A Dur:       151.00 msec E/e' Ratio:     8.24        (<8.0) PulmV Sys Víctor:  24.00 cm/s PulmV Zapata Víctor: 50.10 cm/s PulmV S/D Víctor:  0.50 MITRAL VALVE:                 Normal Ranges: MV DT: 130 msec (150-240msec) MITRAL INSUFFICIENCY:                           Normal Ranges: PISA Radius:  0.4 cm MR VTI:       189.00 cm MR Vmax:      524.00 cm/s MR Alias Víctor: 35.1 cm/s MR Volume:    12.73 ml MR Flow Rt:   35.29 ml/s MR EROA:      0.07 cm2 AORTIC VALVE:                                   Normal Ranges: AoV Vmax:                1.10 m/s (<=1.7m/s) AoV Peak P.8 mmHg (<20mmHg) AoV Mean PG:             3.0 mmHg (1.7-11.5mmHg) LVOT Max Víctor:            0.67 m/s (<=1.1m/s) AoV VTI:                 22.50 cm (18-25cm) LVOT VTI:                15.20 cm LVOT Diameter:           2.00 cm  (1.8-2.4cm) AoV Area, VTI:           2.12 cm2 (2.5-5.5cm2) AoV Area,Vmax:           1.92 cm2 (2.5-4.5cm2) AoV Dimensionless Index: 0.68  RIGHT VENTRICLE: TAPSE: 15.7 mm RV s'  0.08 m/s TRICUSPID VALVE/RVSP:                             Normal Ranges: Peak TR Velocity: 3.73  m/s RV Syst Pressure: 58.7 mmHg (< 30mmHg) IVC Diam:         2.32 cm PULMONIC VALVE:                      Normal Ranges: PV Max Víctor: 0.7 m/s  (0.6-0.9m/s) PV Max P.9 mmHg Pulmonary Veins: PulmV Zapata Víctor: 50.10 cm/s PulmV S/D Víctor:  0.50 PulmV Sys Víctor:  24.00 cm/s  [id number] Rikki Briones MD Electronically signed on 2024 at 7:15:29 PM  ** Final **     ECG 12 lead    Result Date: 2024  Ventricular-paced rhythm Abnormal ECG When compared with ECG of 2014 23:09, Electronic ventricular pacemaker has replaced Electronic atrial pacemaker See ED provider note for full interpretation and clinical correlation Confirmed by Shreya Mchugh ([id number]) on 2024 12:36:55 PM    XR chest 1 view    Result Date: 2024  Interpreted By:  Yrn Kim, STUDY: XR CHEST 1 VIEW;  2024 11:20 am   INDICATION: Signs/Symptoms:sob.   COMPARISON: CT chest chest radiograph 2012   ACCESSION NUMBER(S): [id number]   ORDERING CLINICIAN: DEL WELLS   FINDINGS: AP radiograph of the chest was provided.   DEVICES: Stable left-sided ICD.   CARDIOMEDIASTINAL SILHOUETTE: Cardiomediastinal silhouette is stable in size and configuration.Atherosclerotic calcifications of the aortic arch.   LUNGS: No focal consolidation. No pneumothorax. No pleural effusion. Mild bibasilar atelectasis.   BONES: No acute osseous changes.       1.  No evidence of acute cardiopulmonary process.     Signed by: Yrn Kim 2024 11:39 AM Dictation workstation:   [id number]       Assessment/Plan   Assessment & Plan  Cough    Acute bronchitis      Chronic diastolic heart failure (Multi)      Muscle spasm    Involuntary jerky movements    Nadine Qureshi is a 84 y.o. female with a past medical history of coronary artery disease, persistent atrial fibrillation, sick sinus syndrome status post pacemaker placement, severe pulmonary hypertension, CKD stage iv, hyperlipidemia, hypertension, normocytic anemia and diastolic heart  failure who was brought to the hospital for persistent cough, muscle spasm and jerking movements.  Patient states that the cough is productive of mainly clear sputum sometimes yellow sputum.      Muscle spasm/ambulatory jerking movement  -Patient received Norflex and Ativan in the ED which improved her symptoms  -Etiology is unclear.  There is no significant electrolyte imbalance .  -Patient has CKD IV which is unchanged from the last admission  -Unclear if the poor renal function is contributing to this spasm and myoclonic jerks  -There is no clear signs of infection.  She has no fever leukocytosis or left shift.  -Will do a CT scan of the head  -Follow-up urinalysis with reflex urine culture  -Follow-up ammonia level  -Give Ativan and Norflex as needed  -If myoclonic jerks persist, patient may need to be transferred to see neurologist.  -Discussed with the patient and her daughters at the bedside  -Will follow-up with PT/OT    Probable acute bronchitis  -Likely viral  -COVID is negative  -Tessalon Perles  -Bronchodilators as needed  -Will continue supportive care    Chronic diastolic heart failure  -2D echo done on 8/2/2024 showed EF of 55 to 60%, diastolic dysfunction, mildly enlarged right ventricle, moderately dilated left and right atrium, mild to moderate MR and TR.  Moderately elevated pulmonary artery pressure  -Patient denies shortness of breath  -She is currently on room air  -Lasix was recently increased to 80 mg twice daily  -Will continue 80 mg twice daily for now  -Losartan has been on hold.    Atrial fibrillation  -Not on blood thinner  -Continue carvedilol    DVT prophylaxis  -Subcu heparin    CODE STATUS: DNR/DNI.  Discussed with patient as well as her 2 daughters at the bedside       Conor Loyola MD

## 2024-08-21 NOTE — CONSULTS
Inpatient consult to Cardiology  Consult performed by: ODALYS Sánchez-CNP  Consult ordered by: Nelli Rueda PA-C  Reason for consult: CHF          History Of Present Illness  Nadine Qureshi is a 84 y.o. female presenting with tremors and shaking all over. She states that the shaking lasted for about 2 days and didn't resolve until she was in the ER and they gave her ativan and Norflex. She denies any chest pain, shortness of breath, palpitations, or dizziness. She does report a 13lb weight loss in the last 2 weeks after her diuretics were adjusted. She does report a moist cough occasionally productive of clear sputum.     Lab work in the ER showed glucose 121, sodium 138, potassium 4.9, BUN/Cr 76/2.13, magnesium 2.44, , troponin 31 x2, WBC 6.0, H&H 10.5/33.3, CXR showed mild cardiomegaly, CT chest showed Left-sided cardiomegaly with mild pulmonary venous congestion. No pleural effusion or acute chest pathology otherwise. Heavy coronary  artery atherosclerotic calcifications and coronary artery stent.    EKG ventricular paced.       Past Medical History  Past Medical History:   Diagnosis Date    Cellulitis of unspecified part of limb 06/15/2021    Cellulitis, leg    Chronic kidney disease, stage 3 unspecified (Multi) 07/31/2019    CKD (chronic kidney disease), stage III    Essential (primary) hypertension     Benign essential hypertension    Familial hypercholesterolemia 06/07/2013    Essential familial hypercholesterolemia    Other conditions influencing health status 06/07/2013    Coronary Artery Disease    Personal history of other diseases of the respiratory system 05/03/2018    History of acute sinusitis    Personal history of other mental and behavioral disorders 05/03/2018    History of depression   CAD status post PCI/JOSE to PDA and ostial RCA, PVD, sick sinus syndrome status post Medtronic pacemaker, hyperlipidemia, depression, anemia, pulmonary hypertension, obesity, hypertension,  permanent atrial fibrillation    Surgical History  Past Surgical History:   Procedure Laterality Date    BACK SURGERY  06/07/2013    Lower Back Surgery    CARDIAC CATHETERIZATION N/A 3/27/2024    Procedure: Left & Right Heart Cath w Angiography & LV;  Surgeon: Rikki Birones MD;  Location: [facility] Cardiac Cath Lab;  Service: Cardiovascular;  Laterality: N/A;  3/27/24--900 am for R+LHC 71716 for CAD with angina I25.119 and PAH I27.20  ACAP; [employer], SP    CARDIAC PACEMAKER PLACEMENT  06/07/2013    Pacemaker Placement    CORONARY ANGIOPLASTY WITH STENT PLACEMENT  06/07/2013    Cath Stent Placement        Social History  She reports that she has never smoked. She has never used smokeless tobacco. She reports that she does not drink alcohol and does not use drugs.    Family History  No family history on file.     Allergies  Lipitor [atorvastatin], Statins-hmg-coa reductase inhibitors, and Zocor [simvastatin]    Review of Systems  Review of Systems   Constitutional:  Negative for chills and fever.   Respiratory:  Positive for cough. Negative for shortness of breath.    Cardiovascular:  Negative for chest pain, palpitations and leg swelling.   Gastrointestinal:  Negative for abdominal distention and abdominal pain.   Neurological:  Positive for tremors. Negative for dizziness.          Physical Exam  Constitutional: Well developed, awake/alert/oriented x3, no distress, alert and cooperative  Eyes: PERRL, EOMI, clear sclera  ENMT: mucous membranes moist, no apparent injury, no lesions seen  Head/Neck: Neck supple, no apparent injury, thyroid without mass or tenderness, No JVD, trachea midline, no bruits  Respiratory/Thorax: Patent airways, CTAB, normal breath sounds with good chest expansion, thorax symmetric  Cardiovascular: Regular, rate and rhythm, no murmurs, 2+ equal pulses of the extremities, normal S 1and S 2  Gastrointestinal: Nondistended, soft, non-tender, no rebound tenderness or guarding, no masses  "palpable, no organomegaly, +BS, no bruits  Musculoskeletal: ROM intact, no joint swelling, normal strength  Extremities: normal extremities, no cyanosis edema, contusions or wounds, no clubbing  Neurological: alert and oriented x3, intact senses, motor, response and reflexes, normal strength  Lymphatic: No significant lymphadenopathy  Psychological: Appropriate mood and behavior  Skin: Warm and dry, no lesions, no rashes       Last Recorded Vitals  Blood pressure 97/64, pulse 77, temperature 36.2 °C (97.2 °F), temperature source Temporal, resp. rate 17, height 1.626 m (5' 4.02\"), weight 79.2 kg (174 lb 9.7 oz), SpO2 95%.    Relevant Results    Scheduled medications  albuterol, 2.5 mg, nebulization, TID  amoxicillin-pot clavulanate, 1 tablet, oral, q12h ADIS  benzonatate, 200 mg, oral, TID  carvedilol, 12.5 mg, oral, Daily  cholecalciferol, 2,000 Units, oral, Daily  clopidogrel, 75 mg, oral, Daily  fluticasone, 2 spray, Each Nostril, Daily  furosemide, 80 mg, oral, BID  heparin (porcine), 5,000 Units, subcutaneous, q8h  losartan, 100 mg, oral, Daily  [Held by provider] potassium chloride CR, 20 mEq, oral, Daily      Continuous medications     PRN medications  PRN medications: acetaminophen, LORazepam, nitroglycerin, orphenadrine, oxygen, polyethylene glycol, sodium chloride    Results for orders placed or performed during the hospital encounter of 08/21/24 (from the past 24 hour(s))   CBC and Auto Differential   Result Value Ref Range    WBC 6.0 4.4 - 11.3 x10*3/uL    nRBC 0.0 0.0 - 0.0 /100 WBCs    RBC 3.85 (L) 4.00 - 5.20 x10*6/uL    Hemoglobin 10.5 (L) 12.0 - 16.0 g/dL    Hematocrit 33.3 (L) 36.0 - 46.0 %    MCV 87 80 - 100 fL    MCH 27.3 26.0 - 34.0 pg    MCHC 31.5 (L) 32.0 - 36.0 g/dL    RDW 17.0 (H) 11.5 - 14.5 %    Platelets 223 150 - 450 x10*3/uL    Neutrophils % 57.5 40.0 - 80.0 %    Immature Granulocytes %, Automated 0.3 0.0 - 0.9 %    Lymphocytes % 27.0 13.0 - 44.0 %    Monocytes % 11.1 2.0 - 10.0 %    " Eosinophils % 3.4 0.0 - 6.0 %    Basophils % 0.7 0.0 - 2.0 %    Neutrophils Absolute 3.43 1.60 - 5.50 x10*3/uL    Immature Granulocytes Absolute, Automated 0.02 0.00 - 0.50 x10*3/uL    Lymphocytes Absolute 1.61 0.80 - 3.00 x10*3/uL    Monocytes Absolute 0.66 0.05 - 0.80 x10*3/uL    Eosinophils Absolute 0.20 0.00 - 0.40 x10*3/uL    Basophils Absolute 0.04 0.00 - 0.10 x10*3/uL   Lactate   Result Value Ref Range    Lactate 1.0 0.4 - 2.0 mmol/L   B-Type Natriuretic Peptide   Result Value Ref Range     (H) 0 - 99 pg/mL   Sars-CoV-2 PCR   Result Value Ref Range    Coronavirus 2019, PCR Not Detected Not Detected   Troponin I, High Sensitivity, Initial   Result Value Ref Range    Troponin I, High Sensitivity 31 (H) 0 - 13 ng/L   Magnesium   Result Value Ref Range    Magnesium 2.44 (H) 1.60 - 2.40 mg/dL   Comprehensive metabolic panel   Result Value Ref Range    Glucose 121 (H) 74 - 99 mg/dL    Sodium 138 136 - 145 mmol/L    Potassium 4.9 3.5 - 5.3 mmol/L    Chloride 101 98 - 107 mmol/L    Bicarbonate 26 21 - 32 mmol/L    Anion Gap 16 10 - 20 mmol/L    Urea Nitrogen 76 (H) 6 - 23 mg/dL    Creatinine 2.13 (H) 0.50 - 1.05 mg/dL    eGFR 23 (L) >60 mL/min/1.73m*2    Calcium 9.4 8.6 - 10.3 mg/dL    Albumin 3.8 3.4 - 5.0 g/dL    Alkaline Phosphatase 57 33 - 136 U/L    Total Protein 7.5 6.4 - 8.2 g/dL    AST 29 9 - 39 U/L    Bilirubin, Total 0.6 0.0 - 1.2 mg/dL    ALT 10 7 - 45 U/L   Troponin, High Sensitivity, 1 Hour   Result Value Ref Range    Troponin I, High Sensitivity 31 (H) 0 - 13 ng/L   ECG 12 lead   Result Value Ref Range    Ventricular Rate 70 BPM    QRS Duration 66 ms    QT Interval 382 ms    QTC Calculation(Bazett) 412 ms    R Axis 77 degrees    T Axis -31 degrees    QRS Count 11 beats    Q Onset 229 ms    T Offset 420 ms    QTC Fredericia 402 ms   Urinalysis with Reflex Culture and Microscopic   Result Value Ref Range    Color, Urine Light-Yellow Light-Yellow, Yellow, Dark-Yellow    Appearance, Urine Clear Clear     Specific Gravity, Urine 1.012 1.005 - 1.035    pH, Urine 6.0 5.0, 5.5, 6.0, 6.5, 7.0, 7.5, 8.0    Protein, Urine NEGATIVE NEGATIVE, 10 (TRACE), 20 (TRACE) mg/dL    Glucose, Urine Normal Normal mg/dL    Blood, Urine NEGATIVE NEGATIVE    Ketones, Urine NEGATIVE NEGATIVE mg/dL    Bilirubin, Urine NEGATIVE NEGATIVE    Urobilinogen, Urine Normal Normal mg/dL    Nitrite, Urine NEGATIVE NEGATIVE    Leukocyte Esterase, Urine NEGATIVE NEGATIVE   Ammonia   Result Value Ref Range    Ammonia 13 (L) 16 - 53 umol/L   Urinalysis with Reflex Microscopic   Result Value Ref Range    Color, Urine Colorless (N) Light-Yellow, Yellow, Dark-Yellow    Appearance, Urine Clear Clear    Specific Gravity, Urine 1.011 1.005 - 1.035    pH, Urine 6.0 5.0, 5.5, 6.0, 6.5, 7.0, 7.5, 8.0    Protein, Urine NEGATIVE NEGATIVE, 10 (TRACE), 20 (TRACE) mg/dL    Glucose, Urine Normal Normal mg/dL    Blood, Urine NEGATIVE NEGATIVE    Ketones, Urine NEGATIVE NEGATIVE mg/dL    Bilirubin, Urine NEGATIVE NEGATIVE    Urobilinogen, Urine Normal Normal mg/dL    Nitrite, Urine NEGATIVE NEGATIVE    Leukocyte Esterase, Urine NEGATIVE NEGATIVE       CT head wo IV contrast   Final Result   1. No acute intracranial pathology.   2. Probable combination acute and chronic right frontal, ethmoid and   sphenoid sinusitis        MACRO:   None        Signed by: Silvia Godoy 8/21/2024 8:11 AM   Dictation workstation:   [id number]      CT chest wo IV contrast   Final Result   1.  Left-sided cardiomegaly with mild pulmonary venous congestion. No   pleural effusion or acute chest pathology otherwise. Heavy coronary   artery atherosclerotic calcifications and coronary artery stent.        MACRO:   None        Signed by: Kalia Ríos 8/21/2024 3:28 AM   Dictation workstation:   [id number]      XR chest 1 view   Final Result   1.  Mild cardiomegaly. Features suggesting slight volume overload.   This could be chronic.                  MACRO:   None        Signed by: Elpidio  Trevon 8/21/2024 1:52 AM   Dictation workstation:   [id number]          Transthoracic Echo (TTE) Complete    Result Date: 8/2/2024   [facility], [address] [place] [address]               Tel [phone] and Fax [phone] TRANSTHORACIC ECHOCARDIOGRAM REPORT  Patient Name:      LETICIA BROWNING      Reading Physician:    [id number] Rikki Briones MD Study Date:        8/2/2024             Ordering Provider:    [id number] KEY DARLING MRN/PID:           [id number]             Fellow: Accession#:        [id number]         Nurse:                Melissa Gay RN Date of Birth/Age: 1940 / 84 years Sonographer:          Wendy Guevara RDCS Gender:            F                    Additional Staff: Height:            152.40 cm            Admit Date:           8/2/2024 Weight:            84.82 kg             Admission Status:     Inpatient -                                                               Routine BSA / BMI:         1.81 m2 / 36.52      Encounter#:           [id number]                    kg/m2 Blood Pressure:    141/83 mmHg          Department Location:  [facility]                                                               Invasive Study Type:    TRANSTHORACIC ECHO (TTE) COMPLETE Diagnosis/ICD: Longstanding persistent AFib-I48.11; Pulmonary hypertension,                unspecified-I27.20; Acute on chronic diastolic (congestive) heart                failure (CHF)-I50.33 Indication:    Atrial Fibrillation, Congestive Heart Failure, PHTN CPT Code:      Echo Complete w Full Doppler-21860 Patient History: Pacer/Defib:       Permanent pacemaker Pertinent History: HTN, Hyperlipidemia, CAD, A-Fib and SSS. chronic kidney                    disease. Study Detail: The following Echo studies were  performed: 2D, M-Mode, Doppler and               color flow. Technically challenging study due to prominent lung               artifact and poor acoustic windows. Definity used as a contrast               agent for endocardial border definition. Total contrast used for               this procedure was 2.0 mL via IV push. The patient was awake.  PHYSICIAN INTERPRETATION: Left Ventricle: The left ventricular systolic function is normal, with a visually estimated ejection fraction of 55-60%. Wall motion is abnormal. The left ventricular cavity size is normal. There is mild concentric left ventricular hypertrophy. Spectral Doppler shows a restrictive pattern of left ventricular diastolic filling. Left Atrium: The left atrium is moderately dilated. Right Ventricle: The right ventricle is mildly enlarged. There is mildly reduced right ventricular systolic function. Right Atrium: The right atrium is moderately dilated. Aortic Valve: The aortic valve is trileaflet. There is mild to moderate aortic valve cusp calcification. The aortic valve dimensionless index is 0.68. There is no evidence of aortic valve regurgitation. The peak instantaneous gradient of the aortic valve is 4.8 mmHg. The mean gradient of the aortic valve is 3.0 mmHg. Mitral Valve: The mitral valve is normal in structure. There is mild to moderate mitral valve regurgitation. Tricuspid Valve: The tricuspid valve is structurally normal. There is moderate tricuspid regurgitation. Pulmonic Valve: The pulmonic valve is structurally normal. There is physiologic pulmonic valve regurgitation. Pericardium: There is no pericardial effusion noted. Aorta: The aortic root is normal. Pulmonary Artery: The tricuspid regurgitant velocity is 3.73 m/s, and with an estimated right atrial pressure of 3 mmHg, the estimated pulmonary artery pressure is moderately elevated with the RVSP at 58.7 mmHg. Pulmonary Veins: There is blunted systolic flow in the pulmonary veins. Systemic  Veins: The inferior vena cava appears mildly dilated. There is poor inspiratory collapse of the IVC (less than 50%), consistent with elevated right atrial pressure.  CONCLUSIONS:  1. The left ventricular systolic function is normal, with a visually estimated ejection fraction of 55-60%.  2. Spectral Doppler shows a restrictive pattern of left ventricular diastolic filling.  3. Mildly enlarged right ventricle.  4. There is mildly reduced right ventricular systolic function.  5. The left atrium is moderately dilated.  6. The right atrium is moderately dilated.  7. Mild to moderate mitral valve regurgitation.  8. Moderate tricuspid regurgitation visualized.  9. Moderately elevated pulmonary artery pressure. CVP is elevated. QUANTITATIVE DATA SUMMARY: 2D MEASUREMENTS:                           Normal Ranges: Ao Root d:     3.10 cm    (2.0-3.7cm) IVSd:          1.23 cm    (0.6-1.1cm) LVPWd:         1.25 cm    (0.6-1.1cm) LVIDd:         4.23 cm    (3.9-5.9cm) LVIDs:         3.51 cm LV Mass Index: 104.2 g/m2 LV % FS        17.0 % LA VOLUME:                               Normal Ranges: LA Vol A4C:        88.5 ml    (22+/-6mL/m2) LA Vol A2C:        80.0 ml LA Vol BP:         85.4 ml LA Vol Index A4C:  48.8ml/m2 LA Vol Index A2C:  44.1 ml/m2 LA Vol Index BP:   47.1 ml/m2 LA Area A4C:       25.3 cm2 LA Area A2C:       23.7 cm2 LA Major Axis A4C: 6.2 cm LA Major Axis A2C: 6.0 cm LA Volume Index:   44.2 ml/m2 LA Vol A4C:        81.9 ml LA Vol A2C:        76.6 ml RA VOLUME BY A/L METHOD:                       Normal Ranges: RA Area A4C: 26.5 cm2 M-MODE MEASUREMENTS:                  Normal Ranges: Ao Root: 3.10 cm (2.0-3.7cm) AoV Exc: 2.00 cm (1.5-2.5cm) LAs:     4.30 cm (2.7-4.0cm) AORTA MEASUREMENTS:                      Normal Ranges: AoV Exc:     2.00 cm (1.5-2.5cm) Ao Sinus, d: 3.10 cm (2.1-3.5cm) Asc Ao, d:   3.00 cm (2.1-3.4cm) LV SYSTOLIC FUNCTION BY 2D PLANIMETRY (MOD):                      Normal Ranges: EF-A4C View:     41 % (>=55%) EF-A2C View:    36 % EF-Biplane:     39 % EF-Visual:      58 % LV EF Reported: 58 % LV DIASTOLIC FUNCTION:                             Normal Ranges: MV Peak E:      0.97 m/s    (0.7-1.2 m/s) MV Peak A:      0.24 m/s    (0.42-0.7 m/s) E/A Ratio:      4.05        (1.0-2.2) MV e'           0.118 m/s   (>8.0) MV lateral e'   0.15 m/s MV medial e'    0.09 m/s MV A Dur:       151.00 msec E/e' Ratio:     8.24        (<8.0) PulmV Sys Víctor:  24.00 cm/s PulmV Zapata Víctor: 50.10 cm/s PulmV S/D Víctor:  0.50 MITRAL VALVE:                 Normal Ranges: MV DT: 130 msec (150-240msec) MITRAL INSUFFICIENCY:                           Normal Ranges: PISA Radius:  0.4 cm MR VTI:       189.00 cm MR Vmax:      524.00 cm/s MR Alias Víctor: 35.1 cm/s MR Volume:    12.73 ml MR Flow Rt:   35.29 ml/s MR EROA:      0.07 cm2 AORTIC VALVE:                                   Normal Ranges: AoV Vmax:                1.10 m/s (<=1.7m/s) AoV Peak P.8 mmHg (<20mmHg) AoV Mean PG:             3.0 mmHg (1.7-11.5mmHg) LVOT Max Víctor:            0.67 m/s (<=1.1m/s) AoV VTI:                 22.50 cm (18-25cm) LVOT VTI:                15.20 cm LVOT Diameter:           2.00 cm  (1.8-2.4cm) AoV Area, VTI:           2.12 cm2 (2.5-5.5cm2) AoV Area,Vmax:           1.92 cm2 (2.5-4.5cm2) AoV Dimensionless Index: 0.68  RIGHT VENTRICLE: TAPSE: 15.7 mm RV s'  0.08 m/s TRICUSPID VALVE/RVSP:                             Normal Ranges: Peak TR Velocity: 3.73 m/s RV Syst Pressure: 58.7 mmHg (< 30mmHg) IVC Diam:         2.32 cm PULMONIC VALVE:                      Normal Ranges: PV Max Víctor: 0.7 m/s  (0.6-0.9m/s) PV Max P.9 mmHg Pulmonary Veins: PulmV Zapata Víctor: 50.10 cm/s PulmV S/D Víctor:  0.50 PulmV Sys Víctor:  24.00 cm/s  [id number] Rikki Briones MD Electronically signed on 2024 at 7:15:29 PM  ** Final **         Assessment/Plan   2024 Echocardiogram  CONCLUSIONS:   1. The left ventricular systolic function is normal, with a visually estimated  ejection fraction of 55-60%.   2. Spectral Doppler shows a restrictive pattern of left ventricular diastolic filling.   3. Mildly enlarged right ventricle.   4. There is mildly reduced right ventricular systolic function.   5. The left atrium is moderately dilated.   6. The right atrium is moderately dilated.   7. Mild to moderate mitral valve regurgitation.   8. Moderate tricuspid regurgitation visualized.   9. Moderately elevated pulmonary artery pressure. CVP is elevated.     #Tremors  -Afebrile  -CT head showed sinusitis  -UA negative  -Ammonia 13  -Resolved with ativan and Norflex  -neuro follow up as outpt    #Chronic diastolic congestive heart failure  -  -CXR showed possible chronic changes  -I reviewed the Echocardiogram from above  -Strict I & Os  -Daily weights    --Has lost 13lbs since admitted 2 weeks ago  -2gm na diet  -1500mL fluid restriction   - Troponin 31, 31  - Does not appear to be in acute CHF     #Elevated Troponin - Non MI  #Coronary artery disease  -Troponin 31, 31  -I reviewed the EKG, ventricular paced rhythm, compared to previous EKG in office, no changes noted.   -CXR negative  -I reviewed the echocardiogram from above  -[facility] reviewed, triple vessel disease   - Continue medical management of carvedilol and clopidogrel  -For refractory angina despite medical management consider interventional cardiology consult to consider PCI/ feasibility of LAD PCI as vessel caliber is small  - Is unable to tolerate statins, can discuss starting PCSK9 inhibitors outpatient     # Moderate Pulmonary Hypertension  - Echocardiogram as above  - right heart cath reviewed as above, mPAP 45-50 mmHg     # Persistent atrial fibrillation  - I reviewed the 12 lead ECG, ventricular paced  - Not on any current anticoagulant. She reports that she takes nattoknise at home as a blood thinner. She was previously on coumadin.   - Referred to EP outpatient for RFA/PVI  - Carvedilol 12.5 mg  -  echocardiogram reviewed, plan to repeat to monitor pulmonary hypertension  - TSH reviewed  from March - wnl     # Hypertension  - continue meds  - blood pressure stable     # Acute on chronic kidney disease  - creatinine 2.13, improved slightly from 2 weeks ago  - Cont meds and monitor kidney function closely     # Sick sinus syndrome s/p Medtronic pacemaker  - Personally interrogated, normal functioning, persistent atrial fibrillation, no other arrhythmias     Marisa Medellin, APRN-CNP

## 2024-08-21 NOTE — PROGRESS NOTES
Pharmacy Medication History Review    Nadine Qureshi is a 84 y.o. female admitted for Involuntary jerky movements. Pharmacy reviewed the patient's cccei-ad-orvpfxrsm medications and allergies for accuracy.    The list below reflectives the updated PTA list. Please review each medication in order reconciliation for additional clarification and justification.  Prior to Admission Medications   Prescriptions Last Dose Informant Patient Reported? Taking?   UNABLE TO FIND Past Week Family Member Yes Yes   Sig: Take 2 capsules by mouth once daily. Med Name: mood  otc   UNABLE TO FIND Past Week Family Member Yes Yes   Sig: Take 1 capsule by mouth. Med Name: formula #717 (natural supplement, antibiotic)   VALERIAN ROOT ORAL Past Week Family Member Yes Yes   Sig: Take 1 tablet by mouth once daily. And prn muscle pains   benzonatate (Tessalon) 200 mg capsule 8/20/2024 at HS Family Member No PRN   Sig: Take 1 capsule (200 mg) by mouth 3 times a day as needed for cough. Do not crush or chew.   carvedilol (Coreg) 12.5 mg tablet 8/20/2024 at AM Family Member Yes Yes   Sig: Take 1 tablet (12.5 mg) by mouth once daily.   cholecalciferol (Vitamin D-3) 50 mcg (2,000 unit) capsule 8/20/2024 Family Member Yes Yes   Sig: Take 1 capsule (50 mcg) by mouth once daily.   clopidogrel (Plavix) 75 mg tablet 8/20/2024 Family Member Yes Yes   Sig: Take 1 tablet (75 mg) by mouth once daily.   doxycycline (Vibramycin) 100 mg capsule More than a month  No No   Sig: Take 1 capsule (100 mg) by mouth 2 times a day for 7 days. Take with at least 8 ounces (large glass) of water, do not lie down for 30 minutes after   Patient not taking: Reported on 8/21/2024   fluticasone (Flonase) 50 mcg/actuation nasal spray Unknown Family Member No PRN   Sig: Administer 2 sprays into each nostril once daily. Shake gently. Before first use, prime pump. After use, clean tip and replace cap.   furosemide (Lasix) 40 mg tablet 8/20/2024 Family Member Yes Yes   Sig:  Take 2 tablets (80 mg) by mouth 2 times daily (morning and late afternoon).   glucosamine sulfate (GLUCOSAMINE ORAL) Past Week Family Member Yes Yes   Sig: Take 1 tablet by mouth once daily.   losartan (Cozaar) 100 mg tablet 8/20/2024 Family Member No Yes   Sig: Take 1 tablet (100 mg) by mouth once daily. Do not fill before August 7, 2024.   nitroglycerin (Nitrostat) 0.4 mg SL tablet Unknown Family Member Yes PRN   Sig: Place under the tongue.   potassium chloride CR 20 mEq ER tablet 8/20/2024 Family Member Yes Yes   Sig: Take 1 tablet (20 mEq) by mouth once daily. With dinner   sodium chloride (Ocean) 0.65 % nasal spray Unknown Family Member Yes PRN   Sig: Administer 1 spray into each nostril if needed for congestion.   soybean, fermented (NATTOKINASE ORAL) Past Week Family Member Yes Yes   Sig: Take 1 tablet by mouth once daily.      Facility-Administered Medications: None            The list below reflectives the updated allergy list. Please review each documented allergy for additional clarification and justification.  Allergies  Reviewed by Norma Neri RN on 8/21/2024        Severity Reactions Comments    Lipitor [atorvastatin] Not Specified Unknown     Statins-hmg-coa Reductase Inhibitors Not Specified Unknown     Zocor [simvastatin] Not Specified Unknown           Below are additional concerns with the patient's PTA list.  - family member interviewed   - completed doxycycline therapy from previous admission   - potassium 10mEq recently increased to 20mEq -> patient struggles with swallowing 2x 10mEq tablets due to large size; consider sending rx for potassium 20MEq at discharge if continuing with this strength   - Meds-2-beds preferred; [facility] is [facility]  [id number] Pharmacy Resident

## 2024-08-21 NOTE — ED PROVIDER NOTES
HPI   Chief Complaint   Patient presents with    Tremors    Cough       84-year-old female from home by EMS for chief complaint of shaking and coughing.  Patient's been coughing and shaking all day and now has leg discomfort.  Her Lasix was recently increased from 1.5 twice a day to 2 pills twice a day which is 80 mg twice daily.  Denies any fevers or chills.  Only has shortness of breath with the coughing.  She cannot stop coughing.  No nausea vomiting diarrhea or constipation.    Past medical history of chronic kidney disease stage III cellulitis hypertension hypercholesterolemia coronary artery disease.              Patient History   Past Medical History:   Diagnosis Date    Cellulitis of unspecified part of limb 06/15/2021    Cellulitis, leg    Chronic kidney disease, stage 3 unspecified (Multi) 07/31/2019    CKD (chronic kidney disease), stage III    Essential (primary) hypertension     Benign essential hypertension    Familial hypercholesterolemia 06/07/2013    Essential familial hypercholesterolemia    Other conditions influencing health status 06/07/2013    Coronary Artery Disease    Personal history of other diseases of the respiratory system 05/03/2018    History of acute sinusitis    Personal history of other mental and behavioral disorders 05/03/2018    History of depression     Past Surgical History:   Procedure Laterality Date    BACK SURGERY  06/07/2013    Lower Back Surgery    CARDIAC CATHETERIZATION N/A 3/27/2024    Procedure: Left & Right Heart Cath w Angiography & LV;  Surgeon: Rikki Briones MD;  Location: [facility] Cardiac Cath Lab;  Service: Cardiovascular;  Laterality: N/A;  3/27/24--900 am for R+LHC 92206 for CAD with angina I25.119 and PAH I27.20  ACAP; Faithrobbie sorensen, SP    CARDIAC PACEMAKER PLACEMENT  06/07/2013    Pacemaker Placement    CORONARY ANGIOPLASTY WITH STENT PLACEMENT  06/07/2013    Cath Stent Placement     No family history on file.  Social History     Tobacco Use    Smoking  status: Never    Smokeless tobacco: Never   Vaping Use    Vaping status: Never Used   Substance Use Topics    Alcohol use: Never    Drug use: Never       Physical Exam   ED Triage Vitals   Temp Pulse Resp BP   -- -- -- --      SpO2 Temp src Heart Rate Source Patient Position   -- -- -- --      BP Location FiO2 (%)     -- --       Physical Exam  Vitals and nursing note reviewed.   Constitutional:       Appearance: Normal appearance. She is obese. She is ill-appearing.   HENT:      Head: Normocephalic and atraumatic.      Nose: Nose normal.      Mouth/Throat:      Mouth: Mucous membranes are moist.   Eyes:      Extraocular Movements: Extraocular movements intact.      Pupils: Pupils are equal, round, and reactive to light.   Cardiovascular:      Rate and Rhythm: Normal rate and regular rhythm.   Pulmonary:      Effort: Pulmonary effort is normal.      Breath sounds: Rales present.   Abdominal:      General: Abdomen is flat.      Palpations: Abdomen is soft.   Musculoskeletal:         General: Normal range of motion.      Cervical back: Normal range of motion.      Right lower leg: Edema present.      Left lower leg: Edema present.      Comments: 2+ pedal edema bilaterally in lower extremities with chronic venous stasis skin changes   Skin:     General: Skin is warm and dry.      Capillary Refill: Capillary refill takes less than 2 seconds.   Neurological:      General: No focal deficit present.      Mental Status: She is alert.   Psychiatric:         Mood and Affect: Mood normal.           ED Course & MDM   ED Course as of 08/22/24 0524   Wed Aug 21, 2024   0149 EKG interpreted by me shows a heart rate is 70 A-fib with occasional PVCs low voltage QRS there is a lot of artifact present secondary to shaking, inferior lateral ST and T wave abnormalities as a result [TP]   0154 Patient given Ativan and Norflex for leg cramping family states she needs something now when she is in a ton of pain [TP]   0210 POTASSIUM: 4.9  [TP]   0236 CT ordered to rule out pneumonia [TP]      ED Course User Index  [TP] Bertha Sauer DO         Diagnoses as of 08/22/24 0524   Cough, unspecified type   Acute bronchitis, unspecified organism   Acute congestive heart failure, unspecified heart failure type (Multi)                 No data recorded     [place] Coma Scale Score: 15 (08/21/24 0800 : Seema Jones RN)                           Medical Decision Making  Medical Decision Making:   [unfilled]       Patient will be hospitalized for further management and treatment at this time.  Differential includes COVID flu pneumonia RSV heart failure.  Bertha Sauer D.O.  Emergency Medicine          Procedure  Procedures     Bertha Sauer DO  08/22/24 0524

## 2024-08-21 NOTE — DISCHARGE INSTRUCTIONS
You can take benzonatate for cough up to three times a day - you may want to take it three times a day for the next couple of days until your congestion starts to clear up and then just use only if needed.

## 2024-08-21 NOTE — DISCHARGE SUMMARY
Discharge Diagnosis  Involuntary jerky movements, muscle spasms, acute sinusitis    Issues Requiring Follow-Up  Home on antibiotics, flexeril  Repeat labs with PCP  PCP to refer to neurology if necessary but no jerky movements or leg spasms while here      Discharge Meds     Your medication list        START taking these medications        Instructions Last Dose Given Next Dose Due   amoxicillin-pot clavulanate 500-125 mg tablet  Commonly known as: Augmentin      Take 1 tablet by mouth every 12 hours for 9 doses.       cyclobenzaprine 5 mg tablet  Commonly known as: Flexeril      Take 1 tablet (5 mg) by mouth 3 times a day as needed for muscle spasms.              CONTINUE taking these medications        Instructions Last Dose Given Next Dose Due   benzonatate 200 mg capsule  Commonly known as: Tessalon      Take 1 capsule (200 mg) by mouth 3 times a day as needed for cough. Do not crush or chew.       carvedilol 12.5 mg tablet  Commonly known as: Coreg           cholecalciferol 50 mcg (2,000 unit) capsule  Commonly known as: Vitamin D-3           clopidogrel 75 mg tablet  Commonly known as: Plavix           fluticasone 50 mcg/actuation nasal spray  Commonly known as: Flonase      Administer 2 sprays into each nostril once daily. Shake gently. Before first use, prime pump. After use, clean tip and replace cap.       furosemide 40 mg tablet  Commonly known as: Lasix           GLUCOSAMINE ORAL           losartan 100 mg tablet  Commonly known as: Cozaar      Take 1 tablet (100 mg) by mouth once daily. Do not fill before August 7, 2024.       NATTOKINASE ORAL           Nitrostat 0.4 mg SL tablet  Generic drug: nitroglycerin           potassium chloride CR 20 mEq ER tablet  Commonly known as: Klor-Con M20           sodium chloride 0.65 % nasal spray  Commonly known as: Ocean           UNABLE TO FIND           UNABLE TO FIND           VALERIAN ROOT ORAL                  STOP taking these medications      doxycycline 100  mg capsule  Commonly known as: Vibramycin                  Where to Get Your Medications        These medications were sent to [facility]  [address], [place] [address]      Hours: 9 AM to 5 PM Mon-Fri Phone: [phone]   amoxicillin-pot clavulanate 500-125 mg tablet  benzonatate 200 mg capsule  cyclobenzaprine 5 mg tablet         Test Results Pending At Discharge  Pending Labs       Order Current Status    Extra Urine Gray Tube In process    Urinalysis with Reflex Culture and Microscopic In process            Hospital Course   From HPI:  Nadine Qureshi is a 84 y.o. female with a past medical history of coronary artery disease, persistent atrial fibrillation, pacemaker placement, CKD stage iv, hyperlipidemia, hypertension, normocytic anemia and diastolic heart failure who was brought to the hospital for persistent cough, muscle spasm and jerking movements.  Patient states that the cough is productive of mainly clear sputum sometimes yellow sputum.  The spasms were mainly in her legs but also had some in her arms for the past 2 days.  She denies confusion or loss of consciousness.  She has no fever or chills.  Her Lasix was recently increased to 80 mg twice daily.  She has lost about 13 pounds since her last admission.  She has leg swelling which is mild.  She denies fever or chills.  She denies chest pain, palpitation, nausea, vomiting, abdominal pain, melena, hematochezia, hematuria, headache, dizziness, neck pain or neck spasm.  History was obtained from the patient, her 2 daughters at the bedside as well as the chart. No jerky movements or leg spasms while in observation.  Symptoms improved in ED after muscle relaxer and ativan given.  Evaluated by cardiology, pacemaker interrogated without acute findings and she was cleared for dc.  CT head showing extensive sinus disease so she was started on renally dosed Augmentin.  Recommend follow up with PCP to recheck labs and symptoms.  On day of  discharge, patient denied CP, SOB, n/v/diarrhea/constipation, was tolerating diet and ambulating without issue.  Patient appeared hemodynamically stable at time of discharge. Discussed with attending physician who agreed with discharge plan.  Time spent on discharge including patient evaluation, education, coordination of care with consultants, attending physician, care coordination and nursing was 35 minutes.        Pertinent Physical Exam At Time of Discharge  Physical Exam  Constitutional: alert and oriented x 3, awake, cooperative, no acute distress  Skin: warm and dry  Head/Neck: Normocephalic, atraumatic  Eyes: clear sclera  ENMT: mucous membranes moist  Cardio: Regular rate and rhythm  Resp: Diminished air entry  Gastrointestinal: Soft, nontender, nondistended  Musculoskeletal: ROM intact, no joint swelling  Extremities: 1+ edema bilateral lower extremities.  There is some chronic stasis dermatitis, with some redness in bilateral leg which is chronic as per the patient's daughters were in the room.  The legs were warm but the daughter states that they had warm compress on her legs because she was having spasm.  Neuro: lert and oriented x 3, sensation is intact.  Patient moves all limbs against resistance.  Psychological: Appropriate mood and behavior  Outpatient Follow-Up  No future appointments.      Nelli Rueda PA-C

## 2024-08-22 ENCOUNTER — DOCUMENTATION (OUTPATIENT)
Dept: PRIMARY CARE | Facility: CLINIC | Age: 84
End: 2024-08-22
Payer: COMMERCIAL

## 2024-08-23 ENCOUNTER — PATIENT OUTREACH (OUTPATIENT)
Dept: PRIMARY CARE | Facility: CLINIC | Age: 84
End: 2024-08-23
Payer: COMMERCIAL

## 2024-08-23 DIAGNOSIS — Z09 HOSPITAL DISCHARGE FOLLOW-UP: ICD-10-CM

## 2024-08-23 DIAGNOSIS — J20.9 ACUTE BRONCHITIS, UNSPECIFIED ORGANISM: ICD-10-CM

## 2024-08-23 LAB
Q ONSET: 229 MS
QRS COUNT: 11 BEATS
QRS DURATION: 66 MS
QT INTERVAL: 382 MS
QTC CALCULATION(BAZETT): 412 MS
QTC FREDERICIA: 402 MS
R AXIS: 77 DEGREES
T AXIS: -31 DEGREES
T OFFSET: 420 MS
VENTRICULAR RATE: 70 BPM

## 2024-08-26 ENCOUNTER — APPOINTMENT (OUTPATIENT)
Dept: PRIMARY CARE | Facility: CLINIC | Age: 84
End: 2024-08-26
Payer: COMMERCIAL

## 2024-08-26 VITALS
SYSTOLIC BLOOD PRESSURE: 98 MMHG | DIASTOLIC BLOOD PRESSURE: 56 MMHG | WEIGHT: 177 LBS | HEART RATE: 80 BPM | OXYGEN SATURATION: 97 % | BODY MASS INDEX: 30.37 KG/M2

## 2024-08-26 DIAGNOSIS — I50.43 ACUTE ON CHRONIC COMBINED SYSTOLIC AND DIASTOLIC CONGESTIVE HEART FAILURE (MULTI): ICD-10-CM

## 2024-08-26 DIAGNOSIS — J01.00 ACUTE MAXILLARY SINUSITIS, RECURRENCE NOT SPECIFIED: ICD-10-CM

## 2024-08-26 DIAGNOSIS — N18.30 STAGE 3 CHRONIC KIDNEY DISEASE DUE TO BENIGN HYPERTENSION (MULTI): ICD-10-CM

## 2024-08-26 DIAGNOSIS — J32.1 CHRONIC FRONTAL SINUSITIS: Primary | ICD-10-CM

## 2024-08-26 DIAGNOSIS — M62.838 MUSCLE SPASM: ICD-10-CM

## 2024-08-26 DIAGNOSIS — R05.3 CHRONIC COUGH: ICD-10-CM

## 2024-08-26 DIAGNOSIS — I12.9 STAGE 3 CHRONIC KIDNEY DISEASE DUE TO BENIGN HYPERTENSION (MULTI): ICD-10-CM

## 2024-08-26 PROBLEM — R21 RASH: Status: RESOLVED | Noted: 2024-03-05 | Resolved: 2024-08-26

## 2024-08-26 PROBLEM — K52.9 CHRONIC DIARRHEA: Status: RESOLVED | Noted: 2024-03-05 | Resolved: 2024-08-26

## 2024-08-26 PROBLEM — J20.9 ACUTE BRONCHITIS: Status: RESOLVED | Noted: 2024-08-21 | Resolved: 2024-08-26

## 2024-08-26 PROCEDURE — 1111F DSCHRG MED/CURRENT MED MERGE: CPT | Performed by: FAMILY MEDICINE

## 2024-08-26 PROCEDURE — 1160F RVW MEDS BY RX/DR IN RCRD: CPT | Performed by: FAMILY MEDICINE

## 2024-08-26 PROCEDURE — 3078F DIAST BP <80 MM HG: CPT | Performed by: FAMILY MEDICINE

## 2024-08-26 PROCEDURE — 1159F MED LIST DOCD IN RCRD: CPT | Performed by: FAMILY MEDICINE

## 2024-08-26 PROCEDURE — 99496 TRANSJ CARE MGMT HIGH F2F 7D: CPT | Performed by: FAMILY MEDICINE

## 2024-08-26 PROCEDURE — 3074F SYST BP LT 130 MM HG: CPT | Performed by: FAMILY MEDICINE

## 2024-08-26 RX ORDER — CYCLOBENZAPRINE HCL 5 MG
5 TABLET ORAL NIGHTLY PRN
Qty: 30 TABLET | Refills: 2 | Status: SHIPPED | OUTPATIENT
Start: 2024-08-26

## 2024-08-26 RX ORDER — AMOXICILLIN AND CLAVULANATE POTASSIUM 500; 125 MG/1; MG/1
1 TABLET, FILM COATED ORAL EVERY 12 HOURS SCHEDULED
Qty: 20 TABLET | Refills: 0 | Status: SHIPPED | OUTPATIENT
Start: 2024-08-26 | End: 2024-09-05

## 2024-08-26 NOTE — PATIENT INSTRUCTIONS
Cut the furosemide back to 60 mg twice a day     Fluid intake   keep to  56 ounces  a day       Be sure to follow up with cardiology     I will order home health care  to help    I would keep you on the Augmentin for 10 more days     TREATMENT FOR SINUSITIS AND UPPER RESPIRATORY INFECTIONS:     Drink plenty of fluids, especially water.     Used humidifiers, steam, hot liquids to moisten your nasal passages.      Saline nasal spray often helps,  Simply Saline is a nice over the counter saline spray that you can use as much as you want.     IF DIRECTED TO DO SO:    You can use Afrin nasal spray for the first 3 days  ONLY , after that, it will make you worse, not better. DO NOT USE IN CHILDREN UNDER 6 YEARS OF AGE OR IF YOU HAVE ANY HYPERTENTION OR HEART ISSUES.      Mucinex or guaifenesin is an over the counter medication that often helps loosen the mucous.  DO NOT USE IN CHILDREN UNDER 6 YEARS OF AGE.      Please be sure to call or follow-up if you are not better in 5-10 days, or if you are worsening.      The most common cause of upper respiratory infections are viruses - which no not need an antibiotic to get better.   We want your own immune system to fight the virus so you or your child develop immunity to it.    However,  people can develop pneumonia, sinus infections and sometimes ear infections from a virus  - which may need an antibiotic.   So if you are showing signs of breathing issues,  or severe ear pain or facial pain with fevers, of if you are no better after 10 days , its important that you contact us.        If you are prescribed an antibiotic,  it's a good idea to take a probiotic to help prevent diarrhea and yeast infections.  This would be eating a yogurt daily or taking something like acidophillus or Culturelle.

## 2024-08-26 NOTE — PROGRESS NOTES
"Patient: Nadine Qureshi  : 1940  PCP: Sherrie Ortiz MD  MRN: [id number]  Program: Transitional Care Management  Status: Enrolled  Effective Dates: 2024 - present  Responsible Staff: Chandrika Merrill  Social Determinants to be Addressed: Physical Activity, Social Connections, Stress         Nadine Qureshi is a 84 y.o. female presenting today for follow-up after being discharged from the hospital 5 days ago. The main problem requiring admission was intractable cough , muscle spasms.  The discharge summary and/or Transitional Care Management documentation was reviewed. Medication reconciliation was performed as indicated via the \"Peter as Reviewed\" timestamp.     Nadine Qureshi was contacted by Transitional Care Management services two days after her discharge. This encounter and supporting documentation was reviewed.    Discharge summary reviewed -   To ER with intractable cough and periodic severe muscle spasms    CT showed extensive sinusitis  -   Home on Augmentin 4 more days      CT chest - Left sided cardiomegally  with mild pulm venous congestion     Labs -   HBG 10.5   BNP  673  (down from 1009)   Creat 2.13   Urine clear     Today - here with family   Tuesday - was having severe spasms legs and arms   This is a bit better - on flexeril hs  -this is helping her  - would like RX     Lasix was at 60 mg BID - but now at 80 mg twice a day -   Minimal swelling now -     She is very weak and tired -  Still with productive cough  - but not as severe     They feel it is a considerable and taxing effort to get her out of the house -   They are wondering about home care       Did have cath in March:     CONCLUSIONS:   1. Triple vessel disease.   2. RHC shows severe pulmonary HTN, mPAP 45-50 mm Hg, mPCWP 25 mm Hg, mRA 21 mm Hg.   3. Reduced cardiac output, no shunts.    She keep declining a sleep study   They did not make an appt with lung specialist       Review of Systems    BP 98/56 (BP " Location: Right arm, Patient Position: Sitting, BP Cuff Size: Large adult)   Pulse 80   Wt 80.3 kg (177 lb)   SpO2 97%   BMI 30.37 kg/m²     Physical Exam  Vitals reviewed.   Constitutional:       General: She is not in acute distress.     Appearance: Normal appearance.      Comments: In wheelchair - productive cough -   Tired appearing -   No resp distress    HENT:      Head: Normocephalic and atraumatic.      Nose: Nose normal.      Mouth/Throat:      Mouth: Mucous membranes are moist.      Pharynx: No posterior oropharyngeal erythema.   Eyes:      Extraocular Movements: Extraocular movements intact.      Conjunctiva/sclera: Conjunctivae normal.      Pupils: Pupils are equal, round, and reactive to light.   Cardiovascular:      Rate and Rhythm: Normal rate and regular rhythm.      Heart sounds: Normal heart sounds. No murmur heard.  Pulmonary:      Effort: Pulmonary effort is normal. No respiratory distress.      Breath sounds: Normal breath sounds. No wheezing.   Musculoskeletal:      Cervical back: No rigidity.   Lymphadenopathy:      Cervical: No cervical adenopathy.   Skin:     General: Skin is warm and dry.      Findings: No rash.   Neurological:      Mental Status: Mental status is at baseline.   Psychiatric:         Thought Content: Thought content normal.      Comments: Very flat affect today          The complexity of medical decision making for this patient's transitional care is high.    Assessment/Plan   Problem List Items Addressed This Visit             ICD-10-CM       High    Congestive heart failure (Multi) I50.9       Medium    Cough R05.9    Muscle spasm M62.838    Relevant Medications    cyclobenzaprine (Flexeril) 5 mg tablet    Maxillary sinusitis J32.0    Relevant Medications    amoxicillin-pot clavulanate (Augmentin) 500-125 mg tablet     Other Visit Diagnoses         Codes    Chronic frontal sinusitis    -  Primary J32.1          Pt s/p hospitalization -     Found to have extensive  sinusitis   And was having severe muscle spasms -   I wonder if it was from the increase of diuretics     BP low today too     Try to cut back again on furosemide to 60 BID     Can  cont flexeril has for now     Due to how sick she has been and her extensive sinusitis - would treat for 14 days -   Abx rx given    Very weak and deconditioned -   Heart and severe pulm HTN may be part of the issue -   Agreed to home health care order     I will also message with her cardiologist to determine if he wants further testing or follow up     We discussed at visit any disease processes that were of concern as well as the risks, benefits and instructions of any new medication provided.    See orders and discussion section for information handed to patient on their Clinical Summary.   Patient (and/or caretaker of patient if present)  stated all questions were answered, and they voiced understanding of instructions.

## 2024-08-26 NOTE — PROGRESS NOTES
TCM outreach completed 8/22/24  Patient is scheduled within 14 days of discharge on (8/26/24) for hospital follow up, however was unable to reach patient during two business days after discharge despite at least two attempts made.    Moderately complex & follow-up within 14 days- bill [id number] for hospital follow up.   Highly complex and follow-up visit within 7 days-can bill [id number] for hospital follow up    * Virtual follow up needs modifier added (95 or GT)  AWV AND TCM CAN BE BILLED TOGETHER WITH 25 MODIFIER     Discharge Facility: [facility]  Discharge Diagnosis: Involuntary jerky movements, muscle spasms, acute sinusitis   Admission Date: 8/21/24  Discharge Date: 8/21/24  Hospital Encounter and Summary Linked: Yes    Issues Requiring Follow-Up  Home on antibiotics, flexeril  Repeat labs with PCP  PCP to refer to neurology if necessary but no jerky movements or leg spasms while here

## 2024-08-26 NOTE — PROGRESS NOTES
Subjective   Patient ID: Nadine Qureshi is a 84 y.o. female who presents for No chief complaint on file..    HPI     Review of Systems    Objective   There were no vitals taken for this visit.    Physical Exam    Assessment/Plan

## 2024-08-27 ENCOUNTER — DOCUMENTATION (OUTPATIENT)
Dept: PRIMARY CARE | Facility: CLINIC | Age: 84
End: 2024-08-27
Payer: COMMERCIAL

## 2024-08-27 ENCOUNTER — HOME HEALTH ADMISSION (OUTPATIENT)
Dept: HOME HEALTH SERVICES | Facility: HOME HEALTH | Age: 84
End: 2024-08-27
Payer: COMMERCIAL

## 2024-08-27 ENCOUNTER — DOCUMENTATION (OUTPATIENT)
Dept: HOME HEALTH SERVICES | Facility: HOME HEALTH | Age: 84
End: 2024-08-27
Payer: COMMERCIAL

## 2024-08-27 NOTE — PROGRESS NOTES
"Message with DR THERESA Davidson    His reply :     \"Pulmonary hypertension, likely due to decompensated heart failure and diastolic dysfunction. Echo on August 2 showed that the PA pressure had come down to around 58 mmHg no interventions other than trying to keep her euvolemic she was offered LAD angioplasty and stent and Atrial fibrillation ablation, and she has declined those options. Sleep apnea certainly is another possibility. \"  "

## 2024-08-27 NOTE — HH CARE COORDINATION
Home Care received a Referral for Nursing and Physical Therapy. We have processed the referral for a Start of Care on 8/29.     If you have any questions or concerns, please feel free to contact us at [phone]. Follow the prompts, enter your five digit zip code, and you will be directed to your care team on [facility].

## 2024-08-29 DIAGNOSIS — E87.6 HYPOKALEMIA: Primary | ICD-10-CM

## 2024-08-29 DIAGNOSIS — I50.43 ACUTE ON CHRONIC COMBINED SYSTOLIC AND DIASTOLIC CONGESTIVE HEART FAILURE (MULTI): Primary | ICD-10-CM

## 2024-08-29 DIAGNOSIS — I50.9 ACUTE ON CHRONIC CONGESTIVE HEART FAILURE, UNSPECIFIED HEART FAILURE TYPE (MULTI): ICD-10-CM

## 2024-08-29 RX ORDER — LOSARTAN POTASSIUM 100 MG/1
100 TABLET ORAL DAILY
Qty: 90 TABLET | Refills: 0 | Status: SHIPPED | OUTPATIENT
Start: 2024-08-29

## 2024-08-29 RX ORDER — POTASSIUM CHLORIDE 20 MEQ/1
20 TABLET, EXTENDED RELEASE ORAL DAILY
Qty: 90 TABLET | Refills: 0 | Status: SHIPPED | OUTPATIENT
Start: 2024-08-29

## 2024-08-30 ENCOUNTER — HOME CARE VISIT (OUTPATIENT)
Dept: HOME HEALTH SERVICES | Facility: HOME HEALTH | Age: 84
End: 2024-08-30
Payer: COMMERCIAL

## 2024-08-30 PROCEDURE — G0299 HHS/HOSPICE OF RN EA 15 MIN: HCPCS

## 2024-08-31 VITALS
HEART RATE: 60 BPM | OXYGEN SATURATION: 99 % | TEMPERATURE: 97.2 F | DIASTOLIC BLOOD PRESSURE: 64 MMHG | WEIGHT: 174 LBS | HEIGHT: 60 IN | RESPIRATION RATE: 20 BRPM | SYSTOLIC BLOOD PRESSURE: 110 MMHG | BODY MASS INDEX: 34.16 KG/M2

## 2024-08-31 ASSESSMENT — ENCOUNTER SYMPTOMS
PERSON REPORTING PAIN: PATIENT
PAIN LOCATION: GENERALIZED
PAIN SEVERITY GOAL: 0/10
SUBJECTIVE PAIN PROGRESSION: GRADUALLY IMPROVING
PAIN: 1
HIGHEST PAIN SEVERITY IN PAST 24 HOURS: 5/10
LOWEST PAIN SEVERITY IN PAST 24 HOURS: 0/10

## 2024-08-31 ASSESSMENT — ACTIVITIES OF DAILY LIVING (ADL): ENTERING_EXITING_HOME: MINIMUM ASSIST

## 2024-09-06 ENCOUNTER — PATIENT OUTREACH (OUTPATIENT)
Dept: PRIMARY CARE | Facility: CLINIC | Age: 84
End: 2024-09-06
Payer: COMMERCIAL

## 2024-09-06 DIAGNOSIS — Z09 HOSPITAL DISCHARGE FOLLOW-UP: ICD-10-CM

## 2024-09-06 NOTE — PROGRESS NOTES
Unable to reach patient for call back 14 days post discharge from the hospital. Left a  with call back number for patient to call if needed. If no voicemail available call attempts x 2 were made to contact the patient to assist with any questions or concerns patient may have. Patient did not follow up with their PCP within that time. TCM to follow up 30 days post discharge to reassess needs.

## 2024-09-14 ENCOUNTER — HOME CARE VISIT (OUTPATIENT)
Dept: HOME HEALTH SERVICES | Facility: HOME HEALTH | Age: 84
End: 2024-09-14
Payer: COMMERCIAL

## 2024-09-14 VITALS
HEART RATE: 60 BPM | TEMPERATURE: 97.7 F | DIASTOLIC BLOOD PRESSURE: 80 MMHG | OXYGEN SATURATION: 99 % | SYSTOLIC BLOOD PRESSURE: 112 MMHG | RESPIRATION RATE: 18 BRPM

## 2024-09-14 PROCEDURE — G0300 HHS/HOSPICE OF LPN EA 15 MIN: HCPCS

## 2024-09-14 ASSESSMENT — ENCOUNTER SYMPTOMS
CHANGE IN APPETITE: UNCHANGED
APPETITE LEVEL: GOOD
LAST BOWEL MOVEMENT: 67097

## 2024-09-19 ENCOUNTER — HOME CARE VISIT (OUTPATIENT)
Dept: HOME HEALTH SERVICES | Facility: HOME HEALTH | Age: 84
End: 2024-09-19
Payer: COMMERCIAL

## 2024-09-19 VITALS
HEART RATE: 60 BPM | BODY MASS INDEX: 33.98 KG/M2 | WEIGHT: 174 LBS | RESPIRATION RATE: 16 BRPM | DIASTOLIC BLOOD PRESSURE: 64 MMHG | TEMPERATURE: 97.5 F | SYSTOLIC BLOOD PRESSURE: 102 MMHG

## 2024-09-19 PROCEDURE — G0162 HHC RN E&M PLAN SVS, 15 MIN: HCPCS

## 2024-09-19 PROCEDURE — G0299 HHS/HOSPICE OF RN EA 15 MIN: HCPCS

## 2024-09-19 ASSESSMENT — ACTIVITIES OF DAILY LIVING (ADL)
DRESSING_LB_CURRENT_FUNCTION: INDEPENDENT
HOME_HEALTH_OASIS: 00
OASIS_M1830: 00
TOILETING: 1
TOILETING: INDEPENDENT
PHYSICAL TRANSFERS ASSESSED: 1
CURRENT_FUNCTION: INDEPENDENT
DRESSING_UB_CURRENT_FUNCTION: INDEPENDENT

## 2024-09-19 ASSESSMENT — ENCOUNTER SYMPTOMS: DENIES PAIN: 1

## 2024-09-20 ENCOUNTER — PATIENT OUTREACH (OUTPATIENT)
Dept: PRIMARY CARE | Facility: CLINIC | Age: 84
End: 2024-09-20
Payer: COMMERCIAL

## 2024-09-20 DIAGNOSIS — Z09 HOSPITAL DISCHARGE FOLLOW-UP: ICD-10-CM

## 2024-09-20 NOTE — PROGRESS NOTES
Unable to reach patient for one month post discharge follow up call.   If no voicemail available call attempts x 2 were made to contact the patient to assist with any questions or concerns patient may have. Line continued to ring with no vm ability. Patient dis-enrolled from [facility] this date due to inability to reach the patient for 30 days post discharge.

## 2024-09-21 ASSESSMENT — ENCOUNTER SYMPTOMS
DYSPNEA ON EXERTION: 1
APPETITE LEVEL: GOOD
OCCASIONAL FEELINGS OF UNSTEADINESS: 1
SHORTNESS OF BREATH: 1
LIMITED RANGE OF MOTION: 1
FATIGUES EASILY: 1
LOWER EXTREMITY EDEMA: 1
LOSS OF SENSATION IN FEET: 0
CHANGE IN APPETITE: UNCHANGED
DEPRESSION: 0
DYSPNEA ACTIVITY LEVEL: AFTER AMBULATING MORE THAN 20 FT
MUSCLE WEAKNESS: 1

## 2024-09-21 ASSESSMENT — ACTIVITIES OF DAILY LIVING (ADL)
OASIS_M1830: 03
CURRENT_FUNCTION: MODERATE ASSIST
AMBULATION ASSISTANCE: 1
TRANSPORTATION: DEPENDENT
PHYSICAL TRANSFERS ASSESSED: 1
DRESSING_UB_CURRENT_FUNCTION: MODERATE ASSIST
AMBULATION ASSISTANCE: ONE PERSON
TRANSPORTATION ASSESSED: 1
TOILETING: 1
TOILETING: MODERATE ASSIST
DRESSING_LB_CURRENT_FUNCTION: MODERATE ASSIST

## 2025-05-27 ENCOUNTER — APPOINTMENT (OUTPATIENT)
Dept: CARDIOLOGY | Facility: HOSPITAL | Age: 85
DRG: 291 | End: 2025-05-27
Payer: COMMERCIAL

## 2025-05-27 ENCOUNTER — HOSPITAL ENCOUNTER (INPATIENT)
Facility: HOSPITAL | Age: 85
LOS: 3 days | Discharge: HOME | End: 2025-05-30
Attending: STUDENT IN AN ORGANIZED HEALTH CARE EDUCATION/TRAINING PROGRAM | Admitting: INTERNAL MEDICINE
Payer: COMMERCIAL

## 2025-05-27 ENCOUNTER — APPOINTMENT (OUTPATIENT)
Dept: RADIOLOGY | Facility: HOSPITAL | Age: 85
DRG: 291 | End: 2025-05-27
Payer: COMMERCIAL

## 2025-05-27 DIAGNOSIS — N18.30 STAGE 3 CHRONIC KIDNEY DISEASE DUE TO BENIGN HYPERTENSION (MULTI): ICD-10-CM

## 2025-05-27 DIAGNOSIS — I10 BENIGN ESSENTIAL HYPERTENSION: ICD-10-CM

## 2025-05-27 DIAGNOSIS — I27.20 PULMONARY HYPERTENSION, UNSPECIFIED (MULTI): ICD-10-CM

## 2025-05-27 DIAGNOSIS — I12.9 STAGE 3 CHRONIC KIDNEY DISEASE DUE TO BENIGN HYPERTENSION (MULTI): ICD-10-CM

## 2025-05-27 DIAGNOSIS — R06.02 SHORTNESS OF BREATH: ICD-10-CM

## 2025-05-27 DIAGNOSIS — I50.9 ACUTE ON CHRONIC CONGESTIVE HEART FAILURE, UNSPECIFIED HEART FAILURE TYPE: Primary | ICD-10-CM

## 2025-05-27 DIAGNOSIS — K21.9 GASTROESOPHAGEAL REFLUX DISEASE WITHOUT ESOPHAGITIS: ICD-10-CM

## 2025-05-27 LAB
ALBUMIN SERPL BCP-MCNC: 4.1 G/DL (ref 3.4–5)
ALP SERPL-CCNC: 62 U/L (ref 33–136)
ALT SERPL W P-5'-P-CCNC: 9 U/L (ref 7–45)
ANION GAP SERPL CALC-SCNC: 16 MMOL/L (ref 10–20)
APPEARANCE UR: CLEAR
AST SERPL W P-5'-P-CCNC: 18 U/L (ref 9–39)
BASOPHILS # BLD AUTO: 0.03 X10*3/UL (ref 0–0.1)
BASOPHILS NFR BLD AUTO: 0.6 %
BILIRUB SERPL-MCNC: 0.6 MG/DL (ref 0–1.2)
BILIRUB UR STRIP.AUTO-MCNC: NEGATIVE MG/DL
BNP SERPL-MCNC: 1144 PG/ML (ref 0–99)
BUN SERPL-MCNC: 76 MG/DL (ref 6–23)
CALCIUM SERPL-MCNC: 8.8 MG/DL (ref 8.6–10.3)
CARDIAC TROPONIN I PNL SERPL HS: 31 NG/L (ref 0–13)
CARDIAC TROPONIN I PNL SERPL HS: 33 NG/L (ref 0–13)
CHLORIDE SERPL-SCNC: 102 MMOL/L (ref 98–107)
CO2 SERPL-SCNC: 25 MMOL/L (ref 21–32)
COLOR UR: COLORLESS
CREAT SERPL-MCNC: 2.37 MG/DL (ref 0.5–1.05)
EGFRCR SERPLBLD CKD-EPI 2021: 20 ML/MIN/1.73M*2
EOSINOPHIL # BLD AUTO: 0.1 X10*3/UL (ref 0–0.4)
EOSINOPHIL NFR BLD AUTO: 2.1 %
ERYTHROCYTE [DISTWIDTH] IN BLOOD BY AUTOMATED COUNT: 17.2 % (ref 11.5–14.5)
GLUCOSE SERPL-MCNC: 114 MG/DL (ref 74–99)
GLUCOSE UR STRIP.AUTO-MCNC: NORMAL MG/DL
HCT VFR BLD AUTO: 29.7 % (ref 36–46)
HGB BLD-MCNC: 9.2 G/DL (ref 12–16)
IMM GRANULOCYTES # BLD AUTO: 0.01 X10*3/UL (ref 0–0.5)
IMM GRANULOCYTES NFR BLD AUTO: 0.2 % (ref 0–0.9)
KETONES UR STRIP.AUTO-MCNC: NEGATIVE MG/DL
LEUKOCYTE ESTERASE UR QL STRIP.AUTO: NEGATIVE
LYMPHOCYTES # BLD AUTO: 0.77 X10*3/UL (ref 0.8–3)
LYMPHOCYTES NFR BLD AUTO: 15.9 %
MAGNESIUM SERPL-MCNC: 2.68 MG/DL (ref 1.6–2.4)
MCH RBC QN AUTO: 26.7 PG (ref 26–34)
MCHC RBC AUTO-ENTMCNC: 31 G/DL (ref 32–36)
MCV RBC AUTO: 86 FL (ref 80–100)
MONOCYTES # BLD AUTO: 0.6 X10*3/UL (ref 0.05–0.8)
MONOCYTES NFR BLD AUTO: 12.4 %
NEUTROPHILS # BLD AUTO: 3.33 X10*3/UL (ref 1.6–5.5)
NEUTROPHILS NFR BLD AUTO: 68.8 %
NITRITE UR QL STRIP.AUTO: NEGATIVE
NRBC BLD-RTO: 0 /100 WBCS (ref 0–0)
PH UR STRIP.AUTO: 5 [PH]
PLATELET # BLD AUTO: 164 X10*3/UL (ref 150–450)
POTASSIUM SERPL-SCNC: 4.4 MMOL/L (ref 3.5–5.3)
PROT SERPL-MCNC: 7 G/DL (ref 6.4–8.2)
PROT UR STRIP.AUTO-MCNC: NEGATIVE MG/DL
RBC # BLD AUTO: 3.45 X10*6/UL (ref 4–5.2)
RBC # UR STRIP.AUTO: NEGATIVE MG/DL
SODIUM SERPL-SCNC: 139 MMOL/L (ref 136–145)
SP GR UR STRIP.AUTO: 1.01
UROBILINOGEN UR STRIP.AUTO-MCNC: NORMAL MG/DL
WBC # BLD AUTO: 4.8 X10*3/UL (ref 4.4–11.3)

## 2025-05-27 PROCEDURE — 2500000004 HC RX 250 GENERAL PHARMACY W/ HCPCS (ALT 636 FOR OP/ED): Mod: JZ | Performed by: PHYSICIAN ASSISTANT

## 2025-05-27 PROCEDURE — 80053 COMPREHEN METABOLIC PANEL: CPT | Performed by: PHYSICIAN ASSISTANT

## 2025-05-27 PROCEDURE — 84484 ASSAY OF TROPONIN QUANT: CPT | Performed by: PHYSICIAN ASSISTANT

## 2025-05-27 PROCEDURE — 94760 N-INVAS EAR/PLS OXIMETRY 1: CPT

## 2025-05-27 PROCEDURE — 2500000001 HC RX 250 WO HCPCS SELF ADMINISTERED DRUGS (ALT 637 FOR MEDICARE OP): Performed by: NURSE PRACTITIONER

## 2025-05-27 PROCEDURE — 2500000004 HC RX 250 GENERAL PHARMACY W/ HCPCS (ALT 636 FOR OP/ED): Performed by: INTERNAL MEDICINE

## 2025-05-27 PROCEDURE — 2500000001 HC RX 250 WO HCPCS SELF ADMINISTERED DRUGS (ALT 637 FOR MEDICARE OP): Performed by: INTERNAL MEDICINE

## 2025-05-27 PROCEDURE — 36415 COLL VENOUS BLD VENIPUNCTURE: CPT | Performed by: PHYSICIAN ASSISTANT

## 2025-05-27 PROCEDURE — 93005 ELECTROCARDIOGRAM TRACING: CPT

## 2025-05-27 PROCEDURE — 85025 COMPLETE CBC W/AUTO DIFF WBC: CPT | Performed by: PHYSICIAN ASSISTANT

## 2025-05-27 PROCEDURE — 71045 X-RAY EXAM CHEST 1 VIEW: CPT | Performed by: STUDENT IN AN ORGANIZED HEALTH CARE EDUCATION/TRAINING PROGRAM

## 2025-05-27 PROCEDURE — 99285 EMERGENCY DEPT VISIT HI MDM: CPT | Performed by: STUDENT IN AN ORGANIZED HEALTH CARE EDUCATION/TRAINING PROGRAM

## 2025-05-27 PROCEDURE — 1200000002 HC GENERAL ROOM WITH TELEMETRY DAILY

## 2025-05-27 PROCEDURE — 96374 THER/PROPH/DIAG INJ IV PUSH: CPT

## 2025-05-27 PROCEDURE — 71045 X-RAY EXAM CHEST 1 VIEW: CPT

## 2025-05-27 PROCEDURE — 83880 ASSAY OF NATRIURETIC PEPTIDE: CPT | Performed by: PHYSICIAN ASSISTANT

## 2025-05-27 PROCEDURE — 81003 URINALYSIS AUTO W/O SCOPE: CPT | Performed by: INTERNAL MEDICINE

## 2025-05-27 PROCEDURE — 99223 1ST HOSP IP/OBS HIGH 75: CPT | Performed by: INTERNAL MEDICINE

## 2025-05-27 PROCEDURE — 83735 ASSAY OF MAGNESIUM: CPT | Performed by: PHYSICIAN ASSISTANT

## 2025-05-27 RX ORDER — CHOLECALCIFEROL (VITAMIN D3) 25 MCG
50 TABLET ORAL DAILY
Status: DISCONTINUED | OUTPATIENT
Start: 2025-05-27 | End: 2025-05-30 | Stop reason: HOSPADM

## 2025-05-27 RX ORDER — ISOSORBIDE MONONITRATE 30 MG/1
30 TABLET, EXTENDED RELEASE ORAL DAILY
Status: DISCONTINUED | OUTPATIENT
Start: 2025-05-27 | End: 2025-05-30 | Stop reason: HOSPADM

## 2025-05-27 RX ORDER — ACETAMINOPHEN 500 MG
5 TABLET ORAL NIGHTLY PRN
Status: DISCONTINUED | OUTPATIENT
Start: 2025-05-27 | End: 2025-05-30 | Stop reason: HOSPADM

## 2025-05-27 RX ORDER — ACETAMINOPHEN 325 MG/1
650 TABLET ORAL EVERY 6 HOURS PRN
Status: DISCONTINUED | OUTPATIENT
Start: 2025-05-27 | End: 2025-05-30 | Stop reason: HOSPADM

## 2025-05-27 RX ORDER — NITROGLYCERIN 0.4 MG/1
0.4 TABLET SUBLINGUAL EVERY 5 MIN PRN
Status: DISCONTINUED | OUTPATIENT
Start: 2025-05-27 | End: 2025-05-30 | Stop reason: HOSPADM

## 2025-05-27 RX ORDER — CYCLOBENZAPRINE HCL 10 MG
5 TABLET ORAL NIGHTLY PRN
Status: DISCONTINUED | OUTPATIENT
Start: 2025-05-27 | End: 2025-05-30 | Stop reason: HOSPADM

## 2025-05-27 RX ORDER — CLOPIDOGREL BISULFATE 75 MG/1
75 TABLET ORAL DAILY
Status: DISCONTINUED | OUTPATIENT
Start: 2025-05-27 | End: 2025-05-30 | Stop reason: HOSPADM

## 2025-05-27 RX ORDER — LOSARTAN POTASSIUM 50 MG/1
100 TABLET ORAL DAILY
Status: DISCONTINUED | OUTPATIENT
Start: 2025-05-28 | End: 2025-05-30 | Stop reason: HOSPADM

## 2025-05-27 RX ORDER — CARVEDILOL 12.5 MG/1
12.5 TABLET ORAL DAILY
Status: DISCONTINUED | OUTPATIENT
Start: 2025-05-28 | End: 2025-05-30 | Stop reason: HOSPADM

## 2025-05-27 RX ORDER — HYDROXYZINE HYDROCHLORIDE 25 MG/1
25 TABLET, FILM COATED ORAL ONCE
Status: COMPLETED | OUTPATIENT
Start: 2025-05-27 | End: 2025-05-27

## 2025-05-27 RX ORDER — FUROSEMIDE 10 MG/ML
80 INJECTION INTRAMUSCULAR; INTRAVENOUS EVERY 12 HOURS
Status: DISCONTINUED | OUTPATIENT
Start: 2025-05-27 | End: 2025-05-30 | Stop reason: HOSPADM

## 2025-05-27 RX ORDER — ASPIRIN 81 MG/1
81 TABLET ORAL DAILY
Status: DISCONTINUED | OUTPATIENT
Start: 2025-05-27 | End: 2025-05-30 | Stop reason: HOSPADM

## 2025-05-27 RX ORDER — FUROSEMIDE 10 MG/ML
40 INJECTION INTRAMUSCULAR; INTRAVENOUS ONCE
Status: COMPLETED | OUTPATIENT
Start: 2025-05-27 | End: 2025-05-27

## 2025-05-27 RX ORDER — PANTOPRAZOLE SODIUM 40 MG/1
40 TABLET, DELAYED RELEASE ORAL
Status: DISCONTINUED | OUTPATIENT
Start: 2025-05-28 | End: 2025-05-30 | Stop reason: HOSPADM

## 2025-05-27 RX ORDER — HEPARIN SODIUM 5000 [USP'U]/ML
5000 INJECTION, SOLUTION INTRAVENOUS; SUBCUTANEOUS EVERY 8 HOURS SCHEDULED
Status: DISCONTINUED | OUTPATIENT
Start: 2025-05-27 | End: 2025-05-30 | Stop reason: HOSPADM

## 2025-05-27 RX ADMIN — ASPIRIN 81 MG: 81 TABLET, DELAYED RELEASE ORAL at 17:58

## 2025-05-27 RX ADMIN — CLOPIDOGREL 75 MG: 75 TABLET ORAL at 17:58

## 2025-05-27 RX ADMIN — FUROSEMIDE 80 MG: 10 INJECTION, SOLUTION INTRAMUSCULAR; INTRAVENOUS at 22:11

## 2025-05-27 RX ADMIN — HYDROXYZINE HYDROCHLORIDE 25 MG: 25 TABLET, FILM COATED ORAL at 22:13

## 2025-05-27 RX ADMIN — FUROSEMIDE 40 MG: 10 INJECTION, SOLUTION INTRAMUSCULAR; INTRAVENOUS at 13:30

## 2025-05-27 RX ADMIN — Medication 50 MCG: at 17:58

## 2025-05-27 SDOH — SOCIAL STABILITY: SOCIAL INSECURITY: DO YOU FEEL UNSAFE GOING BACK TO THE PLACE WHERE YOU ARE LIVING?: NO

## 2025-05-27 SDOH — ECONOMIC STABILITY: FOOD INSECURITY: WITHIN THE PAST 12 MONTHS, THE FOOD YOU BOUGHT JUST DIDN'T LAST AND YOU DIDN'T HAVE MONEY TO GET MORE.: NEVER TRUE

## 2025-05-27 SDOH — SOCIAL STABILITY: SOCIAL INSECURITY: WITHIN THE LAST YEAR, HAVE YOU BEEN AFRAID OF YOUR PARTNER OR EX-PARTNER?: NO

## 2025-05-27 SDOH — SOCIAL STABILITY: SOCIAL INSECURITY: ARE THERE ANY APPARENT SIGNS OF INJURIES/BEHAVIORS THAT COULD BE RELATED TO ABUSE/NEGLECT?: NO

## 2025-05-27 SDOH — SOCIAL STABILITY: SOCIAL INSECURITY: WITHIN THE LAST YEAR, HAVE YOU BEEN HUMILIATED OR EMOTIONALLY ABUSED IN OTHER WAYS BY YOUR PARTNER OR EX-PARTNER?: NO

## 2025-05-27 SDOH — ECONOMIC STABILITY: INCOME INSECURITY: IN THE PAST 12 MONTHS HAS THE ELECTRIC, GAS, OIL, OR WATER COMPANY THREATENED TO SHUT OFF SERVICES IN YOUR HOME?: NO

## 2025-05-27 SDOH — ECONOMIC STABILITY: FOOD INSECURITY: WITHIN THE PAST 12 MONTHS, YOU WORRIED THAT YOUR FOOD WOULD RUN OUT BEFORE YOU GOT THE MONEY TO BUY MORE.: NEVER TRUE

## 2025-05-27 SDOH — SOCIAL STABILITY: SOCIAL INSECURITY
WITHIN THE LAST YEAR, HAVE YOU BEEN RAPED OR FORCED TO HAVE ANY KIND OF SEXUAL ACTIVITY BY YOUR PARTNER OR EX-PARTNER?: NO

## 2025-05-27 SDOH — SOCIAL STABILITY: SOCIAL INSECURITY: WERE YOU ABLE TO COMPLETE ALL THE BEHAVIORAL HEALTH SCREENINGS?: YES

## 2025-05-27 SDOH — SOCIAL STABILITY: SOCIAL INSECURITY
WITHIN THE LAST YEAR, HAVE YOU BEEN KICKED, HIT, SLAPPED, OR OTHERWISE PHYSICALLY HURT BY YOUR PARTNER OR EX-PARTNER?: NO

## 2025-05-27 SDOH — SOCIAL STABILITY: SOCIAL INSECURITY: ABUSE: ADULT

## 2025-05-27 SDOH — SOCIAL STABILITY: SOCIAL INSECURITY: HAS ANYONE EVER THREATENED TO HURT YOUR FAMILY OR YOUR PETS?: NO

## 2025-05-27 SDOH — SOCIAL STABILITY: SOCIAL INSECURITY: DO YOU FEEL ANYONE HAS EXPLOITED OR TAKEN ADVANTAGE OF YOU FINANCIALLY OR OF YOUR PERSONAL PROPERTY?: NO

## 2025-05-27 SDOH — SOCIAL STABILITY: SOCIAL INSECURITY: DOES ANYONE TRY TO KEEP YOU FROM HAVING/CONTACTING OTHER FRIENDS OR DOING THINGS OUTSIDE YOUR HOME?: NO

## 2025-05-27 SDOH — SOCIAL STABILITY: SOCIAL INSECURITY: HAVE YOU HAD THOUGHTS OF HARMING ANYONE ELSE?: NO

## 2025-05-27 SDOH — SOCIAL STABILITY: SOCIAL INSECURITY: ARE YOU OR HAVE YOU BEEN THREATENED OR ABUSED PHYSICALLY, EMOTIONALLY, OR SEXUALLY BY ANYONE?: NO

## 2025-05-27 ASSESSMENT — ACTIVITIES OF DAILY LIVING (ADL)
WALKS IN HOME: NEEDS ASSISTANCE
HEARING - RIGHT EAR: HEARING AID
DRESSING YOURSELF: NEEDS ASSISTANCE
LACK_OF_TRANSPORTATION: NO
ADEQUATE_TO_COMPLETE_ADL: YES
GROOMING: NEEDS ASSISTANCE
ASSISTIVE_DEVICE: WALKER;DENTURES UPPER;DENTURES LOWER;EYEGLASSES
BATHING: NEEDS ASSISTANCE
PATIENT'S MEMORY ADEQUATE TO SAFELY COMPLETE DAILY ACTIVITIES?: YES
HEARING - LEFT EAR: HEARING AID
JUDGMENT_ADEQUATE_SAFELY_COMPLETE_DAILY_ACTIVITIES: YES
LACK_OF_TRANSPORTATION: NO
TOILETING: NEEDS ASSISTANCE
FEEDING YOURSELF: INDEPENDENT

## 2025-05-27 ASSESSMENT — ENCOUNTER SYMPTOMS
WEAKNESS: 1
FATIGUE: 1
COUGH: 1
ENDOCRINE NEGATIVE: 1
ALLERGIC/IMMUNOLOGIC NEGATIVE: 1
PSYCHIATRIC NEGATIVE: 1
EYES NEGATIVE: 1
HEMATOLOGIC/LYMPHATIC NEGATIVE: 1
ABDOMINAL DISTENTION: 1
ARTHRALGIAS: 1
SHORTNESS OF BREATH: 1
WOUND: 1
UNEXPECTED WEIGHT CHANGE: 1
ACTIVITY CHANGE: 1

## 2025-05-27 ASSESSMENT — LIFESTYLE VARIABLES
SKIP TO QUESTIONS 9-10: 1
AUDIT-C TOTAL SCORE: 0
HOW OFTEN DO YOU HAVE A DRINK CONTAINING ALCOHOL: NEVER
AUDIT-C TOTAL SCORE: 0
HOW MANY STANDARD DRINKS CONTAINING ALCOHOL DO YOU HAVE ON A TYPICAL DAY: PATIENT DOES NOT DRINK
HOW OFTEN DO YOU HAVE 6 OR MORE DRINKS ON ONE OCCASION: NEVER

## 2025-05-27 ASSESSMENT — COGNITIVE AND FUNCTIONAL STATUS - GENERAL
MOBILITY SCORE: 18
DRESSING REGULAR UPPER BODY CLOTHING: A LITTLE
HELP NEEDED FOR BATHING: A LITTLE
STANDING UP FROM CHAIR USING ARMS: A LITTLE
PATIENT BASELINE BEDBOUND: NO
MOVING TO AND FROM BED TO CHAIR: A LITTLE
DRESSING REGULAR LOWER BODY CLOTHING: A LITTLE
WALKING IN HOSPITAL ROOM: A LOT
CLIMB 3 TO 5 STEPS WITH RAILING: A LOT
DAILY ACTIVITIY SCORE: 20
TOILETING: A LITTLE

## 2025-05-27 ASSESSMENT — PAIN SCALES - GENERAL
PAINLEVEL_OUTOF10: 0 - NO PAIN
PAINLEVEL_OUTOF10: 7
PAINLEVEL_OUTOF10: 0 - NO PAIN

## 2025-05-27 ASSESSMENT — PAIN - FUNCTIONAL ASSESSMENT: PAIN_FUNCTIONAL_ASSESSMENT: 0-10

## 2025-05-27 NOTE — ED PROVIDER NOTES
HPI   Chief Complaint   Patient presents with    Shortness of Breath    Leg Swelling     Pt states she has a history of CHF, last night she had an increase in shortness of breath and was unable to sleep due to not being able to lay flat. Pt states she has had a 4 pound weight gain over approx 1 week. She endorses leg swelling as well        An 85-year-old female coming for shortness of breath.  Patient states that she has had increased shortness of breath over the last few weeks.  She has had 4 pound weight gain this week.  She has had increased lower extremity edema.  She occasionally has chest pain on exertion as well as worsening shortness of breath on exertion.  She called her cardiologist who advised her to come in.  Patient is on 40 mg of Lasix twice daily.  She has not had any fevers or chills.  She does complain of a cough but states it is similar compared to prior.  She has not had any vomiting or diarrhea.  She has not had less intake of solids and liquids.  Patient does complain of weakness.      History provided by:  Patient          Patient History   Medical History[1]  Surgical History[2]  Family History[3]  Social History[4]    Physical Exam   ED Triage Vitals [05/27/25 1318]   Temperature Heart Rate Respirations BP   36.9 °C (98.4 °F) 65 16 112/62      Pulse Ox Temp src Heart Rate Source Patient Position   100 % -- -- --      BP Location FiO2 (%)     -- --       Physical Exam  Vitals and nursing note reviewed.   Constitutional:       General: She is not in acute distress.     Appearance: Normal appearance. She is not toxic-appearing.   HENT:      Head: Normocephalic and atraumatic.      Nose: Nose normal.      Mouth/Throat:      Mouth: Mucous membranes are moist.      Pharynx: Oropharynx is clear.   Eyes:      Extraocular Movements: Extraocular movements intact.      Conjunctiva/sclera: Conjunctivae normal.      Pupils: Pupils are equal, round, and reactive to light.   Cardiovascular:      Rate and  Rhythm: Normal rate and regular rhythm.      Pulses: Normal pulses.      Heart sounds: Normal heart sounds.   Pulmonary:      Effort: Pulmonary effort is normal. No respiratory distress.      Breath sounds: Normal breath sounds.   Abdominal:      General: Abdomen is flat. Bowel sounds are normal.      Palpations: Abdomen is soft.      Tenderness: There is no abdominal tenderness.   Musculoskeletal:         General: Normal range of motion.      Cervical back: Normal range of motion and neck supple.      Right lower leg: 3+ Pitting Edema present.      Left lower leg: 3+ Pitting Edema present.   Skin:     General: Skin is warm and dry.      Coloration: Skin is not jaundiced or pale.      Findings: No bruising.   Neurological:      General: No focal deficit present.      Mental Status: She is alert and oriented to person, place, and time. Mental status is at baseline.   Psychiatric:         Mood and Affect: Mood normal.         Behavior: Behavior normal.           ED Course & MDM   ED Course as of 05/27/25 1525   e May 27, 2025   1345 EKG completed at 1340 shows on my interpretation a paced rhythm with a rate of 60 bpm.  No signs of STEMI.  QTc 452 ms. [RS]      ED Course User Index  [RS] Stanislav Gill PA-C         Diagnoses as of 05/27/25 1525   Shortness of breath   Acute on chronic congestive heart failure, unspecified heart failure type                 No data recorded     [place] Coma Scale Score: 15 (05/27/25 1333 : Danitza Colón RN)                           Medical Decision Making  Summary:  Medical Decision Making:   Patient presented as described in HPI. Patient case including ROS, PE, and treatment and plan discussed with ED attending if attached as cosigner. Results from labs and or imaging included below if completed. Nadine Y Titus  is a 85 y.o. coming in for Patient presents with:  Shortness of Breath  Leg Swelling: Pt states she has a history of CHF, last night she had an increase in shortness of  breath and was unable to sleep due to not being able to lay flat. Pt states she has had a 4 pound weight gain over approx 1 week. She endorses leg swelling as well   .  Patient has had weight gain.  Increasing shortness of breath on exertion as well as occasional chest pain.  She does have cardiac history.  She sees Dr. Brandt.  She does have bilateral lower extremity pitting edema.  Patient was given 40 mg of IV Lasix.  Workup was initiated that includes a chest x-ray and cardiac workup.  She was found to have pleural effusions.  BNP was 1100.  Higher than it has been before.  Troponin slightly elevated.  She has no chest pain currently.  Repeat troponin will be completed.  She has slightly elevated magnesium.  Kidney function appears to be at baseline.  Patient will be treated for CHF exacerbation and will be hospitalized for further care management as we do not believe that outpatient care would benefit this patient.      Shared decision making was completed for required hospital stay. I also explained the plan and treatment course. Patient/guardian is in agreement with plan, treatment course, and state that they will comply.    Labs Reviewed  CBC WITH AUTO DIFFERENTIAL - Abnormal     WBC                           4.8                    nRBC                          0.0                    RBC                           3.45 (*)               Hemoglobin                    9.2 (*)                Hematocrit                    29.7 (*)               MCV                           86                     MCH                           26.7                   MCHC                          31.0 (*)               RDW                           17.2 (*)               Platelets                     164                    Neutrophils %                 68.8                   Immature Granulocytes %, Automated   0.2                    Lymphocytes %                 15.9                   Monocytes %                   12.4                    Eosinophils %                 2.1                    Basophils %                   0.6                    Neutrophils Absolute          3.33                   Immature Granulocytes Absolute, Au*   0.01                   Lymphocytes Absolute          0.77 (*)               Monocytes Absolute            0.60                   Eosinophils Absolute          0.10                   Basophils Absolute            0.03                COMPREHENSIVE METABOLIC PANEL - Abnormal     Glucose                       114 (*)                Sodium                        139                    Potassium                     4.4                    Chloride                      102                    Bicarbonate                   25                     Anion Gap                     16                     Urea Nitrogen                 76 (*)                 Creatinine                    2.37 (*)               eGFR                          20 (*)                 Calcium                       8.8                    Albumin                       4.1                    Alkaline Phosphatase          62                     Total Protein                 7.0                    AST                           18                     Bilirubin, Total              0.6                    ALT                           9                   MAGNESIUM - Abnormal     Magnesium                     2.68 (*)            SERIAL TROPONIN-INITIAL - Abnormal     Troponin I, High Sensitivity   33 (*)                   Narrative: Less than 99th percentile of normal range cutoff-                Female and children under 18 years old <14 ng/L; Male <21 ng/L: Negative                Repeat testing should be performed if clinically indicated.                                 Female and children under 18 years old 14-50 ng/L; Male 21-50 ng/L:                Consistent with possible cardiac damage and possible increased clinical                 risk. Serial measurements  may help to assess extent of myocardial damage.                                 >50 ng/L: Consistent with cardiac damage, increased clinical risk and                myocardial infarction. Serial measurements may help assess extent of                 myocardial damage.                                  NOTE: Children less than 1 year old may have higher baseline troponin                 levels and results should be interpreted in conjunction with the overall                 clinical context.                                 NOTE: Troponin I testing is performed using a different                 testing methodology at [facility] than at other                 [facility]. Direct result comparisons should only                 be made within the same method.  B-TYPE NATRIURETIC PEPTIDE - Abnormal     BNP                           1,144 (*)                 Narrative:    <100 pg/mL - Heart failure unlikely                100-299 pg/mL - Intermediate probability of acute heart                                failure exacerbation. Correlate with clinical                                context and patient history.                  >=300 pg/mL - Heart Failure likely. Correlate with clinical                                context and patient history.                                BNP testing is performed using different testing methodology at [facility] than at other [facility] hospitals. Direct result comparisons should only be made within the same method.                   TROPONIN SERIES- (INITIAL, 1 HR)       Narrative: The following orders were created for panel order Troponin I Series, High Sensitivity (0, 1 HR).                Procedure                               Abnormality         Status                                   ---------                               -----------         ------                                   Troponin I, High Sensiti...[[id number]]  Abnormal            Final  result                             Troponin, High Sensitivi...[[id number]]                      In process                                               Please view results for these tests on the individual orders.  SERIAL TROPONIN, 1 HOUR   XR chest 1 view   Final Result    Cardiomegaly with pulmonary vascular engorgement and small right    pleural effusion. No mariaelena pulmonary interstitial edema.          MACRO:    None          Signed by: Romaine Thakur 5/27/2025 1:57 PM    Dictation workstation:   [id number]                            Tests/Medications/Escalations of Care considered but not given: As in [facility]    Patient care discussed with: N/A  Social Determinants affecting care: N/A    Final diagnosis and disposition as documented     ED Course as of 05/27/25 1525  ------------------------------------------------------------  Time: 05/27 1345  Comment: EKG completed at 1340 shows on my interpretation a paced rhythm with a rate of 60 bpm.  No signs of STEMI.  QTc 452 ms.  By: Stanislav Gill PA-C    ------------------------------------------------------------  Diagnoses as of 05/27/25 1525  Shortness of breath  Acute on chronic congestive heart failure, unspecified heart failure type       Shared decision making was completed and determined that patient will be hospitalized. I discussed the differential; results and admit plan with the patient and/or family/friend/caregiver if present.  I emphasized the importance of hospitalization need for re-evaluation/continued monitoring/interventions. They agreed that if they feel their condition is worsening or if they have any other concern they should alert staff immediately for further assistance. I gave the patient an opportunity to ask all questions they had and answered all of them accordingly. The patient and/or family/friend/caregiver expressed understanding verbally and that they would comply.    Disposition:  Hospitalize to [facility] floor under Dr. Henrik velasquez  orders. Discussed findings and treatment done here in ED with admitting physician. It would be a risk to discharge the patient in their condition due to possibility of deterioration in their condition and the need for urgent interventions.    This note has been transcribed using voice recognition and may contain grammatical errors, misplaced words, incorrect words, incorrect phrases or other errors.         Procedure  Procedures       [1]   Past Medical History:  Diagnosis Date    Cellulitis of unspecified part of limb 06/15/2021    Cellulitis, leg    Chronic kidney disease, stage 3 unspecified (Multi) 07/31/2019    CKD (chronic kidney disease), stage III    Essential (primary) hypertension     Benign essential hypertension    Familial hypercholesterolemia 06/07/2013    Essential familial hypercholesterolemia    Other conditions influencing health status 06/07/2013    Coronary Artery Disease    Personal history of other diseases of the respiratory system 05/03/2018    History of acute sinusitis    Personal history of other mental and behavioral disorders 05/03/2018    History of depression   [2]   Past Surgical History:  Procedure Laterality Date    BACK SURGERY  06/07/2013    Lower Back Surgery    CARDIAC CATHETERIZATION N/A 3/27/2024    Procedure: Left & Right Heart Cath w Angiography & LV;  Surgeon: Rikki Briones MD;  Location: [facility] Cardiac Cath Lab;  Service: Cardiovascular;  Laterality: N/A;  3/27/24--900 am for R+LHC 06616 for CAD with angina I25.119 and PAH I27.20  ACAP; beverlyrobbie sorensen, SP    CARDIAC PACEMAKER PLACEMENT  06/07/2013    Pacemaker Placement    CORONARY ANGIOPLASTY WITH STENT PLACEMENT  06/07/2013    Cath Stent Placement   [3] No family history on file.  [4]   Social History  Tobacco Use    Smoking status: Never    Smokeless tobacco: Never   Vaping Use    Vaping status: Never Used   Substance Use Topics    Alcohol use: Never    Drug use: Never        Stanislav Gill PA-C  05/27/25 1526

## 2025-05-27 NOTE — PROGRESS NOTES
Pharmacy Medication History Review    Nadine Qureshi is a 85 y.o. female admitted for Acute on chronic congestive heart failure, unspecified heart failure type. Pharmacy reviewed the patient's [id number] medications and allergies for accuracy.    The list below reflectives the updated PTA list. Please review each medication in order reconciliation for additional clarification and justification.  Prior to Admission Medications   Prescriptions Last Dose Informant Patient Reported? Taking?   UNABLE TO FIND 5/27/2025 at 12:00 PM Family Member, Self Yes Yes   Sig: Take 1 capsule by mouth once daily. Med Name: formula #[id number] (natural supplement, antibiotic)   VALERIAN ROOT ORAL 5/26/2025 Family Member, Self Yes Yes   Sig: Take 1-2 tablets by mouth once daily. And prn muscle pains   benzonatate (Tessalon) 200 mg capsule Not Taking Self, Family Member Yes Yes   Sig: Take 1 capsule (200 mg) by mouth 3 times a day as needed for cough. Do not crush or chew.   Patient not taking: Reported on 5/27/2025   carvedilol (Coreg) 12.5 mg tablet 5/27/2025 Morning Family Member, Self Yes Yes   Sig: Take 1 tablet (12.5 mg) by mouth once daily.   cholecalciferol (Vitamin D-3) 50 mcg (2,000 unit) capsule 5/26/2025 Morning Family Member, Self Yes Yes   Sig: Take 1 capsule (2,000 Units) by mouth once daily.   clopidogrel (Plavix) 75 mg tablet 5/26/2025 Morning Family Member, Self Yes Yes   Sig: Take 1 tablet (75 mg) by mouth once daily.   cyclobenzaprine (Flexeril) 5 mg tablet Unknown Self, Family Member Yes Yes   Sig: Take 1 tablet (5 mg) by mouth as needed at bedtime for muscle spasms.   fluticasone (Flonase) 50 mcg/actuation nasal spray Unknown Family Member, Self Yes Yes   Sig: Administer 2 sprays into each nostril once daily. Shake gently. Before first use, prime pump. After use, clean tip and replace cap.   furosemide (Lasix) 40 mg tablet 5/27/2025 Morning Family Member, Self Yes Yes   Sig: Take 2 tablets (80 mg) by mouth 2 times  daily (morning and late afternoon).   glucosamine sulfate (GLUCOSAMINE ORAL) 5/27/2025 Morning Family Member, Self Yes Yes   Sig: Take 1 tablet by mouth once daily.   losartan (Cozaar) 100 mg tablet 5/27/2025 Morning Self, Family Member Yes Yes   Sig: Take 1 tablet by mouth once daily   nitroglycerin (Nitrostat) 0.4 mg SL tablet Unknown Family Member, Self Yes Yes   Sig: Place under the tongue.   potassium chloride CR 20 mEq ER tablet 5/26/2025 Morning Self, Family Member Yes Yes   Sig: Take 1 tablet by mouth once daily   sodium chloride (Ocean) 0.65 % nasal spray Unknown Family Member, Self Yes Yes   Sig: Administer 1 spray into each nostril if needed for congestion.      Facility-Administered Medications: None           The list below reflectives the updated allergy list. Please review each documented allergy for additional clarification and justification.  Allergies  Reviewed by Diamond Alfaro RN on 5/27/2025        Severity Reactions Comments    Lipitor [atorvastatin] Not Specified Unknown     Statins-hmg-coa Reductase Inhibitors Not Specified Unknown     Zocor [simvastatin] Not Specified Unknown             Below are additional concerns with the patient's PTA list.      Tigist Lacey

## 2025-05-27 NOTE — H&P
History Of Present Illness  Nadine Qureshi is a 85 y.o. female presenting with shortness of breath. She has been sob for 'awhile'. Last night it became so bad she was unable to lie back without gasping for air. PND as well recently. Her legs have been more swollen, she thinks she has gained about 4 pounds in the last week. She also admits to chest pressure, both at rest and with exertion, more frequent. No indigestion or heartburn type symptoms. She has been taking her meds. Did not notice a decrease in her urine output. She believes she had an echo in April at dr Brandt's office. No fever, chills, increased cough. She has a small fluid blister on her right leg, just started yesterday.    She has also been weak, unable even to wash dishes, and has to eat smaller amounts due to bloating    Past Medical History  Triple vessel CAD  CKD, stage 5  Pulmonary hypertension  Hypertension  Anemia  Atrial fib  Hyperlipidemia    Surgical History  C section x 2  Appendectomy  Bilateral cataract remova;  Back surgery , ruptured disk  Pacemaker  LHC, stents placed. 2     Social History  She reports that she has never smoked. She has never used smokeless tobacco. She reports that she does not drink alcohol and does not use drugs. She uses a walker to ambulate    Family History  Family History[1]     Allergies  Lipitor [atorvastatin], Statins-hmg-coa reductase inhibitors, and Zocor [simvastatin]    Review of Systems   Constitutional:  Positive for activity change, fatigue and unexpected weight change.   HENT: Negative.     Eyes: Negative.    Respiratory:  Positive for cough and shortness of breath.    Cardiovascular:  Positive for chest pain and leg swelling.   Gastrointestinal:  Positive for abdominal distention.        Feels bloated  Gets full quickly   Endocrine: Negative.    Genitourinary: Negative.    Musculoskeletal:  Positive for arthralgias and gait problem.   Skin:  Positive for wound.   Allergic/Immunologic: Negative.     Neurological:  Positive for weakness.   Hematological: Negative.    Psychiatric/Behavioral: Negative.          Physical Exam  Constitutional:       Appearance: Normal appearance.   HENT:      Head: Normocephalic.      Ears:      Comments: Slightly [place]     Nose: Nose normal.      Mouth/Throat:      Mouth: Mucous membranes are dry.   Eyes:      Extraocular Movements: Extraocular movements intact.      Pupils: Pupils are equal, round, and reactive to light.      Comments: Glasses in place   Neck:      Comments: Jvd is present, at about 65 degrees  No bruits or adenopathy appreciated  Cardiovascular:      Comments: Currently regular. 1/6 short low pitched systolic murmur  Pulmonary:      Comments: Crackles in left lung more than right  Decreased breath sounds right base  Decreased excursion. No use of accessory muscles  Abdominal:      Comments: Soft, but distended.   Bowel sounds sluggish but present  No discomfort to palpation   Musculoskeletal:      Comments: 1-2+ pitting edema of both lower extremities  No distal hair growth  Small fluid filled blister on right foreleg.  Faint distal pulses   Skin:     General: Skin is warm and dry.   Neurological:      Mental Status: She is alert and oriented to person, place, and time. Mental status is at baseline.   Psychiatric:         Mood and Affect: Mood normal.         Behavior: Behavior normal.          Results for orders placed or performed during the hospital encounter of 05/27/25 (from the past 24 hours)   CBC and Auto Differential   Result Value Ref Range    WBC 4.8 4.4 - 11.3 x10*3/uL    nRBC 0.0 0.0 - 0.0 /100 WBCs    RBC 3.45 (L) 4.00 - 5.20 x10*6/uL    Hemoglobin 9.2 (L) 12.0 - 16.0 g/dL    Hematocrit 29.7 (L) 36.0 - 46.0 %    MCV 86 80 - 100 fL    MCH 26.7 26.0 - 34.0 pg    MCHC 31.0 (L) 32.0 - 36.0 g/dL    RDW 17.2 (H) 11.5 - 14.5 %    Platelets 164 150 - 450 x10*3/uL    Neutrophils % 68.8 40.0 - 80.0 %    Immature Granulocytes %, Automated 0.2 0.0 - 0.9 %     Lymphocytes % 15.9 13.0 - 44.0 %    Monocytes % 12.4 2.0 - 10.0 %    Eosinophils % 2.1 0.0 - 6.0 %    Basophils % 0.6 0.0 - 2.0 %    Neutrophils Absolute 3.33 1.60 - 5.50 x10*3/uL    Immature Granulocytes Absolute, Automated 0.01 0.00 - 0.50 x10*3/uL    Lymphocytes Absolute 0.77 (L) 0.80 - 3.00 x10*3/uL    Monocytes Absolute 0.60 0.05 - 0.80 x10*3/uL    Eosinophils Absolute 0.10 0.00 - 0.40 x10*3/uL    Basophils Absolute 0.03 0.00 - 0.10 x10*3/uL   Comprehensive Metabolic Panel   Result Value Ref Range    Glucose 114 (H) 74 - 99 mg/dL    Sodium 139 136 - 145 mmol/L    Potassium 4.4 3.5 - 5.3 mmol/L    Chloride 102 98 - 107 mmol/L    Bicarbonate 25 21 - 32 mmol/L    Anion Gap 16 10 - 20 mmol/L    Urea Nitrogen 76 (H) 6 - 23 mg/dL    Creatinine 2.37 (H) 0.50 - 1.05 mg/dL    eGFR 20 (L) >60 mL/min/1.73m*2    Calcium 8.8 8.6 - 10.3 mg/dL    Albumin 4.1 3.4 - 5.0 g/dL    Alkaline Phosphatase 62 33 - 136 U/L    Total Protein 7.0 6.4 - 8.2 g/dL    AST 18 9 - 39 U/L    Bilirubin, Total 0.6 0.0 - 1.2 mg/dL    ALT 9 7 - 45 U/L   Magnesium   Result Value Ref Range    Magnesium 2.68 (H) 1.60 - 2.40 mg/dL   Troponin I, High Sensitivity, Initial   Result Value Ref Range    Troponin I, High Sensitivity 33 (H) 0 - 13 ng/L   B-Type Natriuretic Peptide   Result Value Ref Range    BNP 1,144 (H) 0 - 99 pg/mL   Troponin, High Sensitivity, 1 Hour   Result Value Ref Range    Troponin I, High Sensitivity 31 (H) 0 - 13 ng/L       Last Recorded Vitals  Blood pressure 114/79, pulse 67, temperature 36.4 °C (97.5 °F), resp. rate 18, height 1.524 m (5'), weight 84.4 kg (186 lb), SpO2 100%.    Relevant Results  Assessment & Plan  Acute on chronic congestive heart failure, unspecified heart failure type    Acute decompensated heart failure, diastolic in nature, but also right sided. Hx of pulm hypertension/TR. Dr Brandt has recent echo, will let us know about this. Not requiring any oxygen at this time. Unclear what precipitated this episode.  Continue lasix iv, but at higher dose than oral at home dose  Chest pain, anginal in nature, accelerating. She has known CAD, severe triple vessel disease. Cards on consult, will add imdur, await further input. Tele. She is on plavix, no statin due to intolerance  Atrial fib.   CKD, stage 4. Creat is up from previous drawn here. Possibly due to lower flow state. Monitor while diuresing  Hyperlipidemia. Lipid panel in am. Could consider zetia, vascepa, fish oil etc.  Hx Hypertension. Well controlled  Anemia. Drop in hgb since previous. Possibly related to ckd. Will check iron studies and stool. May be contributing to her dyspnea and weakness.   DVT prophylaxis    I spent 37 minutes in the professional and overall care of this patient.      Jyotsna Chester MD         [1] No family history on file.

## 2025-05-27 NOTE — PROGRESS NOTES
05/27/25 1509   Discharge Planning   Living Arrangements Children;Family members  (Home with daughter and son-in-law)   Support Systems Children;Family members   Assistance Needed A&OX4; needs assist for bathing only and uses walker to ambulate; doesn't drive; room air baseline and currently room air; PCP Dr Sherrie Ortiz; on Plavix prior to hospitalization   Type of Residence Private residence   Number of Stairs to Enter Residence 4   Number of Stairs Within Residence 0   Do you have animals or pets at home? No   Who is requesting discharge planning? Provider   Expected Discharge Disposition Home  (DC dispo is pending cardiac workup and diuresis but likely home no needs)   Does the patient need discharge transport arranged? Yes   RoundTrip coordination needed? Yes   Has discharge transport been arranged? No   Financial Resource Strain   How hard is it for you to pay for the very basics like food, housing, medical care, and heating? Not hard   Housing Stability   In the last 12 months, was there a time when you were not able to pay the mortgage or rent on time? N   In the past 12 months, how many times have you moved where you were living? 0   At any time in the past 12 months, were you homeless or living in a shelter (including now)? N   Transportation Needs   In the past 12 months, has lack of transportation kept you from medical appointments or from getting medications? no   In the past 12 months, has lack of transportation kept you from meetings, work, or from getting things needed for daily living? No   Intensity of Service   Intensity of Service 0-30 min     05/27/2025 1511pm  Spoke with patient and patient's daughter bedside in ED

## 2025-05-28 LAB
ANION GAP SERPL CALC-SCNC: 17 MMOL/L (ref 10–20)
ATRIAL RATE: 234 BPM
ATRIAL RATE: 63 BPM
ATRIAL RATE: 66 BPM
BUN SERPL-MCNC: 75 MG/DL (ref 6–23)
CALCIUM SERPL-MCNC: 9.1 MG/DL (ref 8.6–10.3)
CHLORIDE SERPL-SCNC: 100 MMOL/L (ref 98–107)
CHOLEST SERPL-MCNC: 103 MG/DL (ref 0–199)
CHOLESTEROL/HDL RATIO: 2.5
CO2 SERPL-SCNC: 27 MMOL/L (ref 21–32)
CREAT SERPL-MCNC: 2.15 MG/DL (ref 0.5–1.05)
EGFRCR SERPLBLD CKD-EPI 2021: 22 ML/MIN/1.73M*2
GLUCOSE SERPL-MCNC: 101 MG/DL (ref 74–99)
HDLC SERPL-MCNC: 41.3 MG/DL
IRON SATN MFR SERPL: 9 % (ref 25–45)
IRON SERPL-MCNC: 34 UG/DL (ref 35–150)
LDLC SERPL CALC-MCNC: 46 MG/DL
NON HDL CHOLESTEROL: 62 MG/DL (ref 0–149)
POTASSIUM SERPL-SCNC: 4.1 MMOL/L (ref 3.5–5.3)
Q ONSET: 195 MS
Q ONSET: 196 MS
Q ONSET: 196 MS
QRS COUNT: 10 BEATS
QRS DURATION: 156 MS
QRS DURATION: 160 MS
QRS DURATION: 162 MS
QT INTERVAL: 438 MS
QT INTERVAL: 442 MS
QT INTERVAL: 452 MS
QTC CALCULATION(BAZETT): 438 MS
QTC CALCULATION(BAZETT): 442 MS
QTC CALCULATION(BAZETT): 452 MS
QTC FREDERICIA: 438 MS
QTC FREDERICIA: 442 MS
QTC FREDERICIA: 452 MS
R AXIS: -56 DEGREES
R AXIS: -59 DEGREES
R AXIS: -63 DEGREES
SODIUM SERPL-SCNC: 140 MMOL/L (ref 136–145)
T AXIS: 116 DEGREES
T AXIS: 118 DEGREES
T AXIS: 124 DEGREES
T OFFSET: 415 MS
T OFFSET: 417 MS
T OFFSET: 421 MS
T4 FREE SERPL-MCNC: 1 NG/DL (ref 0.61–1.12)
TIBC SERPL-MCNC: 394 UG/DL (ref 240–445)
TRIGL SERPL-MCNC: 77 MG/DL (ref 0–149)
TSH SERPL-ACNC: 4.11 MIU/L (ref 0.44–3.98)
UIBC SERPL-MCNC: 360 UG/DL (ref 110–370)
VENTRICULAR RATE: 60 BPM
VLDL: 15 MG/DL (ref 0–40)

## 2025-05-28 PROCEDURE — 36415 COLL VENOUS BLD VENIPUNCTURE: CPT | Performed by: INTERNAL MEDICINE

## 2025-05-28 PROCEDURE — 1200000002 HC GENERAL ROOM WITH TELEMETRY DAILY

## 2025-05-28 PROCEDURE — 80061 LIPID PANEL: CPT | Performed by: INTERNAL MEDICINE

## 2025-05-28 PROCEDURE — 2500000001 HC RX 250 WO HCPCS SELF ADMINISTERED DRUGS (ALT 637 FOR MEDICARE OP): Performed by: NURSE PRACTITIONER

## 2025-05-28 PROCEDURE — 99232 SBSQ HOSP IP/OBS MODERATE 35: CPT | Performed by: INTERNAL MEDICINE

## 2025-05-28 PROCEDURE — 2500000004 HC RX 250 GENERAL PHARMACY W/ HCPCS (ALT 636 FOR OP/ED): Performed by: INTERNAL MEDICINE

## 2025-05-28 PROCEDURE — 83540 ASSAY OF IRON: CPT | Performed by: INTERNAL MEDICINE

## 2025-05-28 PROCEDURE — 2500000001 HC RX 250 WO HCPCS SELF ADMINISTERED DRUGS (ALT 637 FOR MEDICARE OP): Performed by: INTERNAL MEDICINE

## 2025-05-28 PROCEDURE — 80048 BASIC METABOLIC PNL TOTAL CA: CPT | Performed by: INTERNAL MEDICINE

## 2025-05-28 PROCEDURE — 84443 ASSAY THYROID STIM HORMONE: CPT | Performed by: INTERNAL MEDICINE

## 2025-05-28 PROCEDURE — 84439 ASSAY OF FREE THYROXINE: CPT | Performed by: INTERNAL MEDICINE

## 2025-05-28 RX ORDER — TRAZODONE HYDROCHLORIDE 50 MG/1
50 TABLET ORAL ONCE
Status: COMPLETED | OUTPATIENT
Start: 2025-05-28 | End: 2025-05-28

## 2025-05-28 RX ADMIN — FUROSEMIDE 80 MG: 10 INJECTION, SOLUTION INTRAMUSCULAR; INTRAVENOUS at 09:03

## 2025-05-28 RX ADMIN — PANTOPRAZOLE SODIUM 40 MG: 40 TABLET, DELAYED RELEASE ORAL at 06:11

## 2025-05-28 RX ADMIN — Medication 50 MCG: at 09:02

## 2025-05-28 RX ADMIN — ISOSORBIDE MONONITRATE 30 MG: 30 TABLET, EXTENDED RELEASE ORAL at 09:02

## 2025-05-28 RX ADMIN — CLOPIDOGREL 75 MG: 75 TABLET ORAL at 09:02

## 2025-05-28 RX ADMIN — ASPIRIN 81 MG: 81 TABLET, DELAYED RELEASE ORAL at 09:02

## 2025-05-28 RX ADMIN — LOSARTAN POTASSIUM 100 MG: 50 TABLET, FILM COATED ORAL at 09:03

## 2025-05-28 RX ADMIN — FUROSEMIDE 80 MG: 10 INJECTION, SOLUTION INTRAMUSCULAR; INTRAVENOUS at 17:40

## 2025-05-28 RX ADMIN — ACETAMINOPHEN 650 MG: 325 TABLET, FILM COATED ORAL at 21:26

## 2025-05-28 RX ADMIN — TRAZODONE HYDROCHLORIDE 50 MG: 50 TABLET ORAL at 21:10

## 2025-05-28 RX ADMIN — CARVEDILOL 12.5 MG: 12.5 TABLET, FILM COATED ORAL at 09:02

## 2025-05-28 ASSESSMENT — ENCOUNTER SYMPTOMS
CHILLS: 0
ABDOMINAL PAIN: 0
SHORTNESS OF BREATH: 1
FEVER: 0
ABDOMINAL DISTENTION: 1
PALPITATIONS: 0
WEAKNESS: 0
COUGH: 0
DIZZINESS: 0

## 2025-05-28 ASSESSMENT — COGNITIVE AND FUNCTIONAL STATUS - GENERAL
MOBILITY SCORE: 21
DAILY ACTIVITIY SCORE: 24
WALKING IN HOSPITAL ROOM: A LITTLE
CLIMB 3 TO 5 STEPS WITH RAILING: A LITTLE
STANDING UP FROM CHAIR USING ARMS: A LITTLE

## 2025-05-28 ASSESSMENT — PAIN SCALES - GENERAL
PAINLEVEL_OUTOF10: 0 - NO PAIN
PAINLEVEL_OUTOF10: 0 - NO PAIN

## 2025-05-28 ASSESSMENT — ACTIVITIES OF DAILY LIVING (ADL): LACK_OF_TRANSPORTATION: NO

## 2025-05-28 NOTE — PROGRESS NOTES
05/28/25 0815   Discharge Planning   Living Arrangements Children;Family members  (Home with daughter and son-in-law)   Support Systems Children;Family members   Assistance Needed A&OX4; needs assist for bathing only and uses walker to ambulate; doesn't drive; room air baseline and currently room air; PCP Dr Sherrie Ortiz; on Plavix prior to hospitalization   Type of Residence Private residence   Number of Stairs to Enter Residence 4   Number of Stairs Within Residence 0   Do you have animals or pets at home? No   Who is requesting discharge planning? Provider   Expected Discharge Disposition Home  (DC dispo is pending cardiac workup and diuresis but likely home no needs)   Financial Resource Strain   How hard is it for you to pay for the very basics like food, housing, medical care, and heating? Not hard   Housing Stability   In the last 12 months, was there a time when you were not able to pay the mortgage or rent on time? N   In the past 12 months, how many times have you moved where you were living? 0   At any time in the past 12 months, were you homeless or living in a shelter (including now)? N   Transportation Needs   In the past 12 months, has lack of transportation kept you from medical appointments or from getting medications? no   In the past 12 months, has lack of transportation kept you from meetings, work, or from getting things needed for daily living? No

## 2025-05-28 NOTE — CONSULTS
Inpatient consult to Cardiology  Consult performed by: ODALYS Sánchez-CNP  Consult ordered by: Jyotsna Chester MD  Reason for consult: CHF          History Of Present Illness  Nadine Qureshi is a 85 y.o. female presenting with complaints of shortness of breath, abd distention, orthopnea, leg swelling, and weight gain. She states she thinks it started over a month ago and has just gotten worse over the last week. She denies any chest pain, dizziness, or palpitations.     Lab work in the ER showed glucose 114, sodium 139, potassium 4.4, BUN/Cr 76/2.37, magnesium 2.68, BNP 1144, troponin 33, 31, WBC 4.8, H&H 9.2/29.7, CXR showed cardiomegaly with pulmonary vascular engorgement and small right pleural effusion.    EKG showed ventricular paced.       Past Medical History  Medical History[1]  CAD s/p PCI/JOSE to PDA and ostial RCA, SSS s/p medtronic PCM, HTN, chronic afib refuses AC, refuses watchman or ablation, carotid artery stenosis, hyperlipidemia, depression, anemia, obesity, severe pulmonary HTN     Surgical History  Surgical History[2]     Social History  She reports that she has never smoked. She has never used smokeless tobacco. She reports that she does not drink alcohol and does not use drugs.    Family History  Family History[3]     Allergies  Lipitor [atorvastatin], Statins-hmg-coa reductase inhibitors, and Zocor [simvastatin]    Review of Systems  Review of Systems   Constitutional:  Negative for chills and fever.   Respiratory:  Positive for shortness of breath. Negative for cough.    Cardiovascular:  Positive for leg swelling. Negative for chest pain and palpitations.   Gastrointestinal:  Positive for abdominal distention. Negative for abdominal pain.   Musculoskeletal:  Negative for gait problem.   Neurological:  Negative for dizziness and weakness.   All other systems reviewed and are negative.         Physical Exam  Constitutional: Well developed, awake/alert/oriented x3, no distress, alert  and cooperative  Eyes: PERRL, EOMI, clear sclera  ENMT: mucous membranes moist, no apparent injury, no lesions seen  Head/Neck: Neck supple, no apparent injury, thyroid without mass or tenderness, No JVD, trachea midline, no bruits  Respiratory/Thorax: Patent airways, rales bilat breath sounds with good chest expansion, thorax symmetric  Cardiovascular: Regular, rate and rhythm, no murmurs, 2+ equal pulses of the extremities, normal S 1and S 2  Gastrointestinal: Nondistended, soft, non-tender, no rebound tenderness or guarding, no masses palpable, no organomegaly, +BS, no bruits  Musculoskeletal: ROM intact, no joint swelling, normal strength  Extremities: BLE swollen and taunt  Neurological: alert and oriented x3, intact senses, motor, response and reflexes, normal strength  Lymphatic: No significant lymphadenopathy  Psychological: Appropriate mood and behavior  Skin: Warm and dry, no lesions, no rashes       Last Recorded Vitals  Blood pressure 139/73, pulse 60, temperature 36.5 °C (97.7 °F), temperature source Temporal, resp. rate 16, height 1.524 m (5'), weight 87 kg (191 lb 12.8 oz), SpO2 (!) 64%.    Medications  Scheduled medications  Scheduled Medications[4]  Continuous medications  Continuous Medications[5]  PRN medications  PRN Medications[6]    Relevant Results  Results for orders placed or performed during the hospital encounter of 05/27/25 (from the past 24 hours)   CBC and Auto Differential   Result Value Ref Range    WBC 4.8 4.4 - 11.3 x10*3/uL    nRBC 0.0 0.0 - 0.0 /100 WBCs    RBC 3.45 (L) 4.00 - 5.20 x10*6/uL    Hemoglobin 9.2 (L) 12.0 - 16.0 g/dL    Hematocrit 29.7 (L) 36.0 - 46.0 %    MCV 86 80 - 100 fL    MCH 26.7 26.0 - 34.0 pg    MCHC 31.0 (L) 32.0 - 36.0 g/dL    RDW 17.2 (H) 11.5 - 14.5 %    Platelets 164 150 - 450 x10*3/uL    Neutrophils % 68.8 40.0 - 80.0 %    Immature Granulocytes %, Automated 0.2 0.0 - 0.9 %    Lymphocytes % 15.9 13.0 - 44.0 %    Monocytes % 12.4 2.0 - 10.0 %     Eosinophils % 2.1 0.0 - 6.0 %    Basophils % 0.6 0.0 - 2.0 %    Neutrophils Absolute 3.33 1.60 - 5.50 x10*3/uL    Immature Granulocytes Absolute, Automated 0.01 0.00 - 0.50 x10*3/uL    Lymphocytes Absolute 0.77 (L) 0.80 - 3.00 x10*3/uL    Monocytes Absolute 0.60 0.05 - 0.80 x10*3/uL    Eosinophils Absolute 0.10 0.00 - 0.40 x10*3/uL    Basophils Absolute 0.03 0.00 - 0.10 x10*3/uL   Comprehensive Metabolic Panel   Result Value Ref Range    Glucose 114 (H) 74 - 99 mg/dL    Sodium 139 136 - 145 mmol/L    Potassium 4.4 3.5 - 5.3 mmol/L    Chloride 102 98 - 107 mmol/L    Bicarbonate 25 21 - 32 mmol/L    Anion Gap 16 10 - 20 mmol/L    Urea Nitrogen 76 (H) 6 - 23 mg/dL    Creatinine 2.37 (H) 0.50 - 1.05 mg/dL    eGFR 20 (L) >60 mL/min/1.73m*2    Calcium 8.8 8.6 - 10.3 mg/dL    Albumin 4.1 3.4 - 5.0 g/dL    Alkaline Phosphatase 62 33 - 136 U/L    Total Protein 7.0 6.4 - 8.2 g/dL    AST 18 9 - 39 U/L    Bilirubin, Total 0.6 0.0 - 1.2 mg/dL    ALT 9 7 - 45 U/L   Magnesium   Result Value Ref Range    Magnesium 2.68 (H) 1.60 - 2.40 mg/dL   Troponin I, High Sensitivity, Initial   Result Value Ref Range    Troponin I, High Sensitivity 33 (H) 0 - 13 ng/L   B-Type Natriuretic Peptide   Result Value Ref Range    BNP 1,144 (H) 0 - 99 pg/mL   ECG 12 lead   Result Value Ref Range    Ventricular Rate 60 BPM    Atrial Rate 63 BPM    QRS Duration 156 ms    QT Interval 438 ms    QTC Calculation(Bazett) 438 ms    R Axis -56 degrees    T Axis 124 degrees    QRS Count 10 beats    Q Onset 196 ms    T Offset 415 ms    QTC Fredericia 438 ms   ECG 12 lead   Result Value Ref Range    Ventricular Rate 60 BPM    Atrial Rate 66 BPM    QRS Duration 162 ms    QT Interval 452 ms    QTC Calculation(Bazett) 452 ms    R Axis -59 degrees    T Axis 118 degrees    QRS Count 10 beats    Q Onset 195 ms    T Offset 421 ms    QTC Fredericia 452 ms   Troponin, High Sensitivity, 1 Hour   Result Value Ref Range    Troponin I, High Sensitivity 31 (H) 0 - 13 ng/L   ECG  12 lead   Result Value Ref Range    Ventricular Rate 60 BPM    Atrial Rate 234 BPM    QRS Duration 160 ms    QT Interval 442 ms    QTC Calculation(Bazett) 442 ms    R Axis -63 degrees    T Axis 116 degrees    QRS Count 10 beats    Q Onset 196 ms    T Offset 417 ms    QTC Fredericia 442 ms   Urinalysis with Reflex Microscopic   Result Value Ref Range    Color, Urine Colorless (N) Light-Yellow, Yellow, Dark-Yellow    Appearance, Urine Clear Clear    Specific Gravity, Urine 1.009 1.005 - 1.035    pH, Urine 5.0 5.0, 5.5, 6.0, 6.5, 7.0, 7.5, 8.0    Protein, Urine NEGATIVE NEGATIVE, 10 (TRACE), 20 (TRACE) mg/dL    Glucose, Urine Normal Normal mg/dL    Blood, Urine NEGATIVE NEGATIVE mg/dL    Ketones, Urine NEGATIVE NEGATIVE mg/dL    Bilirubin, Urine NEGATIVE NEGATIVE mg/dL    Urobilinogen, Urine Normal Normal mg/dL    Nitrite, Urine NEGATIVE NEGATIVE    Leukocyte Esterase, Urine NEGATIVE NEGATIVE   Basic metabolic panel   Result Value Ref Range    Glucose 101 (H) 74 - 99 mg/dL    Sodium 140 136 - 145 mmol/L    Potassium 4.1 3.5 - 5.3 mmol/L    Chloride 100 98 - 107 mmol/L    Bicarbonate 27 21 - 32 mmol/L    Anion Gap 17 10 - 20 mmol/L    Urea Nitrogen 75 (H) 6 - 23 mg/dL    Creatinine 2.15 (H) 0.50 - 1.05 mg/dL    eGFR 22 (L) >60 mL/min/1.73m*2    Calcium 9.1 8.6 - 10.3 mg/dL   Lipid Panel   Result Value Ref Range    Cholesterol 103 0 - 199 mg/dL    HDL-Cholesterol 41.3 mg/dL    Cholesterol/HDL Ratio 2.5     LDL Calculated 46 <=99 mg/dL    VLDL 15 0 - 40 mg/dL    Triglycerides 77 0 - 149 mg/dL    Non HDL Cholesterol 62 0 - 149 mg/dL   TSH with reflex to Free T4 if abnormal   Result Value Ref Range    Thyroid Stimulating Hormone 4.11 (H) 0.44 - 3.98 mIU/L   Iron and TIBC   Result Value Ref Range    Iron 34 (L) 35 - 150 ug/dL    UIBC 360 110 - 370 ug/dL    TIBC 394 240 - 445 ug/dL    % Saturation 9 (L) 25 - 45 %   Thyroxine, Free   Result Value Ref Range    Thyroxine, Free 1.00 0.61 - 1.12 ng/dL       XR chest 1 view   Final  Result   Cardiomegaly with pulmonary vascular engorgement and small right   pleural effusion. No mariaelena pulmonary interstitial edema.        MACRO:   None        Signed by: Romaine Thakur 5/27/2025 1:57 PM   Dictation workstation:   [id number]          Transthoracic Echo (TTE) Complete  Result Date: 8/2/2024   [facility], [address] [place] [address]               Tel [phone] and Fax [phone] TRANSTHORACIC ECHOCARDIOGRAM REPORT  Patient Name:      LETICIA BROWNING      Reading Physician:    [id number] Rikki Briones MD Study Date:        8/2/2024             Ordering Provider:    [id number] KEY DARLING MRN/PID:           [id number]             Fellow: Accession#:        [id number]         Nurse:                Melissa Gay RN Date of Birth/Age: 1940 / 84 years Sonographer:          Wendy Guevara RDCS Gender:            F                    Additional Staff: Height:            152.40 cm            Admit Date:           8/2/2024 Weight:            84.82 kg             Admission Status:     Inpatient -                                                               Routine BSA / BMI:         1.81 m2 / 36.52      Encounter#:           [id number]                    kg/m2 Blood Pressure:    141/83 mmHg          Department Location:  [facility] Non                                                               Invasive Study Type:    TRANSTHORACIC ECHO (TTE) COMPLETE Diagnosis/ICD: Longstanding persistent AFib-I48.11; Pulmonary hypertension,                unspecified-I27.20; Acute on chronic diastolic (congestive) heart                failure (CHF)-I50.33 Indication:    Atrial Fibrillation, Congestive Heart Failure, PHTN CPT Code:      Echo Complete w Full Doppler-24223 Patient History: Pacer/Defib:        Permanent pacemaker Pertinent History: HTN, Hyperlipidemia, CAD, A-Fib and SSS. chronic kidney                    disease. Study Detail: The following Echo studies were performed: 2D, M-Mode, Doppler and               color flow. Technically challenging study due to prominent lung               artifact and poor acoustic windows. Definity used as a contrast               agent for endocardial border definition. Total contrast used for               this procedure was 2.0 mL via IV push. The patient was awake.  PHYSICIAN INTERPRETATION: Left Ventricle: The left ventricular systolic function is normal, with a visually estimated ejection fraction of 55-60%. Wall motion is abnormal. The left ventricular cavity size is normal. There is mild concentric left ventricular hypertrophy. Spectral Doppler shows a restrictive pattern of left ventricular diastolic filling. Left Atrium: The left atrium is moderately dilated. Right Ventricle: The right ventricle is mildly enlarged. There is mildly reduced right ventricular systolic function. Right Atrium: The right atrium is moderately dilated. Aortic Valve: The aortic valve is trileaflet. There is mild to moderate aortic valve cusp calcification. The aortic valve dimensionless index is 0.68. There is no evidence of aortic valve regurgitation. The peak instantaneous gradient of the aortic valve is 4.8 mmHg. The mean gradient of the aortic valve is 3.0 mmHg. Mitral Valve: The mitral valve is normal in structure. There is mild to moderate mitral valve regurgitation. Tricuspid Valve: The tricuspid valve is structurally normal. There is moderate tricuspid regurgitation. Pulmonic Valve: The pulmonic valve is structurally normal. There is physiologic pulmonic valve regurgitation. Pericardium: There is no pericardial effusion noted. Aorta: The aortic root is normal. Pulmonary Artery: The tricuspid regurgitant velocity is 3.73 m/s, and with an estimated right atrial pressure of 3 mmHg,  the estimated pulmonary artery pressure is moderately elevated with the RVSP at 58.7 mmHg. Pulmonary Veins: There is blunted systolic flow in the pulmonary veins. Systemic Veins: The inferior vena cava appears mildly dilated. There is poor inspiratory collapse of the IVC (less than 50%), consistent with elevated right atrial pressure.  CONCLUSIONS:  1. The left ventricular systolic function is normal, with a visually estimated ejection fraction of 55-60%.  2. Spectral Doppler shows a restrictive pattern of left ventricular diastolic filling.  3. Mildly enlarged right ventricle.  4. There is mildly reduced right ventricular systolic function.  5. The left atrium is moderately dilated.  6. The right atrium is moderately dilated.  7. Mild to moderate mitral valve regurgitation.  8. Moderate tricuspid regurgitation visualized.  9. Moderately elevated pulmonary artery pressure. CVP is elevated. QUANTITATIVE DATA SUMMARY: 2D MEASUREMENTS:                           Normal Ranges: Ao Root d:     3.10 cm    (2.0-3.7cm) IVSd:          1.23 cm    (0.6-1.1cm) LVPWd:         1.25 cm    (0.6-1.1cm) LVIDd:         4.23 cm    (3.9-5.9cm) LVIDs:         3.51 cm LV Mass Index: 104.2 g/m2 LV % FS        17.0 % LA VOLUME:                               Normal Ranges: LA Vol A4C:        88.5 ml    (22+/-6mL/m2) LA Vol A2C:        80.0 ml LA Vol BP:         85.4 ml LA Vol Index A4C:  48.8ml/m2 LA Vol Index A2C:  44.1 ml/m2 LA Vol Index BP:   47.1 ml/m2 LA Area A4C:       25.3 cm2 LA Area A2C:       23.7 cm2 LA Major Axis A4C: 6.2 cm LA Major Axis A2C: 6.0 cm LA Volume Index:   44.2 ml/m2 LA Vol A4C:        81.9 ml LA Vol A2C:        76.6 ml RA VOLUME BY A/L METHOD:                       Normal Ranges: RA Area A4C: 26.5 cm2 M-MODE MEASUREMENTS:                  Normal Ranges: Ao Root: 3.10 cm (2.0-3.7cm) AoV Exc: 2.00 cm (1.5-2.5cm) LAs:     4.30 cm (2.7-4.0cm) AORTA MEASUREMENTS:                      Normal Ranges: AoV Exc:     2.00 cm  (1.5-2.5cm) Ao Sinus, d: 3.10 cm (2.1-3.5cm) Asc Ao, d:   3.00 cm (2.1-3.4cm) LV SYSTOLIC FUNCTION BY 2D PLANIMETRY (MOD):                      Normal Ranges: EF-A4C View:    41 % (>=55%) EF-A2C View:    36 % EF-Biplane:     39 % EF-Visual:      58 % LV EF Reported: 58 % LV DIASTOLIC FUNCTION:                             Normal Ranges: MV Peak E:      0.97 m/s    (0.7-1.2 m/s) MV Peak A:      0.24 m/s    (0.42-0.7 m/s) E/A Ratio:      4.05        (1.0-2.2) MV e'           0.118 m/s   (>8.0) MV lateral e'   0.15 m/s MV medial e'    0.09 m/s MV A Dur:       151.00 msec E/e' Ratio:     8.24        (<8.0) PulmV Sys Víctor:  24.00 cm/s PulmV Zapata Víctor: 50.10 cm/s PulmV S/D Víctor:  0.50 MITRAL VALVE:                 Normal Ranges: MV DT: 130 msec (150-240msec) MITRAL INSUFFICIENCY:                           Normal Ranges: PISA Radius:  0.4 cm MR VTI:       189.00 cm MR Vmax:      524.00 cm/s MR Alias Víctor: 35.1 cm/s MR Volume:    12.73 ml MR Flow Rt:   35.29 ml/s MR EROA:      0.07 cm2 AORTIC VALVE:                                   Normal Ranges: AoV Vmax:                1.10 m/s (<=1.7m/s) AoV Peak P.8 mmHg (<20mmHg) AoV Mean PG:             3.0 mmHg (1.7-11.5mmHg) LVOT Max Víctor:            0.67 m/s (<=1.1m/s) AoV VTI:                 22.50 cm (18-25cm) LVOT VTI:                15.20 cm LVOT Diameter:           2.00 cm  (1.8-2.4cm) AoV Area, VTI:           2.12 cm2 (2.5-5.5cm2) AoV Area,Vmax:           1.92 cm2 (2.5-4.5cm2) AoV Dimensionless Index: 0.68  RIGHT VENTRICLE: TAPSE: 15.7 mm RV s'  0.08 m/s TRICUSPID VALVE/RVSP:                             Normal Ranges: Peak TR Velocity: 3.73 m/s RV Syst Pressure: 58.7 mmHg (< 30mmHg) IVC Diam:         2.32 cm PULMONIC VALVE:                      Normal Ranges: PV Max Víctor: 0.7 m/s  (0.6-0.9m/s) PV Max P.9 mmHg Pulmonary Veins: PulmV Zapata Víctor: 50.10 cm/s PulmV S/D Víctor:  0.50 PulmV Sys Víctor:  24.00 cm/s  [id number] Rikki Briones MD Electronically signed on  8/2/2024 at 7:15:29 PM  ** Final **          Assessment/Plan   Echocardiogram from April 2025: LVEF 50%, mild LVH, mod biatrial enlargement, mod MR, mod to severe TR, diastolic dysfunction, mod to severe pulm HTN      #Acute on chronic diastolic congestive heart failure  -BNP 1144  -CXR showed cardiomegaly with pulmonary vascular engorgement and small right pleural effusion  -I reviewed the Echocardiogram from above  -Strict I & Os  -Daily weights  -2gm na diet  -1500mL fluid restriction   -Cont Lasix IV BID  -Did not like the way Jardiance made her feel  -Cont Imdur, losartan, and coreg     #Elevated Troponin - Non MI  #Coronary artery disease  -Hx of PCI/JOSE to PDA and ostial RCA  -Troponin 33, 31  -I reviewed the EKG, ventricular paced rhythm  -CXR showed CHF  -I reviewed the echocardiogram from above  -[facility] March [facility] reviewed, triple vessel disease   -Denies chest pain  -Continue medical management of carvedilol and clopidogrel  -Is unable to tolerate statins, can discuss starting PCSK9 inhibitors outpatient     # Moderate to severe pulmonary Hypertension  - Echocardiogram as above  - right heart cath reviewed as above, mPAP 45-50 mmHg     # Persistent atrial fibrillation  - I reviewed the 12 lead ECG, ventricular paced  - Not on any current anticoagulant, refused  - Refused watchman or RFA/PVI  - Cont Carvedilol 12.5 mg     # Hypertension  - continue meds  - blood pressure stable     # Acute on chronic kidney disease  - creatinine 2.37->2.15  - Cont meds and monitor kidney function closely     # Sick sinus syndrome s/p Medtronic pacemaker  - Personally interrogated last month, reviewed, normal functioning      ODALYS Sánchez-CNP         [1]   Past Medical History:  Diagnosis Date    Cellulitis of unspecified part of limb 06/15/2021    Cellulitis, leg    Chronic kidney disease, stage 3 unspecified (Multi) 07/31/2019    CKD (chronic kidney disease), stage III    Essential (primary) hypertension      Benign essential hypertension    Familial hypercholesterolemia 06/07/2013    Essential familial hypercholesterolemia    Other conditions influencing health status 06/07/2013    Coronary Artery Disease    Personal history of other diseases of the respiratory system 05/03/2018    History of acute sinusitis    Personal history of other mental and behavioral disorders 05/03/2018    History of depression   [2]   Past Surgical History:  Procedure Laterality Date    BACK SURGERY  06/07/2013    Lower Back Surgery    CARDIAC CATHETERIZATION N/A 3/27/2024    Procedure: Left & Right Heart Cath w Angiography & LV;  Surgeon: Rikki Briones MD;  Location: [facility] Cardiac Cath Lab;  Service: Cardiovascular;  Laterality: N/A;  3/27/24--900 am for R+LHC 54442 for CAD with angina I25.119 and PAH I27.20  ACAP; beverly aid, SP    CARDIAC PACEMAKER PLACEMENT  06/07/2013    Pacemaker Placement    CORONARY ANGIOPLASTY WITH STENT PLACEMENT  06/07/2013    Cath Stent Placement   [3] No family history on file.  [4] aspirin, 81 mg, oral, Daily  carvedilol, 12.5 mg, oral, Daily  cholecalciferol, 50 mcg, oral, Daily  clopidogrel, 75 mg, oral, Daily  furosemide, 80 mg, intravenous, q12h  heparin, 5,000 Units, subcutaneous, q8h ADIS  isosorbide mononitrate ER, 30 mg, oral, Daily  losartan, 100 mg, oral, Daily  pantoprazole, 40 mg, oral, Daily before breakfast  [5]    [6] PRN medications: acetaminophen, cyclobenzaprine, melatonin, nitroglycerin, sodium chloride

## 2025-05-28 NOTE — CARE PLAN
The patient's goals for the shift include  use call light for assistance     The clinical goals for the shift include monitor vitals

## 2025-05-28 NOTE — CARE PLAN
The patient's goals for the shift include  rest    The clinical goals for the shift include pt will remain above 90%

## 2025-05-28 NOTE — PROGRESS NOTES
Patient: Nadine Qureshi  Room/bed: 137/137-A  Admitted on: 5/27/2025    Age: 85 y.o.   Gender: female  Code Status:  DNR and No Intubation and No ICU   Admitting Dx: Shortness of breath [R06.02]  Acute on chronic congestive heart failure, unspecified heart failure type [I50.9]    MRN: [id number]  PCP: Sherrie Ortiz MD       Subjective   Feels better. Urinating frequently. No issues with breathing over the weekend . Has missed the hat in toilet and at times it has not been there.     Objective    Physical Exam  Constitutional:       Appearance: Normal appearance.   HENT:      Head: Normocephalic.      Nose: Nose normal.      Mouth/Throat:      Mouth: Mucous membranes are moist.   Eyes:      Extraocular Movements: Extraocular movements intact.      Pupils: Pupils are equal, round, and reactive to light.   Cardiovascular:      Rate and Rhythm: Normal rate. Rhythm irregular.      Pulses: Normal pulses.   Pulmonary:      Comments: Improved air exchange. No wheezing. Easy respirations  Abdominal:      Comments: Softer, less distended .  Bowel sounds present   Musculoskeletal:      Comments: Blister remains on right foreleg  Pitting edema bilaterally.   No distal hair growth   Skin:     General: Skin is warm and dry.   Neurological:      General: No focal deficit present.      Mental Status: She is alert and oriented to person, place, and time.   Psychiatric:         Mood and Affect: Mood normal.         Behavior: Behavior normal.        Temp:  [36.4 °C (97.5 °F)-36.9 °C (98.4 °F)] 36.5 °C (97.7 °F)  Heart Rate:  [60-67] 60  Resp:  [16-21] 16  BP: (112-147)/() 139/73    Vitals:    05/28/25 0542   Weight: 87 kg (191 lb 12.8 oz)           I/Os    Intake/Output Summary (Last 24 hours) at 5/28/2025 1132  Last data filed at 5/28/2025 0900  Gross per 24 hour   Intake 758 ml   Output --   Net 758 ml       Labs:   Results from last 72 hours   Lab Units 05/28/25  0546 05/27/25  1330   SODIUM mmol/L 140 139  "  POTASSIUM mmol/L 4.1 4.4   CHLORIDE mmol/L 100 102   CO2 mmol/L 27 25   BUN mg/dL 75* 76*   CREATININE mg/dL 2.15* 2.37*   GLUCOSE mg/dL 101* 114*   CALCIUM mg/dL 9.1 8.8   ANION GAP mmol/L 17 16   EGFR mL/min/1.73m*2 22* 20*      Results from last 72 hours   Lab Units 05/27/25  1330   WBC AUTO x10*3/uL 4.8   HEMOGLOBIN g/dL 9.2*   HEMATOCRIT % 29.7*   PLATELETS AUTO x10*3/uL 164   NEUTROS PCT AUTO % 68.8   LYMPHS PCT AUTO % 15.9   MONOS PCT AUTO % 12.4   EOS PCT AUTO % 2.1      Lab Results   Component Value Date    CALCIUM 9.1 05/28/2025    PHOS 4.0 08/04/2024      Lab Results   Component Value Date    CRP 0.14 09/13/2022      [unfilled]     Micro/ID:   No results found for the last 90 days.                   No lab exists for component: \"AGALPCRNB\"   .ID  No results found for: \"URINECULTURE\", \"BLOODCULT\", \"CSFCULTSMEAR\"    Images:  ECG 12 lead  Ventricular-paced rhythm  Abnormal ECG  When compared with ECG of 21-AUG-2024 01:38,  Vent. rate has decreased BY  10 BPM  ECG 12 lead  Ventricular-paced rhythm  Abnormal ECG  When compared with ECG of 27-MAY-2025 13:40, (unconfirmed)  No significant change was found  ECG 12 lead  Ventricular-paced rhythm  Abnormal ECG  When compared with ECG of 27-MAY-2025 13:40, (unconfirmed)  No significant change was found       Meds    Scheduled medications  Scheduled Medications[1]  Continuous medications  Continuous Medications[2]  PRN medications  PRN Medications[3]     Assessment and Plan    Nadine Qureshi is a 85 y.o. female   Acute decompensated heart failure, will wait for echo results from cards office. I think she is diuresing well, I/O are incomplete. Renal function actually improved.   AF , stable.   CAD, triple vessel. Did not further intervention etc. Medical treatment. Added imdur yesterday.   Hypertension. Fairly stable overnight  Anemia, iron deficiency. Also chronic disease component. Monitor  Pulmonary hypertension. Currently hypoxic respiratory failure, on 2 " liters. 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       Jyotsna Chester MD         [1] aspirin, 81 mg, oral, Daily  carvedilol, 12.5 mg, oral, Daily  cholecalciferol, 50 mcg, oral, Daily  clopidogrel, 75 mg, oral, Daily  furosemide, 80 mg, intravenous, q12h  heparin, 5,000 Units, subcutaneous, q8h ADIS  isosorbide mononitrate ER, 30 mg, oral, Daily  losartan, 100 mg, oral, Daily  pantoprazole, 40 mg, oral, Daily before breakfast  [2]    [3] PRN medications: acetaminophen, cyclobenzaprine, melatonin, nitroglycerin, sodium chloride

## 2025-05-29 LAB
ANION GAP SERPL CALC-SCNC: 14 MMOL/L (ref 10–20)
BUN SERPL-MCNC: 81 MG/DL (ref 6–23)
CALCIUM SERPL-MCNC: 8.7 MG/DL (ref 8.6–10.3)
CHLORIDE SERPL-SCNC: 101 MMOL/L (ref 98–107)
CO2 SERPL-SCNC: 28 MMOL/L (ref 21–32)
CREAT SERPL-MCNC: 2.47 MG/DL (ref 0.5–1.05)
EGFRCR SERPLBLD CKD-EPI 2021: 19 ML/MIN/1.73M*2
GLUCOSE SERPL-MCNC: 99 MG/DL (ref 74–99)
HCT VFR BLD AUTO: 27.6 % (ref 36–46)
HGB BLD-MCNC: 8.6 G/DL (ref 12–16)
POTASSIUM SERPL-SCNC: 4.1 MMOL/L (ref 3.5–5.3)
SODIUM SERPL-SCNC: 139 MMOL/L (ref 136–145)

## 2025-05-29 PROCEDURE — 1200000002 HC GENERAL ROOM WITH TELEMETRY DAILY

## 2025-05-29 PROCEDURE — 2500000004 HC RX 250 GENERAL PHARMACY W/ HCPCS (ALT 636 FOR OP/ED): Mod: JZ | Performed by: INTERNAL MEDICINE

## 2025-05-29 PROCEDURE — 85014 HEMATOCRIT: CPT | Performed by: INTERNAL MEDICINE

## 2025-05-29 PROCEDURE — 36415 COLL VENOUS BLD VENIPUNCTURE: CPT | Performed by: INTERNAL MEDICINE

## 2025-05-29 PROCEDURE — 80048 BASIC METABOLIC PNL TOTAL CA: CPT | Performed by: INTERNAL MEDICINE

## 2025-05-29 PROCEDURE — 99232 SBSQ HOSP IP/OBS MODERATE 35: CPT | Performed by: INTERNAL MEDICINE

## 2025-05-29 PROCEDURE — 2500000001 HC RX 250 WO HCPCS SELF ADMINISTERED DRUGS (ALT 637 FOR MEDICARE OP): Performed by: INTERNAL MEDICINE

## 2025-05-29 RX ADMIN — ASPIRIN 81 MG: 81 TABLET, DELAYED RELEASE ORAL at 09:16

## 2025-05-29 RX ADMIN — ISOSORBIDE MONONITRATE 30 MG: 30 TABLET, EXTENDED RELEASE ORAL at 09:16

## 2025-05-29 RX ADMIN — CYCLOBENZAPRINE 5 MG: 10 TABLET, FILM COATED ORAL at 20:56

## 2025-05-29 RX ADMIN — Medication 50 MCG: at 09:16

## 2025-05-29 RX ADMIN — Medication 5 MG: at 20:56

## 2025-05-29 RX ADMIN — CLOPIDOGREL 75 MG: 75 TABLET ORAL at 09:15

## 2025-05-29 RX ADMIN — PANTOPRAZOLE SODIUM 40 MG: 40 TABLET, DELAYED RELEASE ORAL at 06:04

## 2025-05-29 RX ADMIN — FUROSEMIDE 80 MG: 10 INJECTION, SOLUTION INTRAMUSCULAR; INTRAVENOUS at 06:04

## 2025-05-29 RX ADMIN — FUROSEMIDE 80 MG: 10 INJECTION, SOLUTION INTRAMUSCULAR; INTRAVENOUS at 16:53

## 2025-05-29 RX ADMIN — CARVEDILOL 12.5 MG: 12.5 TABLET, FILM COATED ORAL at 09:16

## 2025-05-29 ASSESSMENT — COGNITIVE AND FUNCTIONAL STATUS - GENERAL
MOBILITY SCORE: 21
DAILY ACTIVITIY SCORE: 24
STANDING UP FROM CHAIR USING ARMS: A LITTLE
STANDING UP FROM CHAIR USING ARMS: A LITTLE
WALKING IN HOSPITAL ROOM: A LITTLE
MOBILITY SCORE: 21
CLIMB 3 TO 5 STEPS WITH RAILING: A LITTLE
WALKING IN HOSPITAL ROOM: A LITTLE
DAILY ACTIVITIY SCORE: 24
CLIMB 3 TO 5 STEPS WITH RAILING: A LITTLE

## 2025-05-29 ASSESSMENT — PAIN SCALES - GENERAL
PAINLEVEL_OUTOF10: 0 - NO PAIN
PAINLEVEL_OUTOF10: 0 - NO PAIN

## 2025-05-29 NOTE — CARE PLAN
The patient's goals for the shift include  comfort    The clinical goals for the shift include pt will remain safe

## 2025-05-29 NOTE — PROGRESS NOTES
Nadine Qureshi is a 85 y.o. female on day 2 of admission presenting with Acute on chronic congestive heart failure, unspecified heart failure type.      Subjective   She is sitting up in the bed, states she feels better but legs are still swollen and taunt.       Review of systems:  Constitutional: negative for fever, chills, or malaise  Neuro: negative for dizziness, headache, numbness, tingling  ENT: Negative for nasal congestion or sore throat  Resp: negative for shortness of breath, cough, or wheezing  CV: negative for chest pain, palpitations  GI: negative for abd pain, nausea, vomiting or diarrhea  : negative for dysuria, frequency, or urgency  Skin: negative for lesions, wounds, or rash  Musculoskeletal: Negative for weakness, myalgia, or arthralgia  Endocrine: Negative for polyuria or polydipsia         Objective   Constitutional: Well developed, awake/alert/oriented x3, no distress, alert and cooperative  Eyes: PERRL, EOMI, clear sclera  ENMT: mucous membranes moist, no apparent injury, no lesions seen  Head/Neck: Neck supple, no apparent injury, thyroid without mass or tenderness, No JVD, trachea midline, no bruits  Respiratory/Thorax: Patent airways, diminished breath sounds with good chest expansion, thorax symmetric  Cardiovascular: Regular, rate and rhythm, no murmurs, 2+ equal pulses of the extremities, normal S 1and S 2  Gastrointestinal: Nondistended, soft, non-tender, no rebound tenderness or guarding, no masses palpable, no organomegaly, +BS, no bruits  Musculoskeletal: ROM intact, no joint swelling, normal strength  Extremities: BLE swollen and taunt  Neurological: alert and oriented x3, intact senses, motor, response and reflexes, normal strength  Lymphatic: No significant lymphadenopathy  Psychological: Appropriate mood and behavior  Skin: Warm and dry, no lesions, no rashes      Last Recorded Vitals  /51 (BP Location: Left arm)   Pulse 61   Temp 36.3 °C (97.3 °F)   Resp 16   Ht  1.524 m (5')   Wt 87 kg (191 lb 14.4 oz)   SpO2 98%   BMI 37.48 kg/m²     Intake/Output last 3 Shifts:  I/O last 3 completed shifts:  In: 1116 (12.8 mL/kg) [P.O.:1116]  Out: 950 (10.9 mL/kg) [Urine:950 (0.3 mL/kg/hr)]  Weight: 87 kg   I/O this shift:  In: 720 [P.O.:720]  Out: -     Relevant Results  Scheduled medications  Scheduled Medications[1]  Continuous medications  Continuous Medications[2]  PRN medications  PRN Medications[3]    Results for orders placed or performed during the hospital encounter of 05/27/25 (from the past 24 hours)   Basic metabolic panel   Result Value Ref Range    Glucose 99 74 - 99 mg/dL    Sodium 139 136 - 145 mmol/L    Potassium 4.1 3.5 - 5.3 mmol/L    Chloride 101 98 - 107 mmol/L    Bicarbonate 28 21 - 32 mmol/L    Anion Gap 14 10 - 20 mmol/L    Urea Nitrogen 81 (H) 6 - 23 mg/dL    Creatinine 2.47 (H) 0.50 - 1.05 mg/dL    eGFR 19 (L) >60 mL/min/1.73m*2    Calcium 8.7 8.6 - 10.3 mg/dL   Hemoglobin and Hematocrit, Blood   Result Value Ref Range    Hemoglobin 8.6 (L) 12.0 - 16.0 g/dL    Hematocrit 27.6 (L) 36.0 - 46.0 %       XR chest 1 view   Final Result   Cardiomegaly with pulmonary vascular engorgement and small right   pleural effusion. No mariaelena pulmonary interstitial edema.        MACRO:   None        Signed by: Romaine Thakur 5/27/2025 1:57 PM   Dictation workstation:   [id number]          Transthoracic Echo (TTE) Complete  Result Date: 8/2/2024   [facility], [address] [place] [address]               Tel [phone] and Fax [phone] TRANSTHORACIC ECHOCARDIOGRAM REPORT  Patient Name:      LETICIA BROWNING      Yang Physician:    [id number] Rikki Briones MD Study Date:        8/2/2024             Ordering Provider:    [id number] KEY DARLING MRN/PID:           [id number]             Fellow: Accession#:        [id number]          Nurse:                Melissa Gay RN Date of Birth/Age: 1940 / 84 years Sonographer:          Wendy Guevara RDCS Gender:            F                    Additional Staff: Height:            152.40 cm            Admit Date:           8/2/2024 Weight:            84.82 kg             Admission Status:     Inpatient -                                                               Routine BSA / BMI:         1.81 m2 / 36.52      Encounter#:           [id number]                    kg/m2 Blood Pressure:    141/83 mmHg          Department Location:  [facility] Non                                                               Invasive Study Type:    TRANSTHORACIC ECHO (TTE) COMPLETE Diagnosis/ICD: Longstanding persistent AFib-I48.11; Pulmonary hypertension,                unspecified-I27.20; Acute on chronic diastolic (congestive) heart                failure (CHF)-I50.33 Indication:    Atrial Fibrillation, Congestive Heart Failure, PHTN CPT Code:      Echo Complete w Full [id number] Patient History: Pacer/Defib:       Permanent pacemaker Pertinent History: HTN, Hyperlipidemia, CAD, A-Fib and SSS. chronic kidney                    disease. Study Detail: The following Echo studies were performed: 2D, M-Mode, Doppler and               color flow. Technically challenging study due to prominent lung               artifact and poor acoustic windows. Definity used as a contrast               agent for endocardial border definition. Total contrast used for               this procedure was 2.0 mL via IV push. The patient was awake.  PHYSICIAN INTERPRETATION: Left Ventricle: The left ventricular systolic function is normal, with a visually estimated ejection fraction of 55-60%. Wall motion is abnormal. The left ventricular cavity size is normal. There is mild concentric left ventricular hypertrophy. Spectral Doppler shows a restrictive pattern of left  ventricular diastolic filling. Left Atrium: The left atrium is moderately dilated. Right Ventricle: The right ventricle is mildly enlarged. There is mildly reduced right ventricular systolic function. Right Atrium: The right atrium is moderately dilated. Aortic Valve: The aortic valve is trileaflet. There is mild to moderate aortic valve cusp calcification. The aortic valve dimensionless index is 0.68. There is no evidence of aortic valve regurgitation. The peak instantaneous gradient of the aortic valve is 4.8 mmHg. The mean gradient of the aortic valve is 3.0 mmHg. Mitral Valve: The mitral valve is normal in structure. There is mild to moderate mitral valve regurgitation. Tricuspid Valve: The tricuspid valve is structurally normal. There is moderate tricuspid regurgitation. Pulmonic Valve: The pulmonic valve is structurally normal. There is physiologic pulmonic valve regurgitation. Pericardium: There is no pericardial effusion noted. Aorta: The aortic root is normal. Pulmonary Artery: The tricuspid regurgitant velocity is 3.73 m/s, and with an estimated right atrial pressure of 3 mmHg, the estimated pulmonary artery pressure is moderately elevated with the RVSP at 58.7 mmHg. Pulmonary Veins: There is blunted systolic flow in the pulmonary veins. Systemic Veins: The inferior vena cava appears mildly dilated. There is poor inspiratory collapse of the IVC (less than 50%), consistent with elevated right atrial pressure.  CONCLUSIONS:  1. The left ventricular systolic function is normal, with a visually estimated ejection fraction of 55-60%.  2. Spectral Doppler shows a restrictive pattern of left ventricular diastolic filling.  3. Mildly enlarged right ventricle.  4. There is mildly reduced right ventricular systolic function.  5. The left atrium is moderately dilated.  6. The right atrium is moderately dilated.  7. Mild to moderate mitral valve regurgitation.  8. Moderate tricuspid regurgitation visualized.  9.  Moderately elevated pulmonary artery pressure. CVP is elevated. QUANTITATIVE DATA SUMMARY: 2D MEASUREMENTS:                           Normal Ranges: Ao Root d:     3.10 cm    (2.0-3.7cm) IVSd:          1.23 cm    (0.6-1.1cm) LVPWd:         1.25 cm    (0.6-1.1cm) LVIDd:         4.23 cm    (3.9-5.9cm) LVIDs:         3.51 cm LV Mass Index: 104.2 g/m2 LV % FS        17.0 % LA VOLUME:                               Normal Ranges: LA Vol A4C:        88.5 ml    (22+/-6mL/m2) LA Vol A2C:        80.0 ml LA Vol BP:         85.4 ml LA Vol Index A4C:  48.8ml/m2 LA Vol Index A2C:  44.1 ml/m2 LA Vol Index BP:   47.1 ml/m2 LA Area A4C:       25.3 cm2 LA Area A2C:       23.7 cm2 LA Major Axis A4C: 6.2 cm LA Major Axis A2C: 6.0 cm LA Volume Index:   44.2 ml/m2 LA Vol A4C:        81.9 ml LA Vol A2C:        76.6 ml RA VOLUME BY A/L METHOD:                       Normal Ranges: RA Area A4C: 26.5 cm2 M-MODE MEASUREMENTS:                  Normal Ranges: Ao Root: 3.10 cm (2.0-3.7cm) AoV Exc: 2.00 cm (1.5-2.5cm) LAs:     4.30 cm (2.7-4.0cm) AORTA MEASUREMENTS:                      Normal Ranges: AoV Exc:     2.00 cm (1.5-2.5cm) Ao Sinus, d: 3.10 cm (2.1-3.5cm) Asc Ao, d:   3.00 cm (2.1-3.4cm) LV SYSTOLIC FUNCTION BY 2D PLANIMETRY (MOD):                      Normal Ranges: EF-A4C View:    41 % (>=55%) EF-A2C View:    36 % EF-Biplane:     39 % EF-Visual:      58 % LV EF Reported: 58 % LV DIASTOLIC FUNCTION:                             Normal Ranges: MV Peak E:      0.97 m/s    (0.7-1.2 m/s) MV Peak A:      0.24 m/s    (0.42-0.7 m/s) E/A Ratio:      4.05        (1.0-2.2) MV e'           0.118 m/s   (>8.0) MV lateral e'   0.15 m/s MV medial e'    0.09 m/s MV A Dur:       151.00 msec E/e' Ratio:     8.24        (<8.0) PulmV Sys Víctor:  24.00 cm/s PulmV Zapata Víctor: 50.10 cm/s PulmV S/D Víctor:  0.50 MITRAL VALVE:                 Normal Ranges: MV DT: 130 msec (150-240msec) MITRAL INSUFFICIENCY:                           Normal Ranges: PISA Radius:  0.4  cm MR VTI:       189.00 cm MR Vmax:      524.00 cm/s MR Alias Víctor: 35.1 cm/s MR Volume:    12.73 ml MR Flow Rt:   35.29 ml/s MR EROA:      0.07 cm2 AORTIC VALVE:                                   Normal Ranges: AoV Vmax:                1.10 m/s (<=1.7m/s) AoV Peak P.8 mmHg (<20mmHg) AoV Mean PG:             3.0 mmHg (1.7-11.5mmHg) LVOT Max Víctor:            0.67 m/s (<=1.1m/s) AoV VTI:                 22.50 cm (18-25cm) LVOT VTI:                15.20 cm LVOT Diameter:           2.00 cm  (1.8-2.4cm) AoV Area, VTI:           2.12 cm2 (2.5-5.5cm2) AoV Area,Vmax:           1.92 cm2 (2.5-4.5cm2) AoV Dimensionless Index: 0.68  RIGHT VENTRICLE: TAPSE: 15.7 mm RV s'  0.08 m/s TRICUSPID VALVE/RVSP:                             Normal Ranges: Peak TR Velocity: 3.73 m/s RV Syst Pressure: 58.7 mmHg (< 30mmHg) IVC Diam:         2.32 cm PULMONIC VALVE:                      Normal Ranges: PV Max Víctor: 0.7 m/s  (0.6-0.9m/s) PV Max P.9 mmHg Pulmonary Veins: PulmV Zapata Víctor: 50.10 cm/s PulmV S/D Víctor:  0.50 PulmV Sys Víctor:  24.00 cm/s  [id number] Rikki Briones MD Electronically signed on 2024 at 7:15:29 PM  ** Final **            Assessment/Plan   Assessment & Plan  Acute on chronic congestive heart failure, unspecified heart failure type      Echocardiogram from 2025: LVEF 50%, mild LVH, mod biatrial enlargement, mod MR, mod to severe TR, diastolic dysfunction, mod to severe pulm HTN        #Acute on chronic diastolic congestive heart failure  -BNP 1144  -CXR showed cardiomegaly with pulmonary vascular engorgement and small right pleural effusion  -I reviewed the Echocardiogram from above  -Strict I & Os  -Daily weights->standing   -2gm na diet  -1500mL fluid restriction   -Cont Lasix IV BID  -Did not like the way Jardiance made her feel  -Cont Imdur and coreg  -Hold losartan due to MONTY     #Elevated Troponin - Non MI  #Coronary artery disease  -Hx of PCI/JOSE to PDA and ostial RCA  -Troponin 33, 31  -I  reviewed the EKG, ventricular paced rhythm  -CXR showed CHF  -I reviewed the echocardiogram from above  -[facility] in March [facility]24 reviewed, triple vessel disease   -Denies chest pain  -Continue medical management of carvedilol and clopidogrel  -Is unable to tolerate statins, can discuss starting PCSK9 inhibitors outpatient     # Moderate to severe pulmonary Hypertension  - Echocardiogram as above  - right heart cath reviewed as above, mPAP 45-50 mmHg     # Persistent atrial fibrillation  - I reviewed the 12 lead ECG, ventricular paced  - Not on any current anticoagulant, refused  - Refused watchman or RFA/PVI  - Cont Carvedilol 12.5 mg     # Hypertension  - continue meds  - blood pressure stable     # Acute on chronic kidney disease  - creatinine 2.37->2.15->2.47  - Cont meds and monitor kidney function closely     # Sick sinus syndrome s/p [employer] pacemaker  - Personally reviewed interrogation last month, normal functioning      ODALYS Sánchez-CNP         [1] aspirin, 81 mg, oral, Daily  carvedilol, 12.5 mg, oral, Daily  cholecalciferol, 50 mcg, oral, Daily  clopidogrel, 75 mg, oral, Daily  furosemide, 80 mg, intravenous, q12h  heparin, 5,000 Units, subcutaneous, q8h ADIS  isosorbide mononitrate ER, 30 mg, oral, Daily  [Held by provider] losartan, 100 mg, oral, Daily  pantoprazole, 40 mg, oral, Daily before breakfast  [2]    [3] PRN medications: acetaminophen, cyclobenzaprine, melatonin, nitroglycerin, sodium chloride

## 2025-05-29 NOTE — PROGRESS NOTES
05/29/25 0818   Discharge Planning   Living Arrangements Children;Family members  (daughter and son-in-law)   Support Systems Spouse/significant other;Family members;Friends/neighbors   Assistance Needed Patient is A&O X3, on room air at baseline, does not uses a CPAP/BIPAP at home, is not currently active with any community/home care agencies, requires assistance with bathing, does not drive ([employer]) and uses a walker for ambulation.   Type of Residence Private residence   Number of Stairs to Enter Residence 4   Number of Stairs Within Residence 0   Do you have animals or pets at home? No   Who is requesting discharge planning? Provider   Home or Post Acute Services None   Expected Discharge Disposition Home   Does the patient need discharge transport arranged? Yes   RoundTrip coordination needed? No   Has discharge transport been arranged? No   Stroke Family Assessment   Stroke Family Assessment Needed No   Intensity of Service   Intensity of Service 0-30 min

## 2025-05-29 NOTE — PROGRESS NOTES
Patient: Nadine Qureshi  Room/bed: 137/137-A  Admitted on: 5/27/2025    Age: 85 y.o.   Gender: female  Code Status:  DNR and No Intubation and No ICU   Admitting Dx: Shortness of breath [R06.02]  Acute on chronic congestive heart failure, unspecified heart failure type [I50.9]    MRN: [id number]  PCP: Sherrie Ortiz MD       Subjective   Feeling better, wants to go home. Denies any chest discomfort. Has been off oxygen since yesterday afternoon    Objective    Physical Exam  Constitutional:       Comments: Awake, alert, lying almost flat in bed without difficulty this am.    HENT:      Head: Normocephalic.      Ears:      Comments: Mildly [place]     Nose: Nose normal.      Mouth/Throat:      Mouth: Mucous membranes are moist.   Eyes:      Extraocular Movements: Extraocular movements intact.      Pupils: Pupils are equal, round, and reactive to light.   Neck:      Comments: JVD is present  Cardiovascular:      Pulses: Normal pulses.      Comments: Regular currently. 1/6 short low pitched systolic murmur  Pulmonary:      Comments: Crackles in left base primarily. Equal air exchange  Abdominal:      Comments: Soft, less distended. No tenderness  Present bowel sounds   Musculoskeletal:      Comments: Pitting edema remains in lower extremities, less prominent  Blister is still intact  Pulses equal   Skin:     General: Skin is warm and dry.   Neurological:      General: No focal deficit present.      Mental Status: She is oriented to person, place, and time.   Psychiatric:         Mood and Affect: Mood normal.         Behavior: Behavior normal.          Temp:  [35.9 °C (96.6 °F)-36.6 °C (97.9 °F)] 35.9 °C (96.6 °F)  Heart Rate:  [57-69] 68  Resp:  [14-18] 18  BP: (106-153)/(49-85) 118/49    Vitals:    05/29/25 0600   Weight: 87 kg (191 lb 14.4 oz)           I/Os    Intake/Output Summary (Last 24 hours) at 5/29/2025 0857  Last data filed at 5/29/2025 0812  Gross per 24 hour   Intake 1078 ml   Output 950 ml   Net 128  "ml       Labs:   Results from last 72 hours   Lab Units 05/29/25  0603 05/28/25  0546 05/27/25  1330   SODIUM mmol/L 139 140 139   POTASSIUM mmol/L 4.1 4.1 4.4   CHLORIDE mmol/L 101 100 102   CO2 mmol/L 28 27 25   BUN mg/dL 81* 75* 76*   CREATININE mg/dL 2.47* 2.15* 2.37*   GLUCOSE mg/dL 99 101* 114*   CALCIUM mg/dL 8.7 9.1 8.8   ANION GAP mmol/L 14 17 16   EGFR mL/min/1.73m*2 19* 22* 20*      Results from last 72 hours   Lab Units 05/29/25  0603 05/27/25  1330   WBC AUTO x10*3/uL  --  4.8   HEMOGLOBIN g/dL 8.6* 9.2*   HEMATOCRIT % 27.6* 29.7*   PLATELETS AUTO x10*3/uL  --  164   NEUTROS PCT AUTO %  --  68.8   LYMPHS PCT AUTO %  --  15.9   MONOS PCT AUTO %  --  12.4   EOS PCT AUTO %  --  2.1      Lab Results   Component Value Date    CALCIUM 8.7 05/29/2025    PHOS 4.0 08/04/2024      Lab Results   Component Value Date    CRP 0.14 09/13/2022      [unfilled]     Micro/ID:   No results found for the last 90 days.                   No lab exists for component: \"AGALPCRNB\"   .ID  No results found for: \"URINECULTURE\", \"BLOODCULT\", \"CSFCULTSMEAR\"    Images:  ECG 12 lead  Ventricular-paced rhythm  Abnormal ECG  When compared with ECG of 21-AUG-2024 01:38,  Vent. rate has decreased BY  10 BPM  ECG 12 lead  Ventricular-paced rhythm  Abnormal ECG  When compared with ECG of 27-MAY-2025 13:40, (unconfirmed)  No significant change was found  ECG 12 lead  Ventricular-paced rhythm  Abnormal ECG  When compared with ECG of 27-MAY-2025 13:40, (unconfirmed)  No significant change was found       Meds    Scheduled medications  Scheduled Medications[1]  Continuous medications  Continuous Medications[2]  PRN medications  PRN Medications[3]     Assessment and Plan    Nadine Qureshi is a 85 y.o. female   Acute decompensated diastolic heart failure. Improved. I/o are incomplete, but weight has increased if scales are to be believed. Symptomatically improved however. Appreciate cards input. Has been on increased lasix, however creat has " bumped today, will need to adjust  Mitral regurg/tricuspid regurg, moderate  Moderate pulm hypertension, bordering on severe  MONTY, on ckd. Initially improved, now bumped up. May need to lower dose of lasix today, or hold for a day.   Hypertension, improved control  Anemia. Chronic disease with component of iron deficiency  CAD. Has not had any recurrence of chest discomfort after addition of Imdur.  Dispo depending on cards input    Jyotsna Chester MD         [1] aspirin, 81 mg, oral, Daily  carvedilol, 12.5 mg, oral, Daily  cholecalciferol, 50 mcg, oral, Daily  clopidogrel, 75 mg, oral, Daily  furosemide, 80 mg, intravenous, q12h  heparin, 5,000 Units, subcutaneous, q8h ADIS  isosorbide mononitrate ER, 30 mg, oral, Daily  losartan, 100 mg, oral, Daily  pantoprazole, 40 mg, oral, Daily before breakfast  [2]    [3] PRN medications: acetaminophen, cyclobenzaprine, melatonin, nitroglycerin, sodium chloride

## 2025-05-29 NOTE — NURSING NOTE
Nurse into see Kimberly Qureshi,age 85 years,admitted 5/27/25 for SOB, CHF.   Consult requested for wound on RLE. Patient currently has bilateral +3 pitting edema due to fluid build up.  She has a blister mid section of her tibia. Currently blister intact.   Patient states she wears compression hose at home.   Currently patient resting comfortably. Nurse will follow along as necessary.

## 2025-05-29 NOTE — CARE PLAN
The patient's goals for the shift include      The clinical goals for the shift include pt will continue IV lasix and remain HDS

## 2025-05-30 VITALS
HEIGHT: 60 IN | BODY MASS INDEX: 36.49 KG/M2 | DIASTOLIC BLOOD PRESSURE: 77 MMHG | RESPIRATION RATE: 16 BRPM | SYSTOLIC BLOOD PRESSURE: 145 MMHG | TEMPERATURE: 97.7 F | OXYGEN SATURATION: 96 % | HEART RATE: 69 BPM | WEIGHT: 185.85 LBS

## 2025-05-30 PROBLEM — I50.9 ACUTE ON CHRONIC CONGESTIVE HEART FAILURE, UNSPECIFIED HEART FAILURE TYPE: Status: RESOLVED | Noted: 2025-05-27 | Resolved: 2025-05-30

## 2025-05-30 LAB
ANION GAP SERPL CALC-SCNC: 15 MMOL/L (ref 10–20)
BUN SERPL-MCNC: 81 MG/DL (ref 6–23)
CALCIUM SERPL-MCNC: 8.7 MG/DL (ref 8.6–10.3)
CHLORIDE SERPL-SCNC: 99 MMOL/L (ref 98–107)
CO2 SERPL-SCNC: 29 MMOL/L (ref 21–32)
CREAT SERPL-MCNC: 2.61 MG/DL (ref 0.5–1.05)
EGFRCR SERPLBLD CKD-EPI 2021: 18 ML/MIN/1.73M*2
GLUCOSE SERPL-MCNC: 99 MG/DL (ref 74–99)
POTASSIUM SERPL-SCNC: 4 MMOL/L (ref 3.5–5.3)
SODIUM SERPL-SCNC: 139 MMOL/L (ref 136–145)

## 2025-05-30 PROCEDURE — 36415 COLL VENOUS BLD VENIPUNCTURE: CPT | Performed by: INTERNAL MEDICINE

## 2025-05-30 PROCEDURE — 2500000004 HC RX 250 GENERAL PHARMACY W/ HCPCS (ALT 636 FOR OP/ED): Mod: JZ | Performed by: INTERNAL MEDICINE

## 2025-05-30 PROCEDURE — 82374 ASSAY BLOOD CARBON DIOXIDE: CPT | Performed by: INTERNAL MEDICINE

## 2025-05-30 PROCEDURE — 99238 HOSP IP/OBS DSCHRG MGMT 30/<: CPT | Performed by: INTERNAL MEDICINE

## 2025-05-30 PROCEDURE — 2500000001 HC RX 250 WO HCPCS SELF ADMINISTERED DRUGS (ALT 637 FOR MEDICARE OP): Performed by: INTERNAL MEDICINE

## 2025-05-30 RX ORDER — ASPIRIN 81 MG/1
81 TABLET ORAL DAILY
Start: 2025-05-31

## 2025-05-30 RX ORDER — AMLODIPINE BESYLATE 5 MG/1
5 TABLET ORAL DAILY
Qty: 30 TABLET | Refills: 0 | Status: SHIPPED | OUTPATIENT
Start: 2025-05-30

## 2025-05-30 RX ORDER — PANTOPRAZOLE SODIUM 40 MG/1
40 TABLET, DELAYED RELEASE ORAL
Qty: 30 TABLET | Refills: 0 | Status: SHIPPED | OUTPATIENT
Start: 2025-05-31

## 2025-05-30 RX ORDER — ISOSORBIDE MONONITRATE 30 MG/1
30 TABLET, EXTENDED RELEASE ORAL DAILY
Qty: 30 TABLET | Refills: 0 | Status: SHIPPED | OUTPATIENT
Start: 2025-05-31

## 2025-05-30 RX ADMIN — FUROSEMIDE 80 MG: 10 INJECTION, SOLUTION INTRAMUSCULAR; INTRAVENOUS at 05:17

## 2025-05-30 RX ADMIN — CARVEDILOL 12.5 MG: 12.5 TABLET, FILM COATED ORAL at 08:23

## 2025-05-30 RX ADMIN — Medication 50 MCG: at 08:23

## 2025-05-30 RX ADMIN — ISOSORBIDE MONONITRATE 30 MG: 30 TABLET, EXTENDED RELEASE ORAL at 08:23

## 2025-05-30 RX ADMIN — CLOPIDOGREL 75 MG: 75 TABLET ORAL at 08:23

## 2025-05-30 RX ADMIN — ASPIRIN 81 MG: 81 TABLET, DELAYED RELEASE ORAL at 08:23

## 2025-05-30 RX ADMIN — PANTOPRAZOLE SODIUM 40 MG: 40 TABLET, DELAYED RELEASE ORAL at 05:17

## 2025-05-30 ASSESSMENT — COGNITIVE AND FUNCTIONAL STATUS - GENERAL
TOILETING: A LITTLE
MOBILITY SCORE: 20
DRESSING REGULAR LOWER BODY CLOTHING: A LITTLE
DAILY ACTIVITIY SCORE: 20
STANDING UP FROM CHAIR USING ARMS: A LITTLE
MOVING TO AND FROM BED TO CHAIR: A LITTLE
WALKING IN HOSPITAL ROOM: A LITTLE
DRESSING REGULAR UPPER BODY CLOTHING: A LITTLE
CLIMB 3 TO 5 STEPS WITH RAILING: A LITTLE
HELP NEEDED FOR BATHING: A LITTLE

## 2025-05-30 ASSESSMENT — PAIN SCALES - GENERAL: PAINLEVEL_OUTOF10: 0 - NO PAIN

## 2025-05-30 NOTE — PROGRESS NOTES
Nadine Qureshi is a 85 y.o. female on day 3 of admission presenting with Acute on chronic congestive heart failure, unspecified heart failure type.      Subjective   She is sitting up in the bed, states her legs feel back to baseline. Abd swelling gone and denies shortness of breath.       Review of systems:  Constitutional: negative for fever, chills, or malaise  Neuro: negative for dizziness, headache, numbness, tingling  ENT: Negative for nasal congestion or sore throat  Resp: negative for shortness of breath, cough, or wheezing  CV: negative for chest pain, palpitations  GI: negative for abd pain, nausea, vomiting or diarrhea  : negative for dysuria, frequency, or urgency  Skin: negative for lesions, wounds, or rash  Musculoskeletal: Negative for weakness, myalgia, or arthralgia  Endocrine: Negative for polyuria or polydipsia         Objective   Constitutional: Well developed, awake/alert/oriented x3, no distress, alert and cooperative  Eyes: PERRL, EOMI, clear sclera  ENMT: mucous membranes moist, no apparent injury, no lesions seen  Head/Neck: Neck supple, no apparent injury, thyroid without mass or tenderness, No JVD, trachea midline, no bruits  Respiratory/Thorax: Patent airways, diminished breath sounds with good chest expansion, thorax symmetric  Cardiovascular: Regular, rate and rhythm, no murmurs, 2+ equal pulses of the extremities, normal S 1and S 2  Gastrointestinal: Nondistended, soft, non-tender, no rebound tenderness or guarding, no masses palpable, no organomegaly, +BS, no bruits  Musculoskeletal: ROM intact, no joint swelling, normal strength  Extremities: BLE swollen and taunt  Neurological: alert and oriented x3, intact senses, motor, response and reflexes, normal strength  Lymphatic: No significant lymphadenopathy  Psychological: Appropriate mood and behavior  Skin: Warm and dry, no lesions, no rashes      Last Recorded Vitals  /77 (BP Location: Right arm)   Pulse 69   Temp 36.5 °C  (97.7 °F) (Temporal)   Resp 16   Ht 1.524 m (5')   Wt 84.3 kg (185 lb 13.6 oz)   SpO2 96%   BMI 36.30 kg/m²     Intake/Output last 3 Shifts:  I/O last 3 completed shifts:  In: 1010 (12 mL/kg) [P.O.:1010]  Out: 2000 (23.7 mL/kg) [Urine:2000 (0.7 mL/kg/hr)]  Weight: 84.3 kg   I/O this shift:  In: 300 [P.O.:300]  Out: 200 [Urine:200]    Relevant Results  Scheduled medications  Scheduled Medications[1]  Continuous medications  Continuous Medications[2]  PRN medications  PRN Medications[3]    Results for orders placed or performed during the hospital encounter of 05/27/25 (from the past 24 hours)   Basic metabolic panel   Result Value Ref Range    Glucose 99 74 - 99 mg/dL    Sodium 139 136 - 145 mmol/L    Potassium 4.0 3.5 - 5.3 mmol/L    Chloride 99 98 - 107 mmol/L    Bicarbonate 29 21 - 32 mmol/L    Anion Gap 15 10 - 20 mmol/L    Urea Nitrogen 81 (H) 6 - 23 mg/dL    Creatinine 2.61 (H) 0.50 - 1.05 mg/dL    eGFR 18 (L) >60 mL/min/1.73m*2    Calcium 8.7 8.6 - 10.3 mg/dL       XR chest 1 view   Final Result   Cardiomegaly with pulmonary vascular engorgement and small right   pleural effusion. No mariaelena pulmonary interstitial edema.        MACRO:   None        Signed by: Romaine Thakur 5/27/2025 1:57 PM   Dictation workstation:   [id number]          Transthoracic Echo (TTE) Complete  Result Date: 8/2/2024   [facility], [address] [place] [address]               Tel [phone] and Fax [phone] TRANSTHORACIC ECHOCARDIOGRAM REPORT  Patient Name:      LETICIA Soria Physician:    [id number] Rikki Briones MD Study Date:        8/2/2024             Ordering Provider:    [id number] KEY DARLING MRN/PID:           [id number]             Fellow: Accession#:        [id number]         Nurse:                Melissa Gay RN Date of Birth/Age: 1940 / 84 years  Sonographer:          Wendy Guevara                                                               JAMIE Gender:            F                    Additional Staff: Height:            152.40 cm            Admit Date:           8/2/2024 Weight:            84.82 kg             Admission Status:     Inpatient -                                                               Routine BSA / BMI:         1.81 m2 / 36.52      Encounter#:           [id number]                    kg/m2 Blood Pressure:    141/83 mmHg          Department Location:  [facility]                                                               Invasive Study Type:    TRANSTHORACIC ECHO (TTE) COMPLETE Diagnosis/ICD: Longstanding persistent AFib-I48.11; Pulmonary hypertension,                unspecified-I27.20; Acute on chronic diastolic (congestive) heart                failure (CHF)-I50.33 Indication:    Atrial Fibrillation, Congestive Heart Failure, PHTN CPT Code:      Echo Complete w Full Doppler-[id number] Patient History: Pacer/Defib:       Permanent pacemaker Pertinent History: HTN, Hyperlipidemia, CAD, A-Fib and SSS. chronic kidney                    disease. Study Detail: The following Echo studies were performed: 2D, M-Mode, Doppler and               color flow. Technically challenging study due to prominent lung               artifact and poor acoustic windows. Definity used as a contrast               agent for endocardial border definition. Total contrast used for               this procedure was 2.0 mL via IV push. The patient was awake.  PHYSICIAN INTERPRETATION: Left Ventricle: The left ventricular systolic function is normal, with a visually estimated ejection fraction of 55-60%. Wall motion is abnormal. The left ventricular cavity size is normal. There is mild concentric left ventricular hypertrophy. Spectral Doppler shows a restrictive pattern of left ventricular diastolic filling. Left Atrium: The left atrium is moderately dilated. Right  Ventricle: The right ventricle is mildly enlarged. There is mildly reduced right ventricular systolic function. Right Atrium: The right atrium is moderately dilated. Aortic Valve: The aortic valve is trileaflet. There is mild to moderate aortic valve cusp calcification. The aortic valve dimensionless index is 0.68. There is no evidence of aortic valve regurgitation. The peak instantaneous gradient of the aortic valve is 4.8 mmHg. The mean gradient of the aortic valve is 3.0 mmHg. Mitral Valve: The mitral valve is normal in structure. There is mild to moderate mitral valve regurgitation. Tricuspid Valve: The tricuspid valve is structurally normal. There is moderate tricuspid regurgitation. Pulmonic Valve: The pulmonic valve is structurally normal. There is physiologic pulmonic valve regurgitation. Pericardium: There is no pericardial effusion noted. Aorta: The aortic root is normal. Pulmonary Artery: The tricuspid regurgitant velocity is 3.73 m/s, and with an estimated right atrial pressure of 3 mmHg, the estimated pulmonary artery pressure is moderately elevated with the RVSP at 58.7 mmHg. Pulmonary Veins: There is blunted systolic flow in the pulmonary veins. Systemic Veins: The inferior vena cava appears mildly dilated. There is poor inspiratory collapse of the IVC (less than 50%), consistent with elevated right atrial pressure.  CONCLUSIONS:  1. The left ventricular systolic function is normal, with a visually estimated ejection fraction of 55-60%.  2. Spectral Doppler shows a restrictive pattern of left ventricular diastolic filling.  3. Mildly enlarged right ventricle.  4. There is mildly reduced right ventricular systolic function.  5. The left atrium is moderately dilated.  6. The right atrium is moderately dilated.  7. Mild to moderate mitral valve regurgitation.  8. Moderate tricuspid regurgitation visualized.  9. Moderately elevated pulmonary artery pressure. CVP is elevated. QUANTITATIVE DATA SUMMARY: 2D  MEASUREMENTS:                           Normal Ranges: Ao Root d:     3.10 cm    (2.0-3.7cm) IVSd:          1.23 cm    (0.6-1.1cm) LVPWd:         1.25 cm    (0.6-1.1cm) LVIDd:         4.23 cm    (3.9-5.9cm) LVIDs:         3.51 cm LV Mass Index: 104.2 g/m2 LV % FS        17.0 % LA VOLUME:                               Normal Ranges: LA Vol A4C:        88.5 ml    (22+/-6mL/m2) LA Vol A2C:        80.0 ml LA Vol BP:         85.4 ml LA Vol Index A4C:  48.8ml/m2 LA Vol Index A2C:  44.1 ml/m2 LA Vol Index BP:   47.1 ml/m2 LA Area A4C:       25.3 cm2 LA Area A2C:       23.7 cm2 LA Major Axis A4C: 6.2 cm LA Major Axis A2C: 6.0 cm LA Volume Index:   44.2 ml/m2 LA Vol A4C:        81.9 ml LA Vol A2C:        76.6 ml RA VOLUME BY A/L METHOD:                       Normal Ranges: RA Area A4C: 26.5 cm2 M-MODE MEASUREMENTS:                  Normal Ranges: Ao Root: 3.10 cm (2.0-3.7cm) AoV Exc: 2.00 cm (1.5-2.5cm) LAs:     4.30 cm (2.7-4.0cm) AORTA MEASUREMENTS:                      Normal Ranges: AoV Exc:     2.00 cm (1.5-2.5cm) Ao Sinus, d: 3.10 cm (2.1-3.5cm) Asc Ao, d:   3.00 cm (2.1-3.4cm) LV SYSTOLIC FUNCTION BY 2D PLANIMETRY (MOD):                      Normal Ranges: EF-A4C View:    41 % (>=55%) EF-A2C View:    36 % EF-Biplane:     39 % EF-Visual:      58 % LV EF Reported: 58 % LV DIASTOLIC FUNCTION:                             Normal Ranges: MV Peak E:      0.97 m/s    (0.7-1.2 m/s) MV Peak A:      0.24 m/s    (0.42-0.7 m/s) E/A Ratio:      4.05        (1.0-2.2) MV e'           0.118 m/s   (>8.0) MV lateral e'   0.15 m/s MV medial e'    0.09 m/s MV A Dur:       151.00 msec E/e' Ratio:     8.24        (<8.0) PulmV Sys Víctor:  24.00 cm/s PulmV [place] Víctor: 50.10 cm/s PulmV S/D Víctor:  0.50 MITRAL VALVE:                 Normal Ranges: MV DT: 130 msec (150-240msec) MITRAL INSUFFICIENCY:                           Normal Ranges: PISA Radius:  0.4 cm MR VTI:       189.00 cm MR Vmax:      524.00 cm/s MR Alias Víctor: 35.1 cm/s MR Volume:     12.73 ml MR Flow Rt:   35.29 ml/s MR EROA:      0.07 cm2 AORTIC VALVE:                                   Normal Ranges: AoV Vmax:                1.10 m/s (<=1.7m/s) AoV Peak P.8 mmHg (<20mmHg) AoV Mean PG:             3.0 mmHg (1.7-11.5mmHg) LVOT Max Víctor:            0.67 m/s (<=1.1m/s) AoV VTI:                 22.50 cm (18-25cm) LVOT VTI:                15.20 cm LVOT Diameter:           2.00 cm  (1.8-2.4cm) AoV Area, VTI:           2.12 cm2 (2.5-5.5cm2) AoV Area,Vmax:           1.92 cm2 (2.5-4.5cm2) AoV Dimensionless Index: 0.68  RIGHT VENTRICLE: TAPSE: 15.7 mm RV s'  0.08 m/s TRICUSPID VALVE/RVSP:                             Normal Ranges: Peak TR Velocity: 3.73 m/s RV Syst Pressure: 58.7 mmHg (< 30mmHg) IVC Diam:         2.32 cm PULMONIC VALVE:                      Normal Ranges: PV Max Víctor: 0.7 m/s  (0.6-0.9m/s) PV Max P.9 mmHg Pulmonary Veins: PulmV [place] Víctor: 50.10 cm/s PulmV S/D Víctor:  0.50 PulmV Sys Víctor:  24.00 cm/s  [id number] Rikki Briones MD Electronically signed on 2024 at 7:15:29 PM  ** Final **            Assessment/Plan   Assessment & Plan      Echocardiogram from 2025: LVEF 50%, mild LVH, mod biatrial enlargement, mod MR, mod to severe TR, diastolic dysfunction, mod to severe pulm HTN        #Acute on chronic diastolic congestive heart failure, severe pulmonary HTN  -BNP 1144  -CXR showed cardiomegaly with pulmonary vascular engorgement and small right pleural effusion  -I reviewed the Echocardiogram from above  -Strict I & Os  -Daily weights->standing   -2gm na diet  -1500mL fluid restriction   -Cont Lasix->change to 80mg PO BID  -Did not like the way Jardiance made her feel  -Cont Imdur and coreg  -Hold losartan due to MONTY  -RHC from last year reviewed, severe pulm HTN  -Discussed with Dr. Brandt, may benefit from tadalafil 20mg BID as outpt    #Elevated Troponin - Non MI  #Coronary artery disease  -Hx of PCI/JOSE to PDA and ostial RCA  -Troponin 33, 31  -I reviewed  the EKG, ventricular paced rhythm  -CXR showed CHF  -I reviewed the echocardiogram from above  -[facility] in [facility] reviewed, triple vessel disease   -Denies chest pain  -Continue medical management of carvedilol and clopidogrel  -Is unable to tolerate statins, can discuss starting PCSK9 inhibitors outpatient     # Moderate to severe pulmonary Hypertension  - Echocardiogram as above  - right heart cath reviewed as above, mPAP 45-50 mmHg     # Persistent atrial fibrillation  - I reviewed the 12 lead ECG, ventricular paced  - Not on any current anticoagulant, refused  - Refused watchman or RFA/PVI  - Cont Carvedilol 12.5 mg     # Hypertension  - continue meds  - blood pressure stable     # Acute on chronic kidney disease  - creatinine 2.37->2.15->2.47->2.6  - Cont meds and monitor kidney function closely  -Advised outpt nephrology consult     # Sick sinus syndrome s/p [employer] pacemaker  - Personally reviewed interrogation last month, normal functioning      ODALYS Sánchez-CNP         [1] aspirin, 81 mg, oral, Daily  carvedilol, 12.5 mg, oral, Daily  cholecalciferol, 50 mcg, oral, Daily  clopidogrel, 75 mg, oral, Daily  furosemide, 80 mg, intravenous, q12h  heparin, 5,000 Units, subcutaneous, q8h ADIS  isosorbide mononitrate ER, 30 mg, oral, Daily  [Held by provider] losartan, 100 mg, oral, Daily  pantoprazole, 40 mg, oral, Daily before breakfast     [2]    [3] PRN medications: acetaminophen, cyclobenzaprine, melatonin, nitroglycerin, sodium chloride

## 2025-05-30 NOTE — PROGRESS NOTES
05/30/25 1202   Discharge Planning   Living Arrangements Children;Family members  (daughter and son-in-law)   Support Systems Spouse/significant other;Family members;Friends/neighbors   Assistance Needed Patient is A&O X3, on room air at baseline, does not uses a CPAP/BIPAP at home, is not currently active with any community/home care agencies, requires assistance with bathing, does not drive ([place]) and uses a walker for ambulation.   Type of Residence Private residence   Number of Stairs to Enter Residence 4   Number of Stairs Within Residence 0   Do you have animals or pets at home? No   Who is requesting discharge planning? Provider   Home or Post Acute Services None   Expected Discharge Disposition Home   Does the patient need discharge transport arranged? Yes   RoundTrip coordination needed? Yes   Has discharge transport been arranged? No

## 2025-05-30 NOTE — CARE PLAN
The patient's goals for the shift include  get some sleep    The clinical goals for the shift include pt will continue to have output

## 2025-05-30 NOTE — CARE PLAN
The patient's goals for the shift include  use call light for assistance     The clinical goals for the shift include pt will continue to have output

## 2025-05-30 NOTE — DISCHARGE SUMMARY
Discharge Diagnosis  Acute on chronic congestive heart failure, unspecified heart failure type     Progressive CKD  Moderately severe pulm hypertension  Issues Requiring Follow-Up  As above    Discharge Meds     Medication List      START taking these medications     amLODIPine 5 mg tablet; Commonly known as: Norvasc; Take 1 tablet (5 mg)   by mouth once daily.   aspirin 81 mg EC tablet; Take 1 tablet (81 mg) by mouth once daily.;   Start taking on: May 31, 2025   isosorbide mononitrate ER 30 mg 24 hr tablet; Commonly known as: Imdur;   Take 1 tablet (30 mg) by mouth once daily. Do not crush or chew.; Start   taking on: May 31, 2025   pantoprazole 40 mg EC tablet; Commonly known as: ProtoNix; Take 1 tablet   (40 mg) by mouth once daily in the morning. Take before meals. Do not   crush, chew, or split.; Start taking on: May 31, 2025     CONTINUE taking these medications     carvedilol 12.5 mg tablet; Commonly known as: Coreg   cholecalciferol 50 mcg (2,000 units) capsule; Commonly known as: Vitamin   D-3   clopidogrel 75 mg tablet; Commonly known as: Plavix   cyclobenzaprine 5 mg tablet; Commonly known as: Flexeril; Take 1 tablet   (5 mg) by mouth as needed at bedtime for muscle spasms.   fluticasone 50 mcg/actuation nasal spray; Commonly known as: Flonase;   Administer 2 sprays into each nostril once daily. Shake gently. Before   first use, prime pump. After use, clean tip and replace cap.   furosemide 40 mg tablet; Commonly known as: Lasix   GLUCOSAMINE ORAL   Nitrostat 0.4 mg SL tablet; Generic drug: nitroglycerin   potassium chloride CR 20 mEq ER tablet; Commonly known as: Klor-Con M20;   Take 1 tablet by mouth once daily   sodium chloride 0.65 % nasal spray; Commonly known as: [place]   VALERIAN ROOT ORAL     STOP taking these medications     benzonatate 200 mg capsule; Commonly known as: Tessalon   losartan 100 mg tablet; Commonly known as: Cozaar   UNABLE TO FIND       Test Results Pending At Discharge  Pending  Labs       No current pending labs.            Hospital Course   Ms. Qureshi is a very pleasant woman, admitted with decompensated diastolic heart failure, initially requiring oxygen which has since been discontinued. Dr Brandt was consulted while here. Echo was performed, revealed fairly severe TR as well as moderately severe pulmonary hypertension as well. She was placed on iv lasix, and did diurese , with improvement of her symptoms. Still has residual edema. Cozaar was discontinued as she has had some decline in her renal function-we are making referral for outpt nephrology consult. Imdur and norvasc have been added to her regimen for now, cards plans on starting Tadalafil as outpt. She is feeling better, is anxious to go home. She also has anemia, there is a component of iron deficiency as well. She is afebrile. No evidence of any infection. She will be discharged today. BMP ordered for next Thursday. Cards will arrange for office follow up . See PCP in one week    Pertinent Physical Exam At Time of Discharge  Stable, see notes    Outpatient Follow-Up  PCP--one week  Nephrology, referral placed      Jyotsna Chester MD

## 2025-06-03 ENCOUNTER — PATIENT OUTREACH (OUTPATIENT)
Dept: PRIMARY CARE | Facility: CLINIC | Age: 85
End: 2025-06-03
Payer: COMMERCIAL

## 2025-06-03 NOTE — PROGRESS NOTES
TCM completed 06/03/25   Discharge Facility: [facility]  Discharge Diagnosis: Acute on chronic diastolic congestive heart failure, severe pulmonary HTN   Admission Date: 5/27/25  Discharge Date: 5/30/25     PCP Appointment Date:  No appts scheduled. Tasked to office  Specialist Appointment Date:   Cardiology-  TBD  Nephrology-  1/5/2026      Hospital Encounter and Summary Linked: Yes  ED to Hosp-Admission (Discharged) with Jyotsna Chester MD; Sherrie Jaime DO (05/27/2025)                         Unable to reach patient; Two attempts were made to reach patient within two business days after discharge.  No return call as of this note.  Needs seen by: 6/12/25.

## 2025-06-04 ENCOUNTER — TELEPHONE (OUTPATIENT)
Dept: PRIMARY CARE | Facility: CLINIC | Age: 85
End: 2025-06-04
Payer: COMMERCIAL

## 2025-06-04 DIAGNOSIS — I50.9 CONGESTIVE HEART FAILURE, UNSPECIFIED HF CHRONICITY, UNSPECIFIED HEART FAILURE TYPE: Primary | ICD-10-CM

## 2025-06-04 DIAGNOSIS — I07.1 SEVERE TRICUSPID REGURGITATION: ICD-10-CM

## 2025-06-04 NOTE — TELEPHONE ENCOUNTER
I spoke to DR Cecy Chase -   She is a very close family friend -   And director of palliative care with [facility]     Family contacted her today -   Nadine is declining - much weaker -   Nauseous -   Does not want to go back to the hospital - wants to start hospice care -   Family asked Cecy to call -     I agreed to place order    Review of chart for myself -   Discharge summary -   Recent hospitalization for severe CHF   Severe TR and mod  - severe pulm HTN -       Appropriate for hospice

## 2025-06-04 NOTE — SIGNIFICANT EVENT
Follow Up Phone Call    Outgoing phone call    Spoke to: Nadine CARMELITA Qureshi Relationship: daughter   Phone number: [phone]      Outcome: contacted patient/ family   Chief Complaint   Patient presents with    Shortness of Breath    Leg Swelling     Pt states she has a history of CHF, last night she had an increase in shortness of breath and was unable to sleep due to not being able to lay flat. Pt states she has had a 4 pound weight gain over approx 1 week. She endorses leg swelling as well           Diagnosis:Not applicable    States she is still short of breath. She is performing daily weights and has only gained 1 pound since discharge. Daughter asked if hospice is an option. She will discuss at next appointment. She was unable to schedule a nephrology appointment until January. Will discuss with her doctor. No further questions or concerns.

## 2025-06-05 DIAGNOSIS — I12.9 STAGE 3 CHRONIC KIDNEY DISEASE DUE TO BENIGN HYPERTENSION (MULTI): ICD-10-CM

## 2025-06-05 DIAGNOSIS — N18.30 STAGE 3 CHRONIC KIDNEY DISEASE DUE TO BENIGN HYPERTENSION (MULTI): ICD-10-CM

## 2025-06-05 LAB
ATRIAL RATE: 66 BPM
Q ONSET: 195 MS
QRS COUNT: 10 BEATS
QRS DURATION: 162 MS
QT INTERVAL: 452 MS
QTC CALCULATION(BAZETT): 452 MS
QTC FREDERICIA: 452 MS
R AXIS: -59 DEGREES
T AXIS: 118 DEGREES
T OFFSET: 421 MS
VENTRICULAR RATE: 60 BPM

## 2025-09-01 LAB
ATRIAL RATE: 234 BPM
ATRIAL RATE: 63 BPM
Q ONSET: 196 MS
Q ONSET: 196 MS
QRS COUNT: 10 BEATS
QRS COUNT: 10 BEATS
QRS DURATION: 156 MS
QRS DURATION: 160 MS
QT INTERVAL: 438 MS
QT INTERVAL: 442 MS
QTC CALCULATION(BAZETT): 438 MS
QTC CALCULATION(BAZETT): 442 MS
QTC FREDERICIA: 438 MS
QTC FREDERICIA: 442 MS
R AXIS: -56 DEGREES
R AXIS: -63 DEGREES
T AXIS: 116 DEGREES
T AXIS: 124 DEGREES
T OFFSET: 415 MS
T OFFSET: 417 MS
VENTRICULAR RATE: 60 BPM
VENTRICULAR RATE: 60 BPM

## 2026-01-05 ENCOUNTER — APPOINTMENT (OUTPATIENT)
Dept: NEPHROLOGY | Facility: CLINIC | Age: 86
End: 2026-01-05
Payer: COMMERCIAL

## (undated) DEVICE — CLOSURE DEVICE, VASCULAR, MYNX, 5FR

## (undated) DEVICE — GUIDEWIRE, RUN THROUGH WIRE, 180CM

## (undated) DEVICE — CATHETER, ANGIO, IMPULSE, FL4, 5 FR X 100 CM

## (undated) DEVICE — ACCESS SYSTEM, PINNACLE PRECISION, 5FR X 10CM, ECHOGENIC NEEDLE

## (undated) DEVICE — INTRODUCER SHEATH, GLIDESHEATH, 5FR 10CM

## (undated) DEVICE — NEEDLE, MERIT ADVANCE, ONE WALL,  21GA X 7.0CM, STANDARD SMOOTH

## (undated) DEVICE — CATHETER, ANGIO, IMPULSE, FR4, 6 FR X 100 CM

## (undated) DEVICE — CATHETER, ANGIO, IMPULSE, A MOD, 5 FR X 100 CM

## (undated) DEVICE — 5FR DXTERITY 3DRC DIAGNOSTIC CATHETER, .038 100 CM RADIAL

## (undated) DEVICE — ANGIO KIT, LEFT HEART, LF, CUSTOM

## (undated) DEVICE — CATHETER, WEDGE PRESSURE, BALLOON, DOUBLE LUMEN, 5 FR, 110 CM

## (undated) DEVICE — CATHETER, ANGIO, IMPULSE, FR4, 5 FR X 100 CM

## (undated) DEVICE — MANIFOLD KIT, CUSTOM, GEAUGA

## (undated) DEVICE — PAD, HEMOSTASIS, NEPTUNE, 2 X 2